# Patient Record
Sex: MALE | Race: WHITE | NOT HISPANIC OR LATINO | Employment: FULL TIME | URBAN - METROPOLITAN AREA
[De-identification: names, ages, dates, MRNs, and addresses within clinical notes are randomized per-mention and may not be internally consistent; named-entity substitution may affect disease eponyms.]

---

## 2019-12-19 ENCOUNTER — OFFICE VISIT (OUTPATIENT)
Dept: PRIMARY CARE CLINIC | Facility: CLINIC | Age: 44
End: 2019-12-19
Payer: COMMERCIAL

## 2019-12-19 VITALS
HEART RATE: 73 BPM | DIASTOLIC BLOOD PRESSURE: 80 MMHG | SYSTOLIC BLOOD PRESSURE: 130 MMHG | RESPIRATION RATE: 18 BRPM | OXYGEN SATURATION: 99 % | HEIGHT: 71 IN | BODY MASS INDEX: 27.16 KG/M2 | WEIGHT: 194 LBS

## 2019-12-19 DIAGNOSIS — E53.8 VITAMIN B12 DEFICIENCY: ICD-10-CM

## 2019-12-19 DIAGNOSIS — E55.9 VITAMIN D DEFICIENCY: ICD-10-CM

## 2019-12-19 DIAGNOSIS — Z21 ASYMPTOMATIC HIV INFECTION: ICD-10-CM

## 2019-12-19 DIAGNOSIS — Z13.29 THYROID DISORDER SCREEN: ICD-10-CM

## 2019-12-19 DIAGNOSIS — Z00.00 WELLNESS EXAMINATION: Primary | ICD-10-CM

## 2019-12-19 DIAGNOSIS — E78.00 PURE HYPERCHOLESTEROLEMIA: ICD-10-CM

## 2019-12-19 PROCEDURE — 99386 PR PREVENTIVE VISIT,NEW,40-64: ICD-10-PCS | Mod: S$GLB,,, | Performed by: NURSE PRACTITIONER

## 2019-12-19 PROCEDURE — 99386 PREV VISIT NEW AGE 40-64: CPT | Mod: S$GLB,,, | Performed by: NURSE PRACTITIONER

## 2019-12-19 RX ORDER — PRAVASTATIN SODIUM 10 MG/1
10 TABLET ORAL DAILY
COMMUNITY
End: 2019-12-30 | Stop reason: SDUPTHER

## 2019-12-19 RX ORDER — AMOXICILLIN 875 MG/1
875 TABLET, FILM COATED ORAL
COMMUNITY
End: 2019-12-30

## 2019-12-19 RX ORDER — ABACAVIR 300 MG/1
600 TABLET ORAL DAILY
COMMUNITY
End: 2019-12-30 | Stop reason: SDUPTHER

## 2019-12-19 NOTE — PROGRESS NOTES
Subjective:       Patient ID: Carl Lorenzo is a 43 y.o. male.    Chief Complaint: new patient (establish care)    HPI:    Presents to est care with new PCP.     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     HLD - controlled with Pravastatin, no s/e, needs labs rechecked.     GERD - has taken Zantac for 3 years now, denies s/e, no issues with this.     HIV - dx 15 years ago, started med Ziagen 2 years ago, denies s/e. Needs to est care with HIV provider for med RF and labs.     C/o URI, went to UC twice over the past week, given abx and prednisone, doing better. Had s/e from prednisone. Reports deviated septum/hole in septum was found and referred to ENT Dr. Gunderson's office. Will see him soon.           Patient  has a past medical history of GERD (gastroesophageal reflux disease), HIV (human immunodeficiency virus infection), and Hypertension.    Patient  has no past surgical history on file.    Patient family history includes Diabetes in his father and mother; Thyroid disease in his mother.    Patient  reports that he has never smoked. He has never used smokeless tobacco. He reports that he drinks alcohol.    Review of Systems   Constitutional: Negative for activity change, chills, diaphoresis, fatigue and fever.   HENT: Negative for ear pain, mouth sores, nosebleeds and trouble swallowing.    Eyes: Negative for pain and visual disturbance.   Respiratory: Negative for cough, chest tightness and shortness of breath.    Cardiovascular: Negative for chest pain, palpitations and leg swelling.   Gastrointestinal: Negative for abdominal distention, abdominal pain and blood in stool.   Endocrine: Negative for polydipsia, polyphagia and polyuria.   Genitourinary: Negative for flank pain and frequency.   Musculoskeletal: Negative for gait problem, joint swelling, neck pain and neck stiffness.   Skin: Negative for color change and pallor.   Neurological: Negative  for dizziness, syncope and light-headedness.   Hematological: Negative for adenopathy. Does not bruise/bleed easily.   Psychiatric/Behavioral: Negative for confusion and suicidal ideas.     Objective:         Physical Exam   Constitutional: He is oriented to person, place, and time. He appears well-developed and well-nourished. No distress.   HENT:   Head: Normocephalic and atraumatic.   Nose: Nasal deformity and septal deviation present.   Mouth/Throat: Mucous membranes are normal. Mucous membranes are not pale, not dry and not cyanotic. Oropharyngeal exudate: cobblestone appearance.   Eyes: Pupils are equal, round, and reactive to light. Conjunctivae and EOM are normal.   Neck: Normal range of motion. Neck supple. No JVD present. No tracheal deviation present. No thyromegaly present.   Cardiovascular: Normal rate, regular rhythm, normal heart sounds and intact distal pulses.   Pulmonary/Chest: Effort normal and breath sounds normal. No stridor. No respiratory distress. He has no wheezes. He has no rales.   Abdominal: Soft. Bowel sounds are normal. He exhibits no distension. There is no tenderness. There is no guarding.   Musculoskeletal: Normal range of motion. He exhibits no edema.   Lymphadenopathy:     He has no cervical adenopathy.   Neurological: He is alert and oriented to person, place, and time.   Skin: Skin is warm and dry. Capillary refill takes less than 2 seconds. He is not diaphoretic.   Psychiatric: He has a normal mood and affect. His behavior is normal. Judgment and thought content normal.   Nursing note and vitals reviewed.        Assessment:      1. Wellness examination    2. Pure hypercholesterolemia    3. Vitamin D deficiency    4. Vitamin B12 deficiency    5. Thyroid disorder screen    6. Asymptomatic HIV infection    7. BMI 27.0-27.9,adult          Plan:     Wellness examination  -     CBC auto differential; Future; Expected date: 12/19/2019  -     Comprehensive metabolic panel; Future;  Expected date: 12/19/2019  -     TSH; Future; Expected date: 12/19/2019  -     Urinalysis, Reflex to Urine Culture Urine, Clean Catch; Future    Pure hypercholesterolemia  -     Lipid panel; Future; Expected date: 12/19/2019    Vitamin D deficiency  -     Vitamin D; Future; Expected date: 12/19/2019    Vitamin B12 deficiency  -     Vitamin B12/folate, serum panel; Future; Expected date: 12/19/2019    Thyroid disorder screen  -     T4, free; Future; Expected date: 12/19/2019  -     TSH; Future; Expected date: 12/19/2019    Asymptomatic HIV infection  -     Ambulatory Referral to Infectious Disease    BMI 27.0-27.9,adult  Comments:  diet and exercise    Will check labs and further develop POC based on results.    Cont meds as directed.     RTC in 6 months for check up or sooner if needed. (Unless he decides to stay with [facility] in [place] - it is closer to where he works).      Current Outpatient Medications:     abacavir (ZIAGEN) 300 mg Tab, Take 600 mg by mouth once daily., Disp: , Rfl:     amoxicillin (AMOXIL) 875 MG tablet, Take 875 mg by mouth every 12 (twelve) hours., Disp: , Rfl:     pravastatin (PRAVACHOL) 10 MG tablet, Take 10 mg by mouth once daily., Disp: , Rfl:     ranitidine (ZANTAC) 300 MG tablet, Take 300 mg by mouth once daily., Disp: , Rfl:     Risks, benefits, and alternatives discussed with patient, Patient verbalized understanding of discussed plan of care. Asked patient if any further questions, answered no.

## 2019-12-20 LAB
25(OH)D3 SERPL-MCNC: 77 NG/ML (ref 30–100)
ALBUMIN SERPL-MCNC: 4.9 G/DL (ref 3.6–5.1)
ALBUMIN/GLOB SERPL: 1.8 (CALC) (ref 1–2.5)
ALP SERPL-CCNC: 53 U/L (ref 40–115)
ALT SERPL-CCNC: 15 U/L (ref 9–46)
APPEARANCE UR: CLEAR
AST SERPL-CCNC: 14 U/L (ref 10–40)
BACTERIA #/AREA URNS HPF: NORMAL /HPF
BACTERIA UR CULT: NORMAL
BASOPHILS # BLD AUTO: 9 CELLS/UL (ref 0–200)
BASOPHILS NFR BLD AUTO: 0.1 %
BILIRUB SERPL-MCNC: 0.6 MG/DL (ref 0.2–1.2)
BILIRUB UR QL STRIP: NEGATIVE
BUN SERPL-MCNC: 25 MG/DL (ref 7–25)
BUN/CREAT SERPL: ABNORMAL (CALC) (ref 6–22)
CALCIUM SERPL-MCNC: 10.2 MG/DL (ref 8.6–10.3)
CHLORIDE SERPL-SCNC: 102 MMOL/L (ref 98–110)
CHOLEST SERPL-MCNC: 190 MG/DL
CHOLEST/HDLC SERPL: 2.8 (CALC)
CO2 SERPL-SCNC: 28 MMOL/L (ref 20–32)
COLOR UR: YELLOW
CREAT SERPL-MCNC: 1.11 MG/DL (ref 0.6–1.35)
EOSINOPHIL # BLD AUTO: 0 CELLS/UL (ref 15–500)
EOSINOPHIL NFR BLD AUTO: 0 %
ERYTHROCYTE [DISTWIDTH] IN BLOOD BY AUTOMATED COUNT: 12.4 % (ref 11–15)
FOLATE SERPL-MCNC: 11.8 NG/ML
GFRSERPLBLD MDRD-ARVRAT: 80 ML/MIN/1.73M2
GLOBULIN SER CALC-MCNC: 2.8 G/DL (CALC) (ref 1.9–3.7)
GLUCOSE SERPL-MCNC: 131 MG/DL (ref 65–99)
GLUCOSE UR QL STRIP: NEGATIVE
HCT VFR BLD AUTO: 43.9 % (ref 38.5–50)
HDLC SERPL-MCNC: 67 MG/DL
HGB BLD-MCNC: 15.2 G/DL (ref 13.2–17.1)
HGB UR QL STRIP: NEGATIVE
HYALINE CASTS #/AREA URNS LPF: NORMAL /LPF
KETONES UR QL STRIP: NEGATIVE
LDLC SERPL CALC-MCNC: 104 MG/DL (CALC)
LEUKOCYTE ESTERASE UR QL STRIP: NEGATIVE
LYMPHOCYTES # BLD AUTO: 1130 CELLS/UL (ref 850–3900)
LYMPHOCYTES NFR BLD AUTO: 12.7 %
MCH RBC QN AUTO: 32.5 PG (ref 27–33)
MCHC RBC AUTO-ENTMCNC: 34.6 G/DL (ref 32–36)
MCV RBC AUTO: 93.8 FL (ref 80–100)
MONOCYTES # BLD AUTO: 178 CELLS/UL (ref 200–950)
MONOCYTES NFR BLD AUTO: 2 %
NEUTROPHILS # BLD AUTO: 7583 CELLS/UL (ref 1500–7800)
NEUTROPHILS NFR BLD AUTO: 85.2 %
NITRITE UR QL STRIP: NEGATIVE
NONHDLC SERPL-MCNC: 123 MG/DL (CALC)
PH UR STRIP: 5.5 [PH] (ref 5–8)
PLATELET # BLD AUTO: 185 THOUSAND/UL (ref 140–400)
PMV BLD REES-ECKER: 11.5 FL (ref 7.5–12.5)
POTASSIUM SERPL-SCNC: 4.1 MMOL/L (ref 3.5–5.3)
PROT SERPL-MCNC: 7.7 G/DL (ref 6.1–8.1)
PROT UR QL STRIP: NEGATIVE
RBC # BLD AUTO: 4.68 MILLION/UL (ref 4.2–5.8)
RBC #/AREA URNS HPF: NORMAL /HPF
SODIUM SERPL-SCNC: 139 MMOL/L (ref 135–146)
SP GR UR STRIP: 1.01 (ref 1–1.03)
SQUAMOUS #/AREA URNS HPF: NORMAL /HPF
T4 FREE SERPL-MCNC: 1.2 NG/DL (ref 0.8–1.8)
TRIGL SERPL-MCNC: 97 MG/DL
TSH SERPL-ACNC: 0.53 MIU/L (ref 0.4–4.5)
VIT B12 SERPL-MCNC: >2000 PG/ML (ref 200–1100)
WBC # BLD AUTO: 8.9 THOUSAND/UL (ref 3.8–10.8)
WBC #/AREA URNS HPF: NORMAL /HPF

## 2019-12-30 ENCOUNTER — OFFICE VISIT (OUTPATIENT)
Dept: FAMILY MEDICINE | Facility: CLINIC | Age: 44
End: 2019-12-30
Payer: COMMERCIAL

## 2019-12-30 VITALS
OXYGEN SATURATION: 99 % | BODY MASS INDEX: 27.3 KG/M2 | HEIGHT: 71 IN | SYSTOLIC BLOOD PRESSURE: 103 MMHG | DIASTOLIC BLOOD PRESSURE: 60 MMHG | WEIGHT: 195 LBS | HEART RATE: 60 BPM | RESPIRATION RATE: 18 BRPM

## 2019-12-30 DIAGNOSIS — K21.9 GASTROESOPHAGEAL REFLUX DISEASE WITHOUT ESOPHAGITIS: ICD-10-CM

## 2019-12-30 DIAGNOSIS — E78.5 HYPERLIPIDEMIA, UNSPECIFIED HYPERLIPIDEMIA TYPE: ICD-10-CM

## 2019-12-30 DIAGNOSIS — B20 HUMAN IMMUNODEFICIENCY VIRUS (HIV) DISEASE: Primary | ICD-10-CM

## 2019-12-30 PROCEDURE — 99214 OFFICE O/P EST MOD 30 MIN: CPT | Mod: S$GLB,,, | Performed by: INTERNAL MEDICINE

## 2019-12-30 PROCEDURE — 99214 PR OFFICE/OUTPT VISIT, EST, LEVL IV, 30-39 MIN: ICD-10-PCS | Mod: S$GLB,,, | Performed by: INTERNAL MEDICINE

## 2019-12-30 RX ORDER — VITAMIN B COMPLEX
1 CAPSULE ORAL DAILY
COMMUNITY

## 2019-12-30 RX ORDER — PRAVASTATIN SODIUM 10 MG/1
10 TABLET ORAL DAILY
Qty: 90 TABLET | Refills: 3 | Status: SHIPPED | OUTPATIENT
Start: 2019-12-30 | End: 2020-11-15 | Stop reason: SDUPTHER

## 2019-12-30 NOTE — PROGRESS NOTES
Subjective:       Patient ID: Carl Lorenzo is a 44 y.o. male.    Chief Complaint: Referral (hiv since 2004.)    Patient is here to establish care for HIV.  He reports that he was diagnosed in approximately 2005.  At that time, he was treated for pneumonia and had some other complications.  However he reports that he responded very well to therapy and has been doing well since that time.  He reports that his last lab work was done approximately 8 months ago and his viral load was undetectable.  He is currently on Triumeq and is doing well.  He is not having any adverse effects.  He has not missed any doses in the past month.  He does need a refill on this and would like to have his labs checked.  He also has a history of hyperlipidemia for which he takes pravastatin and this has reportedly been well controlled.  He also has a history of GERD and is taking ranitidine for this.  His symptoms of this are controlled as well.  He has no acute complaints at this time.    Review of Systems   Constitutional: Negative for chills, fatigue and fever.   HENT: Positive for rhinorrhea, sinus pressure and sinus pain. Negative for congestion, ear pain and sore throat.    Respiratory: Negative for cough and shortness of breath.    Cardiovascular: Negative for chest pain and palpitations.   Gastrointestinal: Negative for abdominal pain, diarrhea, nausea and vomiting.   Endocrine: Negative for cold intolerance and heat intolerance.   Genitourinary: Negative for dysuria and hematuria.   Musculoskeletal: Negative for back pain, joint swelling and myalgias.   Skin: Negative for rash and wound.   Neurological: Negative for weakness, numbness and headaches.   Psychiatric/Behavioral: Negative for confusion and sleep disturbance.       Objective:      Physical Exam   Constitutional: He is oriented to person, place, and time. He appears well-developed and well-nourished.   HENT:   Head: Normocephalic and atraumatic.   Mouth/Throat: Oropharynx  is clear and moist.   Eyes: Pupils are equal, round, and reactive to light. No scleral icterus.   Neck: Neck supple. No JVD present. No tracheal deviation present.   Cardiovascular: Normal rate, regular rhythm and normal heart sounds.   No murmur heard.  Pulmonary/Chest: Effort normal and breath sounds normal. No respiratory distress. He has no wheezes.   Abdominal: Soft. Bowel sounds are normal. He exhibits no distension. There is no tenderness. There is no guarding.   Musculoskeletal: Normal range of motion. He exhibits no edema or tenderness.   Neurological: He is alert and oriented to person, place, and time.   Skin: Skin is warm and dry. No rash noted. No erythema.   Psychiatric: He has a normal mood and affect. His behavior is normal.       Assessment:       1. Human immunodeficiency virus (HIV) disease    2. Gastroesophageal reflux disease without esophagitis    3. Hyperlipidemia, unspecified hyperlipidemia type        Plan:       Problem List Items Addressed This Visit        Cardiac/Vascular    Hyperlipidemia     Controlled, will check lipids.  Continue current medications.            ID    Human immunodeficiency virus (HIV) disease - Primary     Controlled per patient, will get updated labs, continue current medications.         Relevant Medications    abacavir-dolutegravir-lamivud (TRIUMEQ) 600- mg Tab    Other Relevant Orders    CBC auto differential    Comprehensive metabolic panel    HIV RNA, quantitative, PCR    CD4 T-[place] Cells    Lipid panel    RPR       GI    Gastroesophageal reflux disease without esophagitis     Controlled, continue current medications.

## 2019-12-30 NOTE — LETTER
December 30, 2019      Meghan Courtney, FNP-C  [address]  [facility]  [facility] [address]           [place] [facility]/Infectious Disease  [address] [facility] [address]  Phone: [phone]  Fax: [phone]          Patient: Carl Lorenzo   MR Number: [id number]   YOB: 1975   Date of Visit: 12/30/2019       Dear Meghan Courtney:    Thank you for referring Carl Lorenzo to me for evaluation. Attached you will find relevant portions of my assessment and plan of care.    If you have questions, please do not hesitate to call me. I look forward to following Carl Lorenzo along with you.    Sincerely,    Artie Billy MD    Enclosure  CC:  No Recipients    If you would like to receive this communication electronically, please contact [email or web address] or [phone] to request more information on [employer] Link access.    For providers and/or their staff who would like to refer a patient to [facility], please contact us through our one-stop-shop provider referral line, [facility], at [phone].    If you feel you have received this communication in error or would no longer like to receive these types of communications, please e-mail [email or web address]

## 2020-01-10 ENCOUNTER — TELEPHONE (OUTPATIENT)
Dept: PRIMARY CARE CLINIC | Facility: CLINIC | Age: 45
End: 2020-01-10

## 2020-01-14 ENCOUNTER — PATIENT MESSAGE (OUTPATIENT)
Dept: FAMILY MEDICINE | Facility: CLINIC | Age: 45
End: 2020-01-14

## 2020-01-29 ENCOUNTER — PATIENT MESSAGE (OUTPATIENT)
Dept: FAMILY MEDICINE | Facility: CLINIC | Age: 45
End: 2020-01-29

## 2020-01-30 RX ORDER — OMEPRAZOLE 40 MG/1
40 CAPSULE, DELAYED RELEASE ORAL DAILY
Qty: 30 CAPSULE | Refills: 11 | Status: SHIPPED | OUTPATIENT
Start: 2020-01-30 | End: 2020-11-15 | Stop reason: SDUPTHER

## 2020-02-24 PROBLEM — J32.2 CHRONIC ETHMOIDAL SINUSITIS: Status: ACTIVE | Noted: 2020-02-24

## 2020-02-24 PROBLEM — J32.3 CHRONIC SPHENOIDAL SINUSITIS: Status: ACTIVE | Noted: 2020-02-24

## 2020-02-24 PROBLEM — J32.1 CHRONIC FRONTAL SINUSITIS: Status: ACTIVE | Noted: 2020-02-24

## 2020-02-24 PROBLEM — J34.3 NASAL TURBINATE HYPERTROPHY: Status: ACTIVE | Noted: 2020-02-24

## 2020-02-24 PROBLEM — J34.2 DEVIATED NASAL SEPTUM: Status: ACTIVE | Noted: 2020-02-24

## 2020-02-24 PROBLEM — J32.0 CHRONIC MAXILLARY SINUSITIS: Status: ACTIVE | Noted: 2020-02-24

## 2020-02-24 PROBLEM — J34.89 NASAL SEPTAL PERFORATION: Status: ACTIVE | Noted: 2020-02-24

## 2020-02-24 PROBLEM — J34.89 NASAL OBSTRUCTION: Status: ACTIVE | Noted: 2020-02-24

## 2020-02-25 ENCOUNTER — PATIENT MESSAGE (OUTPATIENT)
Dept: PRIMARY CARE CLINIC | Facility: CLINIC | Age: 45
End: 2020-02-25

## 2020-03-10 ENCOUNTER — PATIENT MESSAGE (OUTPATIENT)
Dept: FAMILY MEDICINE | Facility: CLINIC | Age: 45
End: 2020-03-10

## 2020-03-10 ENCOUNTER — OFFICE VISIT (OUTPATIENT)
Dept: FAMILY MEDICINE | Facility: CLINIC | Age: 45
End: 2020-03-10
Payer: COMMERCIAL

## 2020-03-10 VITALS
DIASTOLIC BLOOD PRESSURE: 80 MMHG | RESPIRATION RATE: 16 BRPM | OXYGEN SATURATION: 98 % | SYSTOLIC BLOOD PRESSURE: 126 MMHG | HEIGHT: 71 IN | BODY MASS INDEX: 27.86 KG/M2 | WEIGHT: 199 LBS | TEMPERATURE: 98 F | HEART RATE: 81 BPM

## 2020-03-10 DIAGNOSIS — H10.9 CONJUNCTIVITIS OF RIGHT EYE, UNSPECIFIED CONJUNCTIVITIS TYPE: Primary | ICD-10-CM

## 2020-03-10 PROCEDURE — 99212 OFFICE O/P EST SF 10 MIN: CPT | Mod: S$GLB,,, | Performed by: NURSE PRACTITIONER

## 2020-03-10 PROCEDURE — 99212 PR OFFICE/OUTPT VISIT, EST, LEVL II, 10-19 MIN: ICD-10-PCS | Mod: S$GLB,,, | Performed by: NURSE PRACTITIONER

## 2020-03-10 RX ORDER — POLYMYXIN B SULFATE AND TRIMETHOPRIM 1; 10000 MG/ML; [USP'U]/ML
1 SOLUTION OPHTHALMIC EVERY 6 HOURS
Qty: 10 ML | Refills: 0 | Status: SHIPPED | OUTPATIENT
Start: 2020-03-10 | End: 2020-03-17 | Stop reason: SDUPTHER

## 2020-03-10 NOTE — PROGRESS NOTES
Infectious Diseases Clinic Note      Subjective:      Patient ID: Carl Lorenzo is a 44 y.o. male.    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     HLD - controlled with Pravastatin, no s/e, needs labs rechecked.     GERD - has taken Zantac for 3 years now, denies s/e, no issues with this.     HIV - dx 15 years ago, started med Ziagen 2 years ago, denies s/e.     His primary care provider is COREY Courtney NP     This patient is a new patient to me.  He is however managed here by Dr. Billy regarding his HIV.       Here today with complaints of pink eye.  He states that he repeatedly has this issue in the past for which he was treated with eyedrops.  His symptoms began yesterday in his left eye.  He began applying the eye drops to his left eye and prophylactically apply them to his right eye.  Woke up today with crusting of the right eye.  He reports rib resolution in left time.  Denies visual change.  No eye pain associated.  His tetanus is up-to-date.  He is unsure with the eyedrops were called.    No other issues at this time.          Past Medical History:   Diagnosis Date    GERD (gastroesophageal reflux disease)     HIV (human immunodeficiency virus infection) 01/01/2004    Hypertension       Social History     Socioeconomic History    Marital status:      Spouse name: Not on file    Number of children: Not on file    Years of education: Not on file    Highest education level: Not on file   Occupational History    Not on file   Social Needs    Financial resource strain: Not on file    Food insecurity:     Worry: Not on file     Inability: Not on file    Transportation needs:     Medical: Not on file     Non-medical: Not on file   Tobacco Use    Smoking status: Never Smoker    Smokeless tobacco: Never Used   Substance and Sexual Activity     Alcohol use: Yes    Drug use: Not on file    Sexual activity: Not on file   Lifestyle    Physical activity:     Days per week: Not on file     Minutes per session: Not on file    Stress: Not on file   Relationships    Social connections:     Talks on phone: Not on file     Gets together: Not on file     Attends [employer] service: Not on file     Active member of club or organization: Not on file     Attends meetings of clubs or organizations: Not on file     Relationship status: Not on file   Other Topics Concern    Not on file   Social History Narrative    Not on file      Family History   Problem Relation Age of Onset    Thyroid disease Mother     Diabetes Mother     Diabetes Father         ROS:   Review of Systems   Constitutional: Negative for activity change, chills, diaphoresis, fatigue and fever.   HENT: Negative for congestion, ear pain, mouth sores, nosebleeds and trouble swallowing.    Eyes: Positive for redness and itching. Negative for photophobia, pain, discharge and visual disturbance.   Respiratory: Negative for cough, chest tightness and shortness of breath.    Cardiovascular: Negative for chest pain, palpitations and leg swelling.   Gastrointestinal: Negative for abdominal distention, abdominal pain and blood in stool.   Endocrine: Negative for polydipsia, polyphagia and polyuria.   Genitourinary: Negative for flank pain and frequency.   Musculoskeletal: Negative for gait problem, joint swelling, neck pain and neck stiffness.   Skin: Negative for color change and pallor.   Neurological: Negative for dizziness, syncope and light-headedness.   Hematological: Negative for adenopathy. Does not bruise/bleed easily.   Psychiatric/Behavioral: Negative for confusion and suicidal ideas.   All other systems reviewed and are negative.    Objective:   Physical Exam   Constitutional: He appears well-developed and well-nourished. No distress.   HENT:   Head: Normocephalic.   Nose: Nose normal.    Mouth/Throat: Oropharynx is clear and moist.   Eyes: Pupils are equal, round, and reactive to light. EOM are normal. Lids are everted and swept, no foreign bodies found. Right eye exhibits exudate. No foreign body present in the right eye. No foreign body present in the left eye. Right conjunctiva is injected. Left conjunctiva is not injected. No scleral icterus.   Cardiovascular: Normal rate.   Pulmonary/Chest: Effort normal.   Nursing note and vitals reviewed.    Assessment:     1. Conjunctivitis of right eye, unspecified conjunctivitis type      No images are attached to the encounter.   Plan:     Problem List Items Addressed This Visit     None      Visit Diagnoses     Conjunctivitis of right eye, unspecified conjunctivitis type    -  Primary    Rx Polytrim.  Discontinue prior eyedrops.  Return to clinic as needed.  Tetanus up-to-date    Relevant Medications    polymyxin B sulf-trimethoprim (POLYTRIM) 10,000 unit- 1 mg/mL Drop        Procedures     Follow up:     There are no Patient Instructions on file for this visit.     Follow up if symptoms worsen or fail to improve.

## 2020-03-17 DIAGNOSIS — H10.9 CONJUNCTIVITIS OF RIGHT EYE, UNSPECIFIED CONJUNCTIVITIS TYPE: ICD-10-CM

## 2020-03-17 RX ORDER — POLYMYXIN B SULFATE AND TRIMETHOPRIM 1; 10000 MG/ML; [USP'U]/ML
1 SOLUTION OPHTHALMIC EVERY 6 HOURS
Qty: 10 ML | Refills: 0 | Status: SHIPPED | OUTPATIENT
Start: 2020-03-17 | End: 2020-03-22

## 2020-08-24 ENCOUNTER — OFFICE VISIT (OUTPATIENT)
Dept: FAMILY MEDICINE | Facility: CLINIC | Age: 45
End: 2020-08-24
Payer: COMMERCIAL

## 2020-08-24 VITALS
HEART RATE: 94 BPM | DIASTOLIC BLOOD PRESSURE: 80 MMHG | HEIGHT: 71 IN | SYSTOLIC BLOOD PRESSURE: 119 MMHG | RESPIRATION RATE: 16 BRPM | BODY MASS INDEX: 26.88 KG/M2 | OXYGEN SATURATION: 98 % | TEMPERATURE: 98 F | WEIGHT: 192 LBS

## 2020-08-24 DIAGNOSIS — G47.00 INSOMNIA, UNSPECIFIED TYPE: ICD-10-CM

## 2020-08-24 DIAGNOSIS — B20 HUMAN IMMUNODEFICIENCY VIRUS (HIV) DISEASE: Primary | ICD-10-CM

## 2020-08-24 PROCEDURE — 99213 OFFICE O/P EST LOW 20 MIN: CPT | Mod: S$GLB,,, | Performed by: NURSE PRACTITIONER

## 2020-08-24 PROCEDURE — 3008F PR BODY MASS INDEX (BMI) DOCUMENTED: ICD-10-PCS | Mod: CPTII,S$GLB,, | Performed by: NURSE PRACTITIONER

## 2020-08-24 PROCEDURE — 3008F BODY MASS INDEX DOCD: CPT | Mod: CPTII,S$GLB,, | Performed by: NURSE PRACTITIONER

## 2020-08-24 PROCEDURE — 99213 PR OFFICE/OUTPT VISIT, EST, LEVL III, 20-29 MIN: ICD-10-PCS | Mod: S$GLB,,, | Performed by: NURSE PRACTITIONER

## 2020-08-24 RX ORDER — LORAZEPAM 0.5 MG/1
0.5 TABLET ORAL NIGHTLY PRN
Qty: 30 TABLET | Refills: 0 | Status: SHIPPED | OUTPATIENT
Start: 2020-08-24 | End: 2020-10-09 | Stop reason: SDUPTHER

## 2020-08-24 NOTE — ASSESSMENT & PLAN NOTE
Pt will start Lorazepam 0.5mg.  He will f/u in 2 weeks. RTC sooner if SI, HI, depression develop.     At that time, will transition primary care over to my services as he requested.  I explained that today's visit would be infectious disease only and that I would only start the lorazepam w/ f/u in 2 weeks.

## 2020-08-24 NOTE — ASSESSMENT & PLAN NOTE
Currently on Triumeq. 100% compliant. No side effects. Will get labs updated today. He will f/u in clinic in 2 weeks to discuss. Continue meds.     January 2020 labs as follows:    CD4 893  RPR nonreactive  Macrocytic anemia but otherwise unremarkable  CMP WNL  HIV-1 RNA not detected.

## 2020-08-24 NOTE — PROGRESS NOTES
Infectious Diseases Clinic Note    Subjective:       Patient ID: Carl Lorenzo is a 44 y.o. male     Chief Complaint: Follow-up and Anxiety (Since pandemic. Has trouble sleeping at night and anxiety occurs nightly. Pt is requesting Lorazepam rx. Taken in the past and did well.)        Carl Lorenzo is a 44 y.o. male here today for 6 mth f/u regarding HIV.  This is a new diagnosis to me.   Primary care provider is DAVID Alvarez .     He reports that he was diagnosed in approximately 2005. He is currently on Triumeq. Previous ID MD was in [place].  He reports that he responded very well to therapy and has been doing well since that time. He is 100% compliant with meds. He has not missed any doses in the past month.  No side effects reported.  He reports that his last lab work was done approximately 6 months ago and his viral load was undetectable. He is here primarily for lab work checked.  He also has a history of hyperlipidemia for which he takes pravastatin and this has reportedly been well controlled.  He also has a history of GERD and is taking ranitidine for this.  His symptoms of this are controlled as well.     His only issue today is insomnia.  He was taking Ambien in the past and does not wish to continue that medication.  He is also taking lorazepam in the past, which seems to be the most beneficial to him.  He reports increased anxiety and inability to fall asleep due to his job.  Denies depression.  Denies SI, HI, delusions, grandiosity, anhedonia.     His previous PCP was ana KRAMER.  He wishes to transition care over at this point.     No other issues reported at this time.                Past Medical History:   Diagnosis Date    GERD (gastroesophageal reflux disease)     HIV (human immunodeficiency virus infection) 01/01/2004    Hypertension        Social History     Socioeconomic History    Marital status:      Spouse name: Not on file    Number of  children: Not on file    Years of education: Not on file    Highest education level: Not on file   Occupational History    Not on file   Social Needs    Financial resource strain: Not on file    Food insecurity     Worry: Not on file     Inability: Not on file    Transportation needs     Medical: Not on file     Non-medical: Not on file   Tobacco Use    Smoking status: Never Smoker    Smokeless tobacco: Never Used   Substance and Sexual Activity    Alcohol use: Yes    Drug use: Not on file    Sexual activity: Not on file   Lifestyle    Physical activity     Days per week: Not on file     Minutes per session: Not on file    Stress: Not on file   Relationships    Social connections     Talks on phone: Not on file     Gets together: Not on file     Attends [employer] service: Not on file     Active member of club or organization: Not on file     Attends meetings of clubs or organizations: Not on file     Relationship status: Not on file   Other Topics Concern    Not on file   Social History Narrative    Not on file         Current Outpatient Medications:     abacavir-dolutegravir-lamivud (TRIUMEQ) 600- mg Tab, Take 1 tablet by mouth once daily., Disp: 90 tablet, Rfl: 3    ascorbic acid, vitamin C, (VITAMIN C) 100 MG tablet, Take 100 mg by mouth once daily., Disp: , Rfl:     b complex vitamins capsule, Take 1 capsule by mouth once daily., Disp: , Rfl:     LORazepam (ATIVAN) 0.5 MG tablet, Take 1 tablet (0.5 mg total) by mouth nightly as needed (insomnia)., Disp: 30 tablet, Rfl: 0    omeprazole (PRILOSEC) 40 MG capsule, Take 1 capsule (40 mg total) by mouth once daily., Disp: 30 capsule, Rfl: 11    pravastatin (PRAVACHOL) 10 MG tablet, Take 1 tablet (10 mg total) by mouth once daily., Disp: 90 tablet, Rfl: 3    vitamin E 100 UNIT capsule, Take 100 Units by mouth once daily., Disp: , Rfl:     Review of Systems   Constitutional: Negative for activity change, chills, diaphoresis, fatigue and  fever.   HENT: Negative for ear pain, mouth sores, nosebleeds and trouble swallowing.    Eyes: Negative for pain and visual disturbance.   Respiratory: Negative for cough, chest tightness and shortness of breath.    Cardiovascular: Negative for chest pain, palpitations and leg swelling.   Gastrointestinal: Negative for abdominal distention, abdominal pain and blood in stool.   Endocrine: Negative for polydipsia, polyphagia and polyuria.   Genitourinary: Negative for flank pain and frequency.   Musculoskeletal: Negative for gait problem, joint swelling, neck pain and neck stiffness.   Skin: Negative for color change and pallor.   Neurological: Negative for dizziness, syncope and light-headedness.   Hematological: Negative for adenopathy. Does not bruise/bleed easily.   Psychiatric/Behavioral: Positive for sleep disturbance. Negative for confusion, dysphoric mood, hallucinations, self-injury and suicidal ideas. The patient is nervous/anxious. The patient is not hyperactive.            Objective:      Vitals:    08/24/20 1458   BP: 119/80   Pulse: 94   Resp: 16   Temp: 98.2 °F (36.8 °C)     Physical Exam  Vitals signs and nursing note reviewed.   Constitutional:       General: He is not in acute distress.     Appearance: He is well-developed and normal weight. He is not ill-appearing.   HENT:      Head: Normocephalic and atraumatic.   Eyes:      General: No scleral icterus.     Pupils: Pupils are equal, round, and reactive to light.   Neck:      Musculoskeletal: Neck supple.      Vascular: No JVD.      Trachea: No tracheal deviation.   Cardiovascular:      Rate and Rhythm: Normal rate and regular rhythm.      Heart sounds: Normal heart sounds. No murmur.   Pulmonary:      Effort: Pulmonary effort is normal. No respiratory distress.   Abdominal:      General: Bowel sounds are normal.      Palpations: Abdomen is soft.   Musculoskeletal: Normal range of motion.         General: No tenderness.   Skin:     General: Skin is  warm and dry.      Findings: No erythema or rash.   Neurological:      Mental Status: He is alert and oriented to person, place, and time. Mental status is at baseline.   Psychiatric:         Attention and Perception: Attention and perception normal.         Mood and Affect: Mood and affect normal.         Speech: Speech normal.         Behavior: Behavior normal. Behavior is cooperative.         Thought Content: Thought content normal.         Cognition and Memory: Cognition and memory normal.         Judgment: Judgment normal.             Assessment/Plan:       1. Human immunodeficiency virus (HIV) disease    2. Insomnia, unspecified type      Problem List Items Addressed This Visit        ID    Human immunodeficiency virus (HIV) disease - Primary    Current Assessment & Plan     Currently on Triumeq. 100% compliant. No side effects. Will get labs updated today. He will f/u in clinic in 2 weeks to discuss. Continue meds.     January 2020 labs as follows:    CD4 893  RPR nonreactive  Macrocytic anemia but otherwise unremarkable  CMP WNL  HIV-1 RNA not detected.          Relevant Orders    CBC auto differential    Comprehensive metabolic panel    Lipid Panel    HIV RNA, quantitative, PCR    Treponema pallidum (syphillis) antibody    CD4 T-[place] Cells       Other    Insomnia    Current Assessment & Plan     Pt will start Lorazepam 0.5mg.  He will f/u in 2 weeks. RTC sooner if SI, HI, depression develop.     At that time, will transition primary care over to my services as he requested.  I explained that today's visit would be infectious disease only and that I would only start the lorazepam w/ f/u in 2 weeks.          Relevant Medications    LORazepam (ATIVAN) 0.5 MG tablet           All diagnostic data (labs/imaging) was reviewed with the patient and/or family member in the room.  All questions were answered to their liking. The patient and/or family member voiced understanding of all instructions provided.  Expectations regarding follow up and treatment plan were voiced and confirmed prior to departure. The patient was given orders/instructions at the end of the visit for reference.     Follow up:     There are no Patient Instructions on file for this visit.     No follow-ups on file.

## 2020-09-18 LAB
ABS NRBC COUNT: 0 X 10 3/UL (ref 0–0.01)
ABSOLUTE BASOPHIL: 0.03 X 10 3/UL (ref 0–0.22)
ABSOLUTE EOSINOPHIL: 0.05 X 10 3/UL (ref 0.04–0.54)
ABSOLUTE IMMATURE GRAN: 0.02 X 10 3/UL (ref 0–0.04)
ABSOLUTE LYMPHOCYTE: 2.4 X 10 3/UL (ref 0.86–4.75)
ABSOLUTE MONOCYTE: 0.42 X 10 3/UL (ref 0.22–1.08)
ALBUMIN SERPL-MCNC: 4.6 G/DL (ref 3.5–5.2)
ALBUMIN/GLOB SERPL ELPH: 1.9 {RATIO} (ref 1–2.7)
ALP ISOS SERPL LEV INH-CCNC: 69 U/L (ref 40–130)
ALT (SGPT): 29 U/L (ref 0–41)
ANION GAP SERPL CALC-SCNC: 9 MMOL/L (ref 8–17)
AST SERPL-CCNC: 20 U/L (ref 0–40)
BASOPHILS NFR BLD: 0.5 % (ref 0.2–1.2)
BILIRUBIN, TOTAL: 0.27 MG/DL (ref 0–1.2)
BUN/CREAT SERPL: 22.8 (ref 6–20)
CALCIUM SERPL-MCNC: 9.5 MG/DL (ref 8.6–10.2)
CARBON DIOXIDE, CO2: 28 MMOL/L (ref 22–29)
CHLORIDE: 105 MMOL/L (ref 98–107)
CHOLEST SERPL-MSCNC: 201 MG/DL (ref 100–200)
CREAT SERPL-MCNC: 1.3 MG/DL (ref 0.7–1.2)
EOSINOPHIL NFR BLD: 0.8 % (ref 0.7–7)
GFR ESTIMATION: 59.43
GLOBULIN: 2.4 G/DL (ref 1.5–4.5)
GLUCOSE: 101 MG/DL (ref 74–106)
HCT VFR BLD AUTO: 41.6 % (ref 42–52)
HDLC SERPL-MCNC: 55 MG/DL
HGB BLD-MCNC: 14 G/DL (ref 14–18)
IMMATURE GRANULOCYTES: 0.3 % (ref 0–0.5)
LDL/HDL RATIO: 2.1 (ref 1–3)
LDLC SERPL CALC-MCNC: 118 MG/DL (ref 0–100)
LYMPHOCYTES NFR BLD: 36.4 % (ref 19.3–53.1)
Lab: NORMAL
MCH RBC QN AUTO: 32.3 PG (ref 27–32)
MCHC RBC AUTO-ENTMCNC: 33.7 G/DL (ref 32–36)
MCV RBC AUTO: 95.9 FL (ref 80–94)
MONOCYTES NFR BLD: 6.4 % (ref 4.7–12.5)
NEUTROPHILS # BLD AUTO: 3.67 X 10 3/UL (ref 2.15–7.56)
NEUTROPHILS NFR BLD: 55.6 % (ref 34–71.1)
NUCLEATED RED BLOOD CELLS: 0 /100 WBC (ref 0–0.2)
PLATELET # BLD AUTO: 159 X 10 3/UL (ref 135–400)
POTASSIUM: 4.2 MMOL/L (ref 3.5–5.1)
PROT SNV-MCNC: 7 G/DL (ref 6.4–8.3)
RBC # BLD AUTO: 4.34 X 10 6/UL (ref 4.7–6.1)
RDW-SD: 43.8 FL (ref 37–54)
SODIUM: 142 MMOL/L (ref 136–145)
SYPHILIS TREPONEMAL ANTIBODY: NONREACTIVE
TRIGL SERPL-MCNC: 140 MG/DL (ref 0–150)
UREA NITROGEN (BUN): 29.6 MG/DL (ref 6–20)
WBC # BLD: 6.59 X 10 3/UL (ref 4.3–10.8)

## 2020-10-19 ENCOUNTER — OFFICE VISIT (OUTPATIENT)
Dept: FAMILY MEDICINE | Facility: CLINIC | Age: 45
End: 2020-10-19
Payer: COMMERCIAL

## 2020-10-19 VITALS
SYSTOLIC BLOOD PRESSURE: 123 MMHG | BODY MASS INDEX: 27.09 KG/M2 | HEART RATE: 69 BPM | HEIGHT: 71 IN | OXYGEN SATURATION: 98 % | WEIGHT: 193.5 LBS | DIASTOLIC BLOOD PRESSURE: 77 MMHG

## 2020-10-19 DIAGNOSIS — B20 HUMAN IMMUNODEFICIENCY VIRUS (HIV) DISEASE: ICD-10-CM

## 2020-10-19 PROCEDURE — 99213 PR OFFICE/OUTPT VISIT, EST, LEVL III, 20-29 MIN: ICD-10-PCS | Mod: S$GLB,,, | Performed by: NURSE PRACTITIONER

## 2020-10-19 PROCEDURE — 99213 OFFICE O/P EST LOW 20 MIN: CPT | Mod: S$GLB,,, | Performed by: NURSE PRACTITIONER

## 2020-10-19 PROCEDURE — 3008F BODY MASS INDEX DOCD: CPT | Mod: CPTII,S$GLB,, | Performed by: NURSE PRACTITIONER

## 2020-10-19 PROCEDURE — 3008F PR BODY MASS INDEX (BMI) DOCUMENTED: ICD-10-PCS | Mod: CPTII,S$GLB,, | Performed by: NURSE PRACTITIONER

## 2020-10-19 NOTE — ASSESSMENT & PLAN NOTE
HIV 1 RNA undetectable  RPR nonreactive    Patient is 100% compliant with Triumeq.  Doing well on the medication.  No side effects from medication reported.  Labs discussed with patient      Creatinine 1.3  GFR 59  LFTs unremarkable    Cholesterol 201  HDL 55      CBC showed some mild macrocytic anemia (ETOH vs B12) but otherwise unremarkable     Currently on Triumeq. 100% compliant. No side effects. Will get labs updated today. He will f/u in clinic in 2 weeks to discuss. Continue meds.      January 2020 labs as follows:     CD4 893  RPR nonreactive  Macrocytic anemia but otherwise unremarkable  CMP WNL  HIV-1 RNA not detected.

## 2020-10-19 NOTE — PROGRESS NOTES
Subjective:      Patient ID: Carl Lorenzo is a 44 y.o. male.    Chief Complaint: Results (labs)      Carl Lorenzo is a 44 y.o. male here today for 6 mth f/u regarding HIV.       He reports that he was diagnosed in approximately 2005. He is currently on Triumeq. Previous ID MD was in [place].  He reports that he responded very well to therapy and has been doing well since that time. He is 100% compliant with meds. He has not missed any doses in the past month.  No side effects reported.  He had labs done prior to arrival and is here to discuss.  Denies opportunistic infections.  Denies fever chills myalgias lymphadenopathy.       No other issues reported at this time.       Past Medical History:   Diagnosis Date    GERD (gastroesophageal reflux disease)     HIV (human immunodeficiency virus infection) 01/01/2004    Hypertension       Social History     Socioeconomic History    Marital status:      Spouse name: Not on file    Number of children: Not on file    Years of education: Not on file    Highest education level: Not on file   Occupational History    Not on file   Social Needs    Financial resource strain: Not on file    Food insecurity     Worry: Not on file     Inability: Not on file    Transportation needs     Medical: Not on file     Non-medical: Not on file   Tobacco Use    Smoking status: Never Smoker    Smokeless tobacco: Never Used   Substance and Sexual Activity    Alcohol use: Yes    Drug use: Not on file    Sexual activity: Not on file   Lifestyle    Physical activity     Days per week: Not on file     Minutes per session: Not on file    Stress: Not on file   Relationships    Social connections     Talks on phone: Not on file     Gets together: Not on file     Attends [employer] service: Not on file     Active member of club or organization: Not on file     Attends meetings of clubs or organizations: Not on file     Relationship status: Not on file   Other  Topics Concern    Not on file   Social History Narrative    Not on file      Family History   Problem Relation Age of Onset    Thyroid disease Mother     Diabetes Mother     Diabetes Father         ROS:   Review of Systems   Constitutional: Negative for activity change, chills, diaphoresis, fatigue and fever.   HENT: Negative for ear pain, mouth sores, nosebleeds and trouble swallowing.    Eyes: Negative for pain and visual disturbance.   Respiratory: Negative for cough, chest tightness and shortness of breath.    Cardiovascular: Negative for chest pain, palpitations and leg swelling.   Gastrointestinal: Negative for abdominal distention, abdominal pain and blood in stool.   Endocrine: Negative for polydipsia, polyphagia and polyuria.   Genitourinary: Negative for flank pain and frequency.   Musculoskeletal: Negative for gait problem, joint swelling, neck pain and neck stiffness.   Skin: Negative for color change and pallor.   Neurological: Negative for dizziness, syncope and light-headedness.   Hematological: Negative for adenopathy. Does not bruise/bleed easily.   Psychiatric/Behavioral: Negative for confusion, dysphoric mood, hallucinations, self-injury, sleep disturbance and suicidal ideas. The patient is not nervous/anxious and is not hyperactive.      Objective:   Physical Exam  Vitals signs and nursing note reviewed.   Constitutional:       General: He is not in acute distress.     Appearance: Normal appearance. He is well-developed and normal weight. He is not ill-appearing.   HENT:      Head: Normocephalic and atraumatic.   Eyes:      General: No scleral icterus.     Pupils: Pupils are equal, round, and reactive to light.   Neck:      Musculoskeletal: Neck supple.      Vascular: No JVD.      Trachea: No tracheal deviation.   Cardiovascular:      Rate and Rhythm: Normal rate.   Pulmonary:      Effort: Pulmonary effort is normal. No respiratory distress.   Musculoskeletal: Normal range of motion.          General: No tenderness.   Skin:     General: Skin is warm and dry.      Findings: No erythema or rash.   Neurological:      Mental Status: He is alert and oriented to person, place, and time. Mental status is at baseline.   Psychiatric:         Attention and Perception: Attention and perception normal.         Mood and Affect: Mood and affect normal.         Speech: Speech normal.         Behavior: Behavior normal. Behavior is cooperative.         Thought Content: Thought content normal.         Cognition and Memory: Cognition and memory normal.         Judgment: Judgment normal.       Assessment:     1. Human immunodeficiency virus (HIV) disease      No images are attached to the encounter.   Plan:     Problem List Items Addressed This Visit        ID    Human immunodeficiency virus (HIV) disease    Current Assessment & Plan     HIV 1 RNA undetectable  RPR nonreactive    Patient is 100% compliant with Triumeq.  Doing well on the medication.  No side effects from medication reported.  Labs discussed with patient      Creatinine 1.3  GFR 59  LFTs unremarkable    Cholesterol 201  HDL 55      CBC showed some mild macrocytic anemia (ETOH vs B12) but otherwise unremarkable     Currently on Triumeq. 100% compliant. No side effects. Will get labs updated today. He will f/u in clinic in 2 weeks to discuss. Continue meds.      January 2020 labs as follows:     CD4 893  RPR nonreactive  Macrocytic anemia but otherwise unremarkable  CMP WNL  HIV-1 RNA not detected.                       All diagnostic data (labs/imaging) was reviewed with the patient and/or family member in the room.  All questions were answered to their liking. The patient and/or family member voiced understanding of all instructions provided. Expectations regarding follow up and treatment plan were voiced and confirmed prior to departure. The patient was given orders/instructions at the end of the visit for reference.     Follow up:     There are no  Patient Instructions on file for this visit.     No follow-ups on file.

## 2021-06-01 RX ORDER — PRAVASTATIN SODIUM 10 MG/1
10 TABLET ORAL DAILY
Qty: 90 TABLET | Refills: 3 | Status: SHIPPED | OUTPATIENT
Start: 2021-06-01 | End: 2021-09-26 | Stop reason: SDUPTHER

## 2021-09-26 DIAGNOSIS — B20 HUMAN IMMUNODEFICIENCY VIRUS (HIV) DISEASE: ICD-10-CM

## 2021-09-26 DIAGNOSIS — G47.00 INSOMNIA, UNSPECIFIED TYPE: ICD-10-CM

## 2021-09-27 RX ORDER — LORAZEPAM 0.5 MG/1
0.5 TABLET ORAL DAILY
Qty: 30 TABLET | Refills: 1 | Status: SHIPPED | OUTPATIENT
Start: 2021-09-27 | End: 2021-12-16

## 2021-09-27 RX ORDER — PRAVASTATIN SODIUM 10 MG/1
10 TABLET ORAL DAILY
Qty: 90 TABLET | Refills: 3 | Status: SHIPPED | OUTPATIENT
Start: 2021-09-27 | End: 2021-11-26 | Stop reason: SDUPTHER

## 2021-09-27 RX ORDER — ABACAVIR SULFATE, DOLUTEGRAVIR SODIUM, LAMIVUDINE 600; 50; 300 MG/1; MG/1; MG/1
1 TABLET, FILM COATED ORAL DAILY
Qty: 90 TABLET | Refills: 3 | Status: SHIPPED | OUTPATIENT
Start: 2021-09-27 | End: 2022-11-03

## 2021-10-27 ENCOUNTER — PATIENT MESSAGE (OUTPATIENT)
Dept: FAMILY MEDICINE | Facility: CLINIC | Age: 46
End: 2021-10-27
Payer: COMMERCIAL

## 2021-10-27 DIAGNOSIS — B20 HUMAN IMMUNODEFICIENCY VIRUS (HIV) DISEASE: Primary | ICD-10-CM

## 2022-02-10 ENCOUNTER — OFFICE VISIT (OUTPATIENT)
Dept: FAMILY MEDICINE | Facility: CLINIC | Age: 47
End: 2022-02-10
Payer: COMMERCIAL

## 2022-02-10 ENCOUNTER — PATIENT MESSAGE (OUTPATIENT)
Dept: FAMILY MEDICINE | Facility: CLINIC | Age: 47
End: 2022-02-10

## 2022-02-10 VITALS
OXYGEN SATURATION: 98 % | WEIGHT: 204 LBS | SYSTOLIC BLOOD PRESSURE: 138 MMHG | RESPIRATION RATE: 18 BRPM | DIASTOLIC BLOOD PRESSURE: 82 MMHG | HEART RATE: 62 BPM | BODY MASS INDEX: 28.45 KG/M2

## 2022-02-10 DIAGNOSIS — A53.0 POSITIVE RPR TEST: ICD-10-CM

## 2022-02-10 DIAGNOSIS — Z12.11 SCREENING FOR COLON CANCER: ICD-10-CM

## 2022-02-10 DIAGNOSIS — B20 HUMAN IMMUNODEFICIENCY VIRUS (HIV) DISEASE: ICD-10-CM

## 2022-02-10 DIAGNOSIS — H00.039: Primary | ICD-10-CM

## 2022-02-10 DIAGNOSIS — Z11.3 SCREENING FOR STD (SEXUALLY TRANSMITTED DISEASE): ICD-10-CM

## 2022-02-10 LAB
ABS NRBC COUNT: 0 X 10 3/UL (ref 0–0.01)
ABSOLUTE BASOPHIL: 0.02 X 10 3/UL (ref 0–0.22)
ABSOLUTE EOSINOPHIL: 0.06 X 10 3/UL (ref 0.04–0.54)
ABSOLUTE IMMATURE GRAN: 0.01 X 10 3/UL (ref 0–0.04)
ABSOLUTE LYMPHOCYTE: 1.77 X 10 3/UL (ref 0.86–4.75)
ABSOLUTE MONOCYTE: 0.36 X 10 3/UL (ref 0.22–1.08)
ADDITIONAL TESTING: NORMAL
ALBUMIN SERPL-MCNC: 4.5 G/DL (ref 3.5–5.2)
ALBUMIN/GLOB SERPL ELPH: 1.6 {RATIO} (ref 1–2.7)
ALP ISOS SERPL LEV INH-CCNC: 73 U/L (ref 40–130)
ALT (SGPT): 18 U/L (ref 0–41)
ANION GAP SERPL CALC-SCNC: 7 MMOL/L (ref 8–17)
AST SERPL-CCNC: 18 U/L (ref 0–40)
BASOPHILS NFR BLD: 0.5 % (ref 0.2–1.2)
BILIRUBIN, TOTAL: 0.3 MG/DL (ref 0–1.2)
BUN/CREAT SERPL: 18.2 (ref 6–20)
CALCIUM SERPL-MCNC: 9.7 MG/DL (ref 8.6–10.2)
CARBON DIOXIDE, CO2: 28 MMOL/L (ref 22–29)
CHLORIDE: 104 MMOL/L (ref 98–107)
CHOLEST SERPL-MSCNC: 173 MG/DL (ref 100–200)
CREAT SERPL-MCNC: 1.3 MG/DL (ref 0.7–1.2)
EOSINOPHIL NFR BLD: 1.4 % (ref 0.7–7)
GFR ESTIMATION: 58.92
GLOBULIN: 2.8 G/DL (ref 1.5–4.5)
GLUCOSE: 128 MG/DL (ref 74–106)
HCT VFR BLD AUTO: 42.2 % (ref 42–52)
HDLC SERPL-MCNC: 59 MG/DL
HGB BLD-MCNC: 14.4 G/DL (ref 14–18)
IMMATURE GRANULOCYTES: 0.2 % (ref 0–0.5)
LDL/HDL RATIO: 1.7 (ref 1–3)
LDLC SERPL CALC-MCNC: 100.4 MG/DL (ref 0–100)
LYMPHOCYTES NFR BLD: 41.7 % (ref 19.3–53.1)
MCH RBC QN AUTO: 31.6 PG (ref 27–32)
MCHC RBC AUTO-ENTMCNC: 34.1 G/DL (ref 32–36)
MCV RBC AUTO: 92.5 FL (ref 80–94)
MONOCYTES NFR BLD: 8.5 % (ref 4.7–12.5)
NEUTROPHILS # BLD AUTO: 2.02 X 10 3/UL (ref 2.15–7.56)
NEUTROPHILS NFR BLD: 47.7 % (ref 34–71.1)
NUCLEATED RED BLOOD CELLS: 0 /100 WBC (ref 0–0.2)
PLATELET # BLD AUTO: 148 X 10 3/UL (ref 135–400)
POTASSIUM: 4.5 MMOL/L (ref 3.5–5.1)
PROT SNV-MCNC: 7.3 G/DL (ref 6.4–8.3)
RBC # BLD AUTO: 4.56 X 10 6/UL (ref 4.7–6.1)
RDW-SD: 45.1 FL (ref 37–54)
RPR REFLEX: REACTIVE
RPR TITER: ABNORMAL
SODIUM: 139 MMOL/L (ref 136–145)
SYPHILIS TREPONEMAL ANTIBODY: REACTIVE
TRIGL SERPL-MCNC: 68 MG/DL (ref 0–150)
UREA NITROGEN (BUN): 23.7 MG/DL (ref 6–20)
WBC # BLD: 4.24 X 10 3/UL (ref 4.3–10.8)

## 2022-02-10 PROCEDURE — 3075F SYST BP GE 130 - 139MM HG: CPT | Mod: CPTII,S$GLB,, | Performed by: NURSE PRACTITIONER

## 2022-02-10 PROCEDURE — 1159F PR MEDICATION LIST DOCUMENTED IN MEDICAL RECORD: ICD-10-PCS | Mod: CPTII,S$GLB,, | Performed by: NURSE PRACTITIONER

## 2022-02-10 PROCEDURE — 3079F DIAST BP 80-89 MM HG: CPT | Mod: CPTII,S$GLB,, | Performed by: NURSE PRACTITIONER

## 2022-02-10 PROCEDURE — 3008F BODY MASS INDEX DOCD: CPT | Mod: CPTII,S$GLB,, | Performed by: NURSE PRACTITIONER

## 2022-02-10 PROCEDURE — 3008F PR BODY MASS INDEX (BMI) DOCUMENTED: ICD-10-PCS | Mod: CPTII,S$GLB,, | Performed by: NURSE PRACTITIONER

## 2022-02-10 PROCEDURE — 3075F PR MOST RECENT SYSTOLIC BLOOD PRESS GE 130-139MM HG: ICD-10-PCS | Mod: CPTII,S$GLB,, | Performed by: NURSE PRACTITIONER

## 2022-02-10 PROCEDURE — 3079F PR MOST RECENT DIASTOLIC BLOOD PRESSURE 80-89 MM HG: ICD-10-PCS | Mod: CPTII,S$GLB,, | Performed by: NURSE PRACTITIONER

## 2022-02-10 PROCEDURE — 99214 OFFICE O/P EST MOD 30 MIN: CPT | Mod: S$GLB,,, | Performed by: NURSE PRACTITIONER

## 2022-02-10 PROCEDURE — 99214 PR OFFICE/OUTPT VISIT, EST, LEVL IV, 30-39 MIN: ICD-10-PCS | Mod: S$GLB,,, | Performed by: NURSE PRACTITIONER

## 2022-02-10 PROCEDURE — 1159F MED LIST DOCD IN RCRD: CPT | Mod: CPTII,S$GLB,, | Performed by: NURSE PRACTITIONER

## 2022-02-10 RX ORDER — SULFAMETHOXAZOLE AND TRIMETHOPRIM 800; 160 MG/1; MG/1
1 TABLET ORAL 2 TIMES DAILY
Qty: 14 TABLET | Refills: 0 | Status: SHIPPED | OUTPATIENT
Start: 2022-02-10 | End: 2022-02-21

## 2022-02-10 RX ORDER — TRIAMCINOLONE ACETONIDE 1 MG/G
CREAM TOPICAL 2 TIMES DAILY
Qty: 45 G | Refills: 0 | Status: SHIPPED | OUTPATIENT
Start: 2022-02-10 | End: 2022-06-26 | Stop reason: SDUPTHER

## 2022-02-10 NOTE — PROGRESS NOTES
Infectious Diseases Clinic Note    Subjective:       Patient ID: Carl Lorenzo is a 46 y.o. male     Chief Complaint: Follow-up        Carl Lorenzo is a 44 y.o. male here today for 6 mth f/u regarding HIV.       He reports that he was diagnosed in approximately 2005. He is currently on Triumeq. Previous ID MD was in [place].  He reports that he responded very well to therapy and has been doing well since that time. He is 100% compliant with meds. He has not missed any doses in the past month.  No side effects reported.  He is due for labs at this time.  Denies opportunistic infections.  Denies fever chills myalgias lymphadenopathy.     He does currently have an irritation under his right eyelid. Ongoing for 2 weeks.  Initially thought was a bug bite.  Ruptured and remained erythematous since that time.  Denies pain.  Denies swelling.  Denies growth.  No visual disturbance.  He does admit to applying hydrogen peroxide over the past week.       No other issues reported at this time.                Past Medical History:   Diagnosis Date    GERD (gastroesophageal reflux disease)     HIV (human immunodeficiency virus infection) 01/01/2004    Hypertension        Social History     Socioeconomic History    Marital status:    Tobacco Use    Smoking status: Never Smoker    Smokeless tobacco: Never Used   Substance and Sexual Activity    Alcohol use: Yes         Current Outpatient Medications:     abacavir-dolutegravir-lamivud (TRIUMEQ) 600- mg Tab, Take 1 tablet by mouth once daily., Disp: 90 tablet, Rfl: 3    ascorbic acid, vitamin C, (VITAMIN C) 100 MG tablet, Take 100 mg by mouth once daily., Disp: , Rfl:     b complex vitamins capsule, Take 1 capsule by mouth once daily., Disp: , Rfl:     LORazepam (ATIVAN) 0.5 MG tablet, TAKE 1 TABLET BY MOUTH EVERY DAY, Disp: 30 tablet, Rfl: 1    omeprazole (PRILOSEC) 40 MG capsule, TAKE 1 CAPSULE BY MOUTH EVERY DAY, Disp: 90 capsule, Rfl: 3     pravastatin (PRAVACHOL) 10 MG tablet, TAKE 1 TABLET BY MOUTH EVERY DAY, Disp: 90 tablet, Rfl: 3    sulfamethoxazole-trimethoprim 800-160mg (BACTRIM DS) 800-160 mg Tab, TAKE 1 TABLET BY MOUTH TWICE DAILY FOR 7 DAYS, Disp: 14 tablet, Rfl: 0    triamcinolone acetonide 0.1% (KENALOG) 0.1 % cream, Apply topically 2 (two) times daily., Disp: 45 g, Rfl: 0    TRIUMEQ 600- mg Tab, TAKE 1 TABLET BY MOUTH EVERY DAY, Disp: 90 tablet, Rfl: 3    vitamin E 100 UNIT capsule, Take 100 Units by mouth once daily., Disp: , Rfl:     Review of Systems   Constitutional: Negative for activity change, chills, diaphoresis, fatigue and fever.   HENT: Negative for ear pain, mouth sores, nosebleeds and trouble swallowing.    Eyes: Negative for pain and visual disturbance.   Respiratory: Negative for cough, chest tightness and shortness of breath.    Cardiovascular: Negative for chest pain, palpitations and leg swelling.   Gastrointestinal: Negative for abdominal distention, abdominal pain and blood in stool.   Endocrine: Negative for polydipsia, polyphagia and polyuria.   Genitourinary: Negative for flank pain and frequency.   Musculoskeletal: Negative for gait problem, joint swelling, neck pain and neck stiffness.   Skin: Positive for rash. Negative for color change and pallor.   Neurological: Negative for dizziness, syncope and light-headedness.   Hematological: Negative for adenopathy. Does not bruise/bleed easily.   Psychiatric/Behavioral: Negative for confusion, dysphoric mood, hallucinations, self-injury, sleep disturbance and suicidal ideas. The patient is not nervous/anxious and is not hyperactive.            Objective:      Vitals:    02/10/22 0834   BP: 138/82   Pulse: 62   Resp: 18     Physical Exam  Vitals and nursing note reviewed.   Constitutional:       General: He is not in acute distress.     Appearance: Normal appearance. He is well-developed and normal weight. He is not ill-appearing.   HENT:      Head:  Normocephalic and atraumatic. No right periorbital erythema or left periorbital erythema.     Eyes:      General: Lids are normal. Vision grossly intact. No scleral icterus.     Conjunctiva/sclera: Conjunctivae normal.      Pupils: Pupils are equal, round, and reactive to light.   Neck:      Vascular: No JVD.      Trachea: No tracheal deviation.   Cardiovascular:      Rate and Rhythm: Normal rate.   Pulmonary:      Effort: Pulmonary effort is normal. No respiratory distress.   Musculoskeletal:         General: No tenderness. Normal range of motion.      Cervical back: Neck supple.   Skin:     General: Skin is warm and dry.      Findings: No erythema or rash.   Neurological:      Mental Status: He is alert and oriented to person, place, and time. Mental status is at baseline.   Psychiatric:         Attention and Perception: Attention and perception normal.         Mood and Affect: Mood and affect normal.         Speech: Speech normal.         Behavior: Behavior normal. Behavior is cooperative.         Thought Content: Thought content normal.         Cognition and Memory: Cognition and memory normal.         Judgment: Judgment normal.             Assessment/Plan:       1. Cellulitis of lower eyelid    2. Human immunodeficiency virus (HIV) disease    3. Screening for colon cancer    4. Screening for STD (sexually transmitted disease)    5. Positive RPR test      Problem List Items Addressed This Visit        ID    Human immunodeficiency virus (HIV) disease    Current Assessment & Plan     Currently on Triumeq. 100% compliant. No side effects. Will get labs updated today. He will f/u in clinic in 2 weeks to discuss. Continue meds.      January 2020 labs as follows:     CD4 893  RPR nonreactive  Macrocytic anemia but otherwise unremarkable  CMP WNL  HIV-1 RNA not detected.       February 2022 Labs =     HIV-1 RNA, Quantitative, Real-Time PCR IG    HIV-1 RNA, QN PCR IG<20 DETECTED NOT DETECTED copies/mL    HIV-1 RNA, QN  PCR IG<1.30 DETECTED NOT DETECTED Log copies/mL       Reportable Range: 20 copies/mL to 10,000,000 copies/mL  (1.30 log copies/mL to 7.00 log copies/mL).  LYMPHOCYTE SUBSET PANEL 4 IG    % CD4 (HELPER CELLS) IG34 30-61 %    Absolute CD4+ Cells  490-1740 cells/uL    %CD8 SUPPRESSOR T CELL IG49 H 12-42 %    Absolute CD8+ Cells  180-1170 cells/uL    CD4/CD8 Ratio IG0.70 L 0.86-5.00    Absolute Lymphocytes CQ1995 850-3900 cells/uL              POST VISIT ADDENDUM    RPR REACTIVE   1:64    Will have him RTC for discussion and tx.            Relevant Orders    Ambulatory referral/consult to Gastroenterology      Other Visit Diagnoses     Cellulitis of lower eyelid    -  Primary    Cover w/ Bactrim Oral and topical triamcinolone. Consider this to be primarily contact irritation from Hydrogen peroxide. Advised to STOP peroxide. RTC if worse    Screening for colon cancer        Relevant Orders    Ambulatory referral/consult to Gastroenterology    Screening for STD (sexually transmitted disease)        Relevant Orders    C. trachomatis/N. gonorrhoeae by AMP DNA Other; Urine (Completed)    Positive RPR test        Notified. will schedule him for Bicillin 1.2 m U x 1 dose for secondary          Office Visit on 02/10/2022   Component Date Value Ref Range Status    Source 02/10/2022 URINE   Final    Chlamydia 02/10/2022 NEGATIVE  NEGATIVE Final    Comment: Testing performed using GenProbe APTIMA COMBO 2 Assay which utilizes  a target amplification nucleic acid probe.      Gonorrhea 02/10/2022 NEGATIVE  NEGATIVE Final    Comment: Testing performed using GenProbe APTIMA COMBO 2 Assay which utilizes  a target amplification nucleic acid probe.  NOTE  Testing performed at:  [facility], [address] [place] [address]  [id number] CLIA #:[id number]        No results found in the last 30 days.      Duration of encounter: minutes  This includes face-to-face time and non face-to-face time preparing to see the patient  (eg, review of tests), obtaining and/or reviewing separately obtained history, documenting clinical information in the electronic or other health record, independently interpreting resultsand communicating results to the patient/family/caregiver, or care coordination.      All diagnostic data (labs/imaging) was reviewed with the patient and/or family member in the room.  All questions were answered to their liking. The patient and/or family member voiced understanding of all instructions provided. Expectations regarding follow up and treatment plan were voiced and confirmed prior to departure. The patient was given orders/instructions at the end of the visit for reference. They were instructed to notify my office if they have not been contacted for imaging/referrals/labs/results in 1-2 weeks. They voiced understanding of all of the above.     Follow up:     There are no Patient Instructions on file for this visit.     Follow up in about 1 day (around 2/11/2022), or if symptoms worsen or fail to improve.

## 2022-02-10 NOTE — TELEPHONE ENCOUNTER
Means that he needs an apt tomorrow to discuss the result and treatment of a positive syphilis test.  Have servando fit him in

## 2022-02-10 NOTE — ASSESSMENT & PLAN NOTE
Currently on Triumeq. 100% compliant. No side effects. Will get labs updated today. He will f/u in clinic in 2 weeks to discuss. Continue meds.      January 2020 labs as follows:     CD4 893  RPR nonreactive  Macrocytic anemia but otherwise unremarkable  CMP WNL  HIV-1 RNA not detected.       February 2022 Labs =     HIV-1 RNA, Quantitative, Real-Time PCR IG    HIV-1 RNA, QN PCR IG<20 DETECTED NOT DETECTED copies/mL    HIV-1 RNA, QN PCR IG<1.30 DETECTED NOT DETECTED Log copies/mL       Reportable Range: 20 copies/mL to 10,000,000 copies/mL  (1.30 log copies/mL to 7.00 log copies/mL).  LYMPHOCYTE SUBSET PANEL 4 IG    % CD4 (HELPER CELLS) IG34 30-61 %    Absolute CD4+ Cells  490-1740 cells/uL    %CD8 SUPPRESSOR T CELL IG49 H 12-42 %    Absolute CD8+ Cells  180-1170 cells/uL    CD4/CD8 Ratio IG0.70 L 0.86-5.00    Absolute Lymphocytes [id number] 850-3900 cells/uL              POST VISIT ADDENDUM    RPR REACTIVE   1:64    Will have him RTC for discussion and tx.

## 2022-02-11 LAB
CHLAMYDIA: NEGATIVE
GONORRHEA: NEGATIVE
SOURCE: NORMAL

## 2022-02-15 ENCOUNTER — OFFICE VISIT (OUTPATIENT)
Dept: INFECTIOUS DISEASES | Facility: CLINIC | Age: 47
End: 2022-02-15
Payer: COMMERCIAL

## 2022-02-15 VITALS
DIASTOLIC BLOOD PRESSURE: 78 MMHG | OXYGEN SATURATION: 97 % | RESPIRATION RATE: 18 BRPM | HEART RATE: 78 BPM | SYSTOLIC BLOOD PRESSURE: 129 MMHG

## 2022-02-15 DIAGNOSIS — R21 RASH OF FACE: ICD-10-CM

## 2022-02-15 DIAGNOSIS — A51.49 SECONDARY SYPHILIS: Primary | ICD-10-CM

## 2022-02-15 PROCEDURE — 3078F DIAST BP <80 MM HG: CPT | Mod: CPTII,S$GLB,, | Performed by: NURSE PRACTITIONER

## 2022-02-15 PROCEDURE — 96372 THER/PROPH/DIAG INJ SC/IM: CPT | Mod: S$GLB,,, | Performed by: NURSE PRACTITIONER

## 2022-02-15 PROCEDURE — 3074F SYST BP LT 130 MM HG: CPT | Mod: CPTII,S$GLB,, | Performed by: NURSE PRACTITIONER

## 2022-02-15 PROCEDURE — 99213 PR OFFICE/OUTPT VISIT, EST, LEVL III, 20-29 MIN: ICD-10-PCS | Mod: 25,S$GLB,, | Performed by: NURSE PRACTITIONER

## 2022-02-15 PROCEDURE — 99213 OFFICE O/P EST LOW 20 MIN: CPT | Mod: 25,S$GLB,, | Performed by: NURSE PRACTITIONER

## 2022-02-15 PROCEDURE — 1159F PR MEDICATION LIST DOCUMENTED IN MEDICAL RECORD: ICD-10-PCS | Mod: CPTII,S$GLB,, | Performed by: NURSE PRACTITIONER

## 2022-02-15 PROCEDURE — 3074F PR MOST RECENT SYSTOLIC BLOOD PRESSURE < 130 MM HG: ICD-10-PCS | Mod: CPTII,S$GLB,, | Performed by: NURSE PRACTITIONER

## 2022-02-15 PROCEDURE — 1159F MED LIST DOCD IN RCRD: CPT | Mod: CPTII,S$GLB,, | Performed by: NURSE PRACTITIONER

## 2022-02-15 PROCEDURE — 3078F PR MOST RECENT DIASTOLIC BLOOD PRESSURE < 80 MM HG: ICD-10-PCS | Mod: CPTII,S$GLB,, | Performed by: NURSE PRACTITIONER

## 2022-02-15 PROCEDURE — 96372 PR INJECTION,THERAP/PROPH/DIAG2ST, IM OR SUBCUT: ICD-10-PCS | Mod: S$GLB,,, | Performed by: NURSE PRACTITIONER

## 2022-02-15 RX ORDER — CLOTRIMAZOLE AND BETAMETHASONE DIPROPIONATE 10; .64 MG/G; MG/G
CREAM TOPICAL 2 TIMES DAILY
Qty: 45 G | Refills: 1 | Status: SHIPPED | OUTPATIENT
Start: 2022-02-15 | End: 2022-06-26 | Stop reason: SDUPTHER

## 2022-02-15 NOTE — ASSESSMENT & PLAN NOTE
1st occurrence. Currently asymptomatic.     Administer Bicillin LA 2.4 mill U x 1 dose. Repeat RPR in 3 months to ensure 4 four decline in levels. Given order prior to departure.

## 2022-02-15 NOTE — PROGRESS NOTES
Infectious Diseases Clinic Note    Subjective:       Patient ID: Carl Lorenzo is a 46 y.o. male     Chief Complaint: Results        Carl Lorenzo is a 44 y.o. male here today for 6 mth f/u regarding HIV.       He reports that he was diagnosed in approximately 2005. He is currently on Triumeq. Previous ID MD was in [place].  He reports that he responded very well to therapy and has been doing well since that time. He is 100% compliant with meds. He has not missed any doses in the past month.  No side effects reported.   Denies opportunistic infections.  Denies fever chills myalgias lymphadenopathy. Denies HA, visual changes, genital lesion, neck pain/stiffness. Here to discuss positive RPR. This is his first episode.     He does currently have an irritation under his right eyelid. Ongoing for >2 weeks.  Initially thought was a bug bite.  Ruptured and remained erythematous since that time. He tried oral bactrim and topical triamcinolone w/o improvement.  Denies pain.  Denies swelling.  Denies growth.  No visual disturbance.  He does admit to applying hydrogen peroxide over the past week.       No other issues reported at this time.                Past Medical History:   Diagnosis Date    GERD (gastroesophageal reflux disease)     HIV (human immunodeficiency virus infection) 01/01/2004    Hypertension        Social History     Socioeconomic History    Marital status:    Tobacco Use    Smoking status: Never Smoker    Smokeless tobacco: Never Used   Substance and Sexual Activity    Alcohol use: Yes         Current Outpatient Medications:     abacavir-dolutegravir-lamivud (TRIUMEQ) 600- mg Tab, Take 1 tablet by mouth once daily., Disp: 90 tablet, Rfl: 3    ascorbic acid, vitamin C, (VITAMIN C) 100 MG tablet, Take 100 mg by mouth once daily., Disp: , Rfl:     b complex vitamins capsule, Take 1 capsule by mouth once daily., Disp: , Rfl:     clotrimazole-betamethasone 1-0.05%  (LOTRISONE) cream, Apply topically 2 (two) times daily. for 21 days, Disp: 45 g, Rfl: 1    LORazepam (ATIVAN) 0.5 MG tablet, TAKE 1 TABLET BY MOUTH EVERY DAY, Disp: 30 tablet, Rfl: 1    omeprazole (PRILOSEC) 40 MG capsule, TAKE 1 CAPSULE BY MOUTH EVERY DAY, Disp: 90 capsule, Rfl: 3    pravastatin (PRAVACHOL) 10 MG tablet, TAKE 1 TABLET BY MOUTH EVERY DAY, Disp: 90 tablet, Rfl: 3    sulfamethoxazole-trimethoprim 800-160mg (BACTRIM DS) 800-160 mg Tab, Take 1 tablet by mouth 2 (two) times daily. for 7 days, Disp: 14 tablet, Rfl: 0    triamcinolone acetonide 0.1% (KENALOG) 0.1 % cream, Apply topically 2 (two) times daily., Disp: 45 g, Rfl: 0    TRIUMEQ 600- mg Tab, TAKE 1 TABLET BY MOUTH EVERY DAY, Disp: 90 tablet, Rfl: 3    vitamin E 100 UNIT capsule, Take 100 Units by mouth once daily., Disp: , Rfl:     Current Facility-Administered Medications:     penicillin G benzathine (BICILLIN LA) injection 2.4 Million Units, 2.4 Million Units, Intramuscular, 1 time in Clinic/HOD, Jose Boykin NP    Review of Systems   Constitutional: Negative for activity change, chills, diaphoresis, fatigue and fever.   HENT: Negative for ear pain, mouth sores, nosebleeds and trouble swallowing.    Eyes: Negative for pain and visual disturbance.   Respiratory: Negative for cough, chest tightness and shortness of breath.    Cardiovascular: Negative for chest pain, palpitations and leg swelling.   Gastrointestinal: Negative for abdominal distention, abdominal pain and blood in stool.   Endocrine: Negative for polydipsia, polyphagia and polyuria.   Genitourinary: Negative for flank pain and frequency.   Musculoskeletal: Negative for gait problem, joint swelling, neck pain and neck stiffness.   Skin: Positive for rash. Negative for color change and pallor.   Neurological: Negative for dizziness, syncope, light-headedness and headaches.   Hematological: Negative for adenopathy. Does not bruise/bleed easily.    Psychiatric/Behavioral: Negative for confusion, dysphoric mood, hallucinations, self-injury, sleep disturbance and suicidal ideas. The patient is not nervous/anxious and is not hyperactive.            Objective:      Vitals:    02/15/22 1522   BP: 129/78   Pulse: 78   Resp: 18     Physical Exam  Vitals and nursing note reviewed.   Constitutional:       General: He is not in acute distress.     Appearance: Normal appearance. He is well-developed and normal weight. He is not ill-appearing.   HENT:      Head: Normocephalic and atraumatic. No right periorbital erythema or left periorbital erythema.     Eyes:      General: Lids are normal. Vision grossly intact. No scleral icterus.     Conjunctiva/sclera: Conjunctivae normal.      Pupils: Pupils are equal, round, and reactive to light.   Neck:      Vascular: No JVD.      Trachea: No tracheal deviation.   Cardiovascular:      Rate and Rhythm: Normal rate.   Pulmonary:      Effort: Pulmonary effort is normal. No respiratory distress.   Musculoskeletal:         General: No tenderness. Normal range of motion.      Cervical back: Normal range of motion and neck supple.   Skin:     General: Skin is warm and dry.      Findings: No erythema or rash.   Neurological:      Mental Status: He is alert and oriented to person, place, and time. Mental status is at baseline.   Psychiatric:         Attention and Perception: Attention and perception normal.         Mood and Affect: Mood and affect normal.         Speech: Speech normal.         Behavior: Behavior normal. Behavior is cooperative.         Thought Content: Thought content normal.         Cognition and Memory: Cognition and memory normal.         Judgment: Judgment normal.             Assessment/Plan:       1. Secondary syphilis    2. Rash of face      Problem List Items Addressed This Visit        ID    Secondary syphilis - Primary    Current Assessment & Plan     1st occurrence. Currently asymptomatic.     Administer  Bicillin LA 2.4 mill U x 1 dose. Repeat RPR in 3 months to ensure 4 four decline in levels. Given order prior to departure.          Relevant Medications    penicillin G benzathine (BICILLIN LA) injection 2.4 Million Units    Other Relevant Orders    Treponema Pallidum (Syphillis) Antibody      Other Visit Diagnoses     Rash of face        Still has rash of L lower eyelid despite oral bactrim and triamcinolone. refer to derm. Rx lotrisone in the meantime.     Relevant Medications    clotrimazole-betamethasone 1-0.05% (LOTRISONE) cream    Other Relevant Orders    Ambulatory referral/consult to Dermatology         Office Visit on 02/10/2022   Component Date Value Ref Range Status    Source 02/10/2022 URINE   Final    Chlamydia 02/10/2022 NEGATIVE  NEGATIVE Final    Comment: Testing performed using GenProbe APTIMA COMBO 2 Assay which utilizes  a target amplification nucleic acid probe.      Gonorrhea 02/10/2022 NEGATIVE  NEGATIVE Final    Comment: Testing performed using GenProbe APTIMA COMBO 2 Assay which utilizes  a target amplification nucleic acid probe.  NOTE  Testing performed at:  [facility], [address] [place] [address]  [id number] CLIA #:[id number]        No results found in the last 30 days.      Duration of encounter: minutes  This includes face-to-face time and non face-to-face time preparing to see the patient (eg, review of tests), obtaining and/or reviewing separately obtained history, documenting clinical information in the electronic or other health record, independently interpreting resultsand communicating results to the patient/family/caregiver, or care coordination.      All diagnostic data (labs/imaging) was reviewed with the patient and/or family member in the room.  All questions were answered to their liking. The patient and/or family member voiced understanding of all instructions provided. Expectations regarding follow up and treatment plan were voiced and confirmed prior to  departure. The patient was given orders/instructions at the end of the visit for reference. They were instructed to notify my office if they have not been contacted for imaging/referrals/labs/results in 1-2 weeks. They voiced understanding of all of the above.     Follow up:     There are no Patient Instructions on file for this visit.     Follow up in about 3 months (around 5/15/2022), or if symptoms worsen or fail to improve.

## 2022-02-28 ENCOUNTER — TELEPHONE (OUTPATIENT)
Dept: FAMILY MEDICINE | Facility: CLINIC | Age: 47
End: 2022-02-28
Payer: COMMERCIAL

## 2022-02-28 ENCOUNTER — OFFICE VISIT (OUTPATIENT)
Dept: FAMILY MEDICINE | Facility: CLINIC | Age: 47
End: 2022-02-28
Payer: COMMERCIAL

## 2022-02-28 VITALS
RESPIRATION RATE: 18 BRPM | DIASTOLIC BLOOD PRESSURE: 99 MMHG | HEART RATE: 77 BPM | OXYGEN SATURATION: 98 % | SYSTOLIC BLOOD PRESSURE: 132 MMHG

## 2022-02-28 DIAGNOSIS — A49.02 MRSA INFECTION: ICD-10-CM

## 2022-02-28 DIAGNOSIS — L03.211 FACIAL CELLULITIS: Primary | ICD-10-CM

## 2022-02-28 PROCEDURE — 1159F MED LIST DOCD IN RCRD: CPT | Mod: CPTII,S$GLB,, | Performed by: NURSE PRACTITIONER

## 2022-02-28 PROCEDURE — 3080F DIAST BP >= 90 MM HG: CPT | Mod: CPTII,S$GLB,, | Performed by: NURSE PRACTITIONER

## 2022-02-28 PROCEDURE — 99213 OFFICE O/P EST LOW 20 MIN: CPT | Mod: S$GLB,,, | Performed by: NURSE PRACTITIONER

## 2022-02-28 PROCEDURE — 3075F PR MOST RECENT SYSTOLIC BLOOD PRESS GE 130-139MM HG: ICD-10-PCS | Mod: CPTII,S$GLB,, | Performed by: NURSE PRACTITIONER

## 2022-02-28 PROCEDURE — 3075F SYST BP GE 130 - 139MM HG: CPT | Mod: CPTII,S$GLB,, | Performed by: NURSE PRACTITIONER

## 2022-02-28 PROCEDURE — 99213 PR OFFICE/OUTPT VISIT, EST, LEVL III, 20-29 MIN: ICD-10-PCS | Mod: S$GLB,,, | Performed by: NURSE PRACTITIONER

## 2022-02-28 PROCEDURE — 1159F PR MEDICATION LIST DOCUMENTED IN MEDICAL RECORD: ICD-10-PCS | Mod: CPTII,S$GLB,, | Performed by: NURSE PRACTITIONER

## 2022-02-28 PROCEDURE — 3080F PR MOST RECENT DIASTOLIC BLOOD PRESSURE >= 90 MM HG: ICD-10-PCS | Mod: CPTII,S$GLB,, | Performed by: NURSE PRACTITIONER

## 2022-02-28 RX ORDER — MINOCYCLINE HYDROCHLORIDE 100 MG/1
100 CAPSULE ORAL EVERY 12 HOURS
Qty: 14 CAPSULE | Refills: 0 | Status: SHIPPED | OUTPATIENT
Start: 2022-02-28 | End: 2022-03-07

## 2022-02-28 NOTE — PROGRESS NOTES
Infectious Diseases Clinic Note    Subjective:       Patient ID: Carl Lorenzo is a 46 y.o. male     Chief Complaint: Follow-up (Left chin skin infection X2 days. Some pain and mild drainage; has moved to left jaw)        Carl Lorenzo is a 44 y.o. male    Hx of HIV, + RPR    Here today with complaints of left jaw/chin pain/swelling. Onset 2 days ago. He initially thought there was an ingrown hair so he tried to pick at the area on his face.  Reports induration to Left chin. + erythema, warmth, throbbing. Extending toward his jaw line.  Denies dental pain, abscess, sore throat, hoarseness, difficulty swallowing, drooling. Denies fever, chills, body aches. Denies preseptal/periorbital involvement. He states he took two bactrim DS yesterday and one this AM. No improvement.  No visual disturbance.      No other issues reported at this time.                Past Medical History:   Diagnosis Date    GERD (gastroesophageal reflux disease)     HIV (human immunodeficiency virus infection) 01/01/2004    Hypertension        Social History     Socioeconomic History    Marital status:    Tobacco Use    Smoking status: Never Smoker    Smokeless tobacco: Never Used   Substance and Sexual Activity    Alcohol use: Yes         Current Outpatient Medications:     abacavir-dolutegravir-lamivud (TRIUMEQ) 600- mg Tab, Take 1 tablet by mouth once daily., Disp: 90 tablet, Rfl: 3    ascorbic acid, vitamin C, (VITAMIN C) 100 MG tablet, Take 100 mg by mouth once daily., Disp: , Rfl:     b complex vitamins capsule, Take 1 capsule by mouth once daily., Disp: , Rfl:     clotrimazole-betamethasone 1-0.05% (LOTRISONE) cream, Apply topically 2 (two) times daily. for 21 days, Disp: 45 g, Rfl: 1    LORazepam (ATIVAN) 0.5 MG tablet, TAKE 1 TABLET BY MOUTH EVERY DAY, Disp: 30 tablet, Rfl: 1    minocycline (MINOCIN,DYNACIN) 100 MG capsule, Take 1 capsule (100 mg total) by mouth every 12 (twelve) hours. for 7 days,  Disp: 14 capsule, Rfl: 0    omeprazole (PRILOSEC) 40 MG capsule, TAKE 1 CAPSULE BY MOUTH EVERY DAY, Disp: 90 capsule, Rfl: 3    pravastatin (PRAVACHOL) 10 MG tablet, TAKE 1 TABLET BY MOUTH EVERY DAY, Disp: 90 tablet, Rfl: 3    sulfamethoxazole-trimethoprim 800-160mg (BACTRIM DS) 800-160 mg Tab, TAKE 1 TABLET BY MOUTH TWICE DAILY FOR 7 DAYS, Disp: 14 tablet, Rfl: 0    triamcinolone acetonide 0.1% (KENALOG) 0.1 % cream, Apply topically 2 (two) times daily., Disp: 45 g, Rfl: 0    TRIUMEQ 600- mg Tab, TAKE 1 TABLET BY MOUTH EVERY DAY, Disp: 90 tablet, Rfl: 3    vitamin E 100 UNIT capsule, Take 100 Units by mouth once daily., Disp: , Rfl:     Review of Systems   Constitutional: Negative for activity change, chills, diaphoresis, fatigue and fever.   HENT: Negative for dental problem, ear pain, facial swelling, mouth sores, nosebleeds, sinus pain, sore throat, trouble swallowing and voice change.    Eyes: Negative for pain and visual disturbance.   Respiratory: Negative for cough, chest tightness and shortness of breath.    Cardiovascular: Negative for chest pain, palpitations and leg swelling.   Gastrointestinal: Negative for abdominal distention, abdominal pain and blood in stool.   Endocrine: Negative for polydipsia, polyphagia and polyuria.   Genitourinary: Negative for flank pain and frequency.   Musculoskeletal: Negative for gait problem, joint swelling, neck pain and neck stiffness.   Skin: Positive for wound. Negative for color change, pallor and rash.   Neurological: Negative for dizziness, syncope, light-headedness and headaches.   Hematological: Negative for adenopathy. Does not bruise/bleed easily.   Psychiatric/Behavioral: Negative for confusion, dysphoric mood, hallucinations, self-injury, sleep disturbance and suicidal ideas. The patient is not nervous/anxious and is not hyperactive.            Objective:      Vitals:    02/28/22 1406   BP: (!) 132/99   Pulse: 77   Resp: 18     Physical  Exam  Vitals and nursing note reviewed.   Constitutional:       General: He is not in acute distress.     Appearance: Normal appearance. He is well-developed and normal weight. He is not ill-appearing.   HENT:      Head: Normocephalic and atraumatic. No right periorbital erythema or left periorbital erythema.      Jaw: Swelling present.      Salivary Glands: Left salivary gland is not diffusely enlarged or tender.     Eyes:      General: Lids are normal. Vision grossly intact. No scleral icterus.     Conjunctiva/sclera: Conjunctivae normal.      Pupils: Pupils are equal, round, and reactive to light.   Neck:      Vascular: No JVD.      Trachea: No tracheal deviation.   Cardiovascular:      Rate and Rhythm: Normal rate.   Pulmonary:      Effort: Pulmonary effort is normal. No respiratory distress.   Musculoskeletal:         General: No tenderness. Normal range of motion.      Cervical back: Normal range of motion and neck supple.   Lymphadenopathy:      Head:      Left side of head: No tonsillar adenopathy.      Cervical:      Left cervical: No superficial cervical adenopathy.   Skin:     General: Skin is warm and dry.      Findings: No erythema or rash.   Neurological:      Mental Status: He is alert and oriented to person, place, and time. Mental status is at baseline.   Psychiatric:         Attention and Perception: Attention and perception normal.         Mood and Affect: Mood and affect normal.         Speech: Speech normal.         Behavior: Behavior normal. Behavior is cooperative.         Thought Content: Thought content normal.         Cognition and Memory: Cognition and memory normal.         Judgment: Judgment normal.             Assessment/Plan:       1. Facial cellulitis    2. MRSA infection      Problem List Items Addressed This Visit    None     Visit Diagnoses     Facial cellulitis    -  Primary    Continue Bactrim DS bid. Start Minocycline if no improvement by Wednesday. He is on 2nd day of bactrim.  RTC if progressing to preseptal/orbit.     Relevant Medications    minocycline (MINOCIN,DYNACIN) 100 MG capsule    MRSA infection        Has 6 days of BActrim DS at home. Concerned for MRSA resistant to Bactrim since he was recently on it within past month. minocycline printed.          See AVS for education on cellulitis and warm compress instructions.       Office Visit on 02/10/2022   Component Date Value Ref Range Status    Source 02/10/2022 URINE   Final    Chlamydia 02/10/2022 NEGATIVE  NEGATIVE Final    Comment: Testing performed using GenProbe APTIMA COMBO 2 Assay which utilizes  a target amplification nucleic acid probe.      Gonorrhea 02/10/2022 NEGATIVE  NEGATIVE Final    Comment: Testing performed using GenProbe APTIMA COMBO 2 Assay which utilizes  a target amplification nucleic acid probe.  NOTE  Testing performed at:  [facility], [address] [place] [address]  [id number] CLIA #:[id number]              No results found in the last 30 days.      Duration of encounter: minutes  This includes face-to-face time and non face-to-face time preparing to see the patient (eg, review of tests), obtaining and/or reviewing separately obtained history, documenting clinical information in the electronic or other health record, independently interpreting resultsand communicating results to the patient/family/caregiver, or care coordination.      All diagnostic data (labs/imaging) was reviewed with the patient and/or family member in the room.  All questions were answered to their liking. The patient and/or family member voiced understanding of all instructions provided. Expectations regarding follow up and treatment plan were voiced and confirmed prior to departure. The patient was given orders/instructions at the end of the visit for reference. They were instructed to notify my office if they have not been contacted for imaging/referrals/labs/results in 1-2 weeks. They voiced understanding of all of the  above.     Follow up:     There are no Patient Instructions on file for this visit.     Follow up in about 1 week (around 3/7/2022), or if symptoms worsen or fail to improve.

## 2022-04-11 ENCOUNTER — PATIENT MESSAGE (OUTPATIENT)
Dept: FAMILY MEDICINE | Facility: CLINIC | Age: 47
End: 2022-04-11
Payer: COMMERCIAL

## 2022-04-11 DIAGNOSIS — A53.9 SYPHILIS: ICD-10-CM

## 2022-04-11 DIAGNOSIS — B20 HUMAN IMMUNODEFICIENCY VIRUS (HIV) DISEASE: Primary | ICD-10-CM

## 2022-04-11 DIAGNOSIS — Z11.3 SCREEN FOR STD (SEXUALLY TRANSMITTED DISEASE): ICD-10-CM

## 2022-04-11 DIAGNOSIS — Z11.3 SCREEN FOR STD (SEXUALLY TRANSMITTED DISEASE): Primary | ICD-10-CM

## 2022-04-11 LAB
AMORPH URATE CRY URNS QL MICRO: ABNORMAL
BACTERIA #/AREA URNS HPF: NEGATIVE /[HPF]
BILIRUB UR QL STRIP: NEGATIVE
CLARITY UR: CLEAR
COLOR UR: YELLOW
EPITHELIAL CELLS: ABNORMAL
GLUCOSE (UA): NEGATIVE MG/DL
KETONES UR QL STRIP: NEGATIVE MG/DL
LEUKOCYTE ESTERASE UR QL STRIP: NEGATIVE
MUCOUS THREADS URNS QL MICRO: ABNORMAL
NITRITE UR QL STRIP: NEGATIVE
OCCULT BLOOD: NEGATIVE
PH, URINE: 6 (ref 5–7.5)
PROT UR QL STRIP: NEGATIVE MG/DL
RBC/HPF: NEGATIVE
SP GR UR STRIP: 1.02 (ref 1–1.03)
SPERMATAZOA: ABNORMAL
UROBILINOGEN, URINE: NORMAL E.U./DL (ref 0–1)
WBC/HPF: NEGATIVE

## 2022-04-12 ENCOUNTER — PATIENT MESSAGE (OUTPATIENT)
Dept: FAMILY MEDICINE | Facility: CLINIC | Age: 47
End: 2022-04-12
Payer: COMMERCIAL

## 2022-04-12 PROBLEM — R76.8 HSV-2 SEROPOSITIVE: Status: ACTIVE | Noted: 2022-04-12

## 2022-04-12 LAB
HSV1 IGG SER-ACNC: REACTIVE
HSV2 IGG SER-ACNC: REACTIVE

## 2022-04-12 NOTE — TELEPHONE ENCOUNTER
Schedule him and appointment so we can discuss what steps need to be taken and the options. Nothing needs to happen in this moment. Next week would be fine, because it would allow time for the other tests to result.

## 2022-04-13 ENCOUNTER — PATIENT MESSAGE (OUTPATIENT)
Dept: INFECTIOUS DISEASES | Facility: CLINIC | Age: 47
End: 2022-04-13
Payer: COMMERCIAL

## 2022-04-13 DIAGNOSIS — R76.8 HSV-2 SEROPOSITIVE: Primary | ICD-10-CM

## 2022-04-13 LAB
ABS NRBC COUNT: 0 X 10 3/UL (ref 0–0.01)
ABSOLUTE BASOPHIL: 0.02 X 10 3/UL (ref 0–0.22)
ABSOLUTE EOSINOPHIL: 0.05 X 10 3/UL (ref 0.04–0.54)
ABSOLUTE IMMATURE GRAN: 0.03 X 10 3/UL (ref 0–0.04)
ABSOLUTE LYMPHOCYTE: 2.4 X 10 3/UL (ref 0.86–4.75)
ABSOLUTE MONOCYTE: 0.46 X 10 3/UL (ref 0.22–1.08)
ADDITIONAL TESTING: NORMAL
ALBUMIN SERPL-MCNC: 4.9 G/DL (ref 3.5–5.2)
ALBUMIN/GLOB SERPL ELPH: 1.9 {RATIO} (ref 1–2.7)
ALP ISOS SERPL LEV INH-CCNC: 84 U/L (ref 40–130)
ALT (SGPT): 18 U/L (ref 0–41)
ANION GAP SERPL CALC-SCNC: 11 MMOL/L (ref 8–17)
AST SERPL-CCNC: 17 U/L (ref 0–40)
BASOPHILS NFR BLD: 0.2 % (ref 0.2–1.2)
BILIRUBIN, TOTAL: 0.42 MG/DL (ref 0–1.2)
BUN/CREAT SERPL: 13.8 (ref 6–20)
CALCIUM SERPL-MCNC: 9.7 MG/DL (ref 8.6–10.2)
CARBON DIOXIDE, CO2: 27 MMOL/L (ref 22–29)
CHLAMYDIA: NEGATIVE
CHLORIDE: 102 MMOL/L (ref 98–107)
CREAT SERPL-MCNC: 1.35 MG/DL (ref 0.7–1.2)
EOSINOPHIL NFR BLD: 0.6 % (ref 0.7–7)
GFR ESTIMATION: 56.41
GLOBULIN: 2.6 G/DL (ref 1.5–4.5)
GLUCOSE: 104 MG/DL (ref 74–106)
GONORRHEA: NEGATIVE
HBV CORE IGM SERPL QL IA: NONREACTIVE
HBV SURFACE AG SERPL QL IA: NONREACTIVE
HCT VFR BLD AUTO: 41.5 % (ref 42–52)
HCV IGG SERPL QL IA: NONREACTIVE
HEPATITIS A IGM: NONREACTIVE
HGB BLD-MCNC: 14.1 G/DL (ref 14–18)
IMMATURE GRANULOCYTES: 0.4 % (ref 0–0.5)
LYMPHOCYTES NFR BLD: 28.1 % (ref 19.3–53.1)
MCH RBC QN AUTO: 31.8 PG (ref 27–32)
MCHC RBC AUTO-ENTMCNC: 34 G/DL (ref 32–36)
MCV RBC AUTO: 93.5 FL (ref 80–94)
MONOCYTES NFR BLD: 5.4 % (ref 4.7–12.5)
NEUTROPHILS # BLD AUTO: 5.58 X 10 3/UL (ref 2.15–7.56)
NEUTROPHILS NFR BLD: 65.3 % (ref 34–71.1)
NUCLEATED RED BLOOD CELLS: 0 /100 WBC (ref 0–0.2)
PLATELET # BLD AUTO: 194 X 10 3/UL (ref 135–400)
POTASSIUM: 3.7 MMOL/L (ref 3.5–5.1)
PROT SNV-MCNC: 7.5 G/DL (ref 6.4–8.3)
RBC # BLD AUTO: 4.44 X 10 6/UL (ref 4.7–6.1)
RDW-SD: 44.8 FL (ref 37–54)
RPR REFLEX: REACTIVE
RPR TITER: ABNORMAL
SODIUM: 140 MMOL/L (ref 136–145)
SOURCE: NORMAL
SOURCE: NORMAL
SYPHILIS TREPONEMAL ANTIBODY: REACTIVE
TRICHOMONAS AMPLIFIED: NEGATIVE
UREA NITROGEN (BUN): 18.6 MG/DL (ref 6–20)
WBC # BLD: 8.54 X 10 3/UL (ref 4.3–10.8)

## 2022-04-13 RX ORDER — VALACYCLOVIR HYDROCHLORIDE 1 G/1
1000 TABLET, FILM COATED ORAL 2 TIMES DAILY
Qty: 14 TABLET | Refills: 0 | Status: SHIPPED | OUTPATIENT
Start: 2022-04-13 | End: 2022-04-21

## 2022-04-15 LAB
HSV 1 IGM TITER: NORMAL
HSV 1 IGM, IFA: NEGATIVE
HSV 2 IGM TITER: NORMAL
HSV 2 IGM, IFA: NEGATIVE

## 2022-04-26 ENCOUNTER — OFFICE VISIT (OUTPATIENT)
Dept: INFECTIOUS DISEASES | Facility: CLINIC | Age: 47
End: 2022-04-26
Payer: COMMERCIAL

## 2022-04-26 VITALS — DIASTOLIC BLOOD PRESSURE: 78 MMHG | HEART RATE: 50 BPM | OXYGEN SATURATION: 97 % | SYSTOLIC BLOOD PRESSURE: 132 MMHG

## 2022-04-26 DIAGNOSIS — B20 HUMAN IMMUNODEFICIENCY VIRUS (HIV) DISEASE: ICD-10-CM

## 2022-04-26 DIAGNOSIS — A51.49 SECONDARY SYPHILIS: ICD-10-CM

## 2022-04-26 DIAGNOSIS — R76.8 HSV-2 SEROPOSITIVE: Primary | ICD-10-CM

## 2022-04-26 PROCEDURE — 99213 OFFICE O/P EST LOW 20 MIN: CPT | Mod: S$GLB,,, | Performed by: NURSE PRACTITIONER

## 2022-04-26 PROCEDURE — 99213 PR OFFICE/OUTPT VISIT, EST, LEVL III, 20-29 MIN: ICD-10-PCS | Mod: S$GLB,,, | Performed by: NURSE PRACTITIONER

## 2022-04-26 PROCEDURE — 3078F DIAST BP <80 MM HG: CPT | Mod: CPTII,S$GLB,, | Performed by: NURSE PRACTITIONER

## 2022-04-26 PROCEDURE — 3078F PR MOST RECENT DIASTOLIC BLOOD PRESSURE < 80 MM HG: ICD-10-PCS | Mod: CPTII,S$GLB,, | Performed by: NURSE PRACTITIONER

## 2022-04-26 PROCEDURE — 3075F SYST BP GE 130 - 139MM HG: CPT | Mod: CPTII,S$GLB,, | Performed by: NURSE PRACTITIONER

## 2022-04-26 PROCEDURE — 3075F PR MOST RECENT SYSTOLIC BLOOD PRESS GE 130-139MM HG: ICD-10-PCS | Mod: CPTII,S$GLB,, | Performed by: NURSE PRACTITIONER

## 2022-04-26 NOTE — ASSESSMENT & PLAN NOTE
Currently on Triumeq. 100% compliant. No side effects. Will get labs updated today. He will f/u in clinic in 2 weeks to discuss. Continue meds.      January 2020 labs as follows:     CD4 893  RPR nonreactive  Macrocytic anemia but otherwise unremarkable  CMP WNL  HIV-1 RNA not detected.         February 2022 Labs =      HIV-1 RNA, Quantitative, Real-Time PCR IG    HIV-1 RNA, QN PCR IG<20 DETECTED NOT DETECTED copies/mL    HIV-1 RNA, QN PCR IG<1.30 DETECTED NOT DETECTED Log copies/mL        Reportable Range: 20 copies/mL to 10,000,000 copies/mL  (1.30 log copies/mL to 7.00 log copies/mL).  LYMPHOCYTE SUBSET PANEL 4 IG    % CD4 (HELPER CELLS) IG34 30-61 %    Absolute CD4+ Cells  490-1740 cells/uL    %CD8 SUPPRESSOR T CELL IG49 H 12-42 %    Absolute CD8+ Cells  180-1170 cells/uL    CD4/CD8 Ratio IG0.70 L 0.86-5.00    Absolute Lymphocytes [id number] 850-3900 cells/uL        PLAN  1. We discussed labs from February.   2. Recommend repeat testing in August.

## 2022-04-26 NOTE — ASSESSMENT & PLAN NOTE
No need for suppressive therapy at this time. Advised he would need > 4-5 outbreaks per year to go that route.

## 2022-04-26 NOTE — PROGRESS NOTES
Infectious Diseases Clinic Note    Subjective:       Patient ID: Carl Lorenzo is a 46 y.o. male     Chief Complaint: Follow-up (HIV FU) and Results        Carl Lorenzo is a 44 y.o. male here today f/u regarding HIV and syphilis      He reports that he was diagnosed in approximately 2005. He is currently on Triumeq. Previous ID MD was in [place].  He reports that he responded very well to therapy and has been doing well since that time. He is 100% compliant with meds. He has not missed any doses in the past month.  No side effects reported.  He had labs done prior to arrival and is here to discuss.  Denies opportunistic infections.  Denies fever chills myalgias lymphadenopathy.     The patient had labs done prior to arrival and is here to discuss them.  He specifically requested STD check.  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 Has less than 4 outbreaks per year.  His RPR is 1:8 now and declined to expected value.     No other issues reported at this time.                Past Medical History:   Diagnosis Date    GERD (gastroesophageal reflux disease)     HIV (human immunodeficiency virus infection) 01/01/2004    Hypertension        Social History     Socioeconomic History    Marital status:    Tobacco Use    Smoking status: Never Smoker    Smokeless tobacco: Never Used   Substance and Sexual Activity    Alcohol use: Yes         Current Outpatient Medications:     abacavir-dolutegravir-lamivud (TRIUMEQ) 600- mg Tab, Take 1 tablet by mouth once daily., Disp: 90 tablet, Rfl: 3    ascorbic acid, vitamin C, (VITAMIN C) 100 MG tablet, Take 100 mg by mouth once daily., Disp: , Rfl:     b complex vitamins capsule, Take 1 capsule by mouth once daily., Disp: , Rfl:     LORazepam (ATIVAN) 0.5 MG tablet, TAKE 1 TABLET BY MOUTH EVERY DAY, Disp: 30 tablet, Rfl: 1    minocycline (MINOCIN,DYNACIN) 100 MG capsule, TAKE 1 CAPSULE BY MOUTH EVERY 12 HOURS FOR 7  DAYS, Disp: 14 capsule, Rfl: 0    omeprazole (PRILOSEC) 40 MG capsule, TAKE 1 CAPSULE BY MOUTH EVERY DAY, Disp: 90 capsule, Rfl: 3    pravastatin (PRAVACHOL) 10 MG tablet, TAKE 1 TABLET BY MOUTH EVERY DAY, Disp: 90 tablet, Rfl: 3    sulfamethoxazole-trimethoprim 800-160mg (BACTRIM DS) 800-160 mg Tab, TAKE 1 TABLET BY MOUTH TWICE DAILY FOR 7 DAYS, Disp: 14 tablet, Rfl: 0    triamcinolone acetonide 0.1% (KENALOG) 0.1 % cream, Apply topically 2 (two) times daily., Disp: 45 g, Rfl: 0    TRIUMEQ 600- mg Tab, TAKE 1 TABLET BY MOUTH EVERY DAY, Disp: 90 tablet, Rfl: 3    valACYclovir (VALTREX) 1000 MG tablet, TAKE 1 TABLET BY MOUTH TWICE A DAY FOR 7 DAYS, Disp: 14 tablet, Rfl: 0    vitamin E 100 UNIT capsule, Take 100 Units by mouth once daily., Disp: , Rfl:     Review of Systems   Constitutional: Negative for activity change, chills, diaphoresis, fatigue and fever.   HENT: Negative for ear pain, mouth sores, nosebleeds and trouble swallowing.    Eyes: Negative for pain and visual disturbance.   Respiratory: Negative for cough, chest tightness and shortness of breath.    Cardiovascular: Negative for chest pain, palpitations and leg swelling.   Gastrointestinal: Negative for abdominal distention, abdominal pain and blood in stool.   Endocrine: Negative for polydipsia, polyphagia and polyuria.   Genitourinary: Negative for flank pain, frequency and genital sores.   Musculoskeletal: Negative for gait problem, joint swelling, neck pain and neck stiffness.   Skin: Negative for color change, pallor and rash.   Neurological: Negative for dizziness, syncope, light-headedness and headaches.   Hematological: Negative for adenopathy. Does not bruise/bleed easily.   Psychiatric/Behavioral: Negative for confusion, dysphoric mood, hallucinations, self-injury, sleep disturbance and suicidal ideas. The patient is not nervous/anxious and is not hyperactive.            Objective:      Vitals:    04/26/22 1444   BP: 132/78    Pulse: (!) 50     Physical Exam        Assessment/Plan:       1. HSV-2 seropositive    2. Secondary syphilis    3. Human immunodeficiency virus (HIV) disease      Problem List Items Addressed This Visit        ID    Human immunodeficiency virus (HIV) disease    Current Assessment & Plan     Currently on Triumeq. 100% compliant. No side effects. Will get labs updated today. He will f/u in clinic in 2 weeks to discuss. Continue meds.      January 2020 labs as follows:     CD4 893  RPR nonreactive  Macrocytic anemia but otherwise unremarkable  CMP WNL  HIV-1 RNA not detected.         February 2022 Labs =      HIV-1 RNA, Quantitative, Real-Time PCR IG    HIV-1 RNA, QN PCR IG<20 DETECTED NOT DETECTED copies/mL    HIV-1 RNA, QN PCR IG<1.30 DETECTED NOT DETECTED Log copies/mL        Reportable Range: 20 copies/mL to 10,000,000 copies/mL  (1.30 log copies/mL to 7.00 log copies/mL).  LYMPHOCYTE SUBSET PANEL 4 IG    % CD4 (HELPER CELLS) IG34 30-61 %    Absolute CD4+ Cells  490-1740 cells/uL    %CD8 SUPPRESSOR T CELL IG49 H 12-42 %    Absolute CD8+ Cells  180-1170 cells/uL    CD4/CD8 Ratio IG0.70 L 0.86-5.00    Absolute Lymphocytes QH6137 850-3900 cells/uL        PLAN  1. We discussed labs from February.   2. Recommend repeat testing in August.            Secondary syphilis    Current Assessment & Plan     RPR 1:8. Showed the expected decline. Repeat level in August.               Immunology/Multi System    HSV-2 seropositive - Primary    Current Assessment & Plan     No need for suppressive therapy at this time. Advised he would need > 4-5 outbreaks per year to go that route.                 Patient Message on 04/11/2022   Component Date Value Ref Range Status    HSV 1 IgG 04/11/2022 REACTIVE (A) NONREACTIVE Final    Comment: The methodology for this assay is electrochemiluminescence immunoassay  (ECLIA) effective 12/10/2018. This assay performance characteristics  have not been established in patients under the  age of 18 or in  populations of immunocompromised or immunosuppressed patients.      HSV 2 IgG 04/11/2022 REACTIVE (A) NONREACTIVE Final    Comment: The methodology for this assay is electrochemiluminescence  immunoassay (ECLIA) effective 12/10/2018. This assay performance  characteristics have not been established in patients under the age of  18 or in populations of immunocompromised or immunosuppressed  patients.  NOTE  Testing performed at:  [facility], [address] [place] [address] CLIA #:[id number]      HSV 1 Igm, IFA 04/11/2022 NEGATIVE   Final    HSV 2 IgM, IFA 04/11/2022 NEGATIVE   Final    Comment: REFERENCE RANGE: NEGATIVE    The IFA procedure for measuring IgM antibodies to HSV 1  and HSV 2 detects both type-common and type- specific  HSV antibodies. Thus, IgM reactivity to both HSV 1  and HSV 2 may represent crossreactive HSV antibodies  rather than exposure to both HSV 1 and HSV 2.    This test was developed and its analytical performance  characteristics have been determined by [employer].  It has not been cleared or approved by FDA. This assay has  been validated pursuant to the CLIA regulations and is  used for clinical purposes.        HSV 2 IGM TITER 04/11/2022    Final    Comment: NOTE  Testing performed at:  [facility], [id number] [place] [address] CLIA #: [id number]     Patient Message on 04/11/2022   Component Date Value Ref Range Status    WBC 04/11/2022 8.54  4.3 - 10.8 X 10 3/ul Final    RBC 04/11/2022 4.44 (A) 4.7 - 6.1 X 10 6/ul Final    RDW-SD 04/11/2022 44.8  37 - 54 fl Final    Hemoglobin 04/11/2022 14.1  14 - 18 g/dL Final    Hematocrit 04/11/2022 41.5 (A) 42 - 52 % Final    MCV 04/11/2022 93.5  80 - 94 fl Final    MCH 04/11/2022 31.8  27 - 32 pg Final    MCHC 04/11/2022 34.0  32 - 36 g/dL Final    Platelets 04/11/2022 194  135 - 400 X 10 3/ul Final    Neutrophils 04/11/2022 65.3  34 - 71.1 % Final    Lymphocytes  04/11/2022 28.1  19.3 - 53.1 % Final    Monocytes 04/11/2022 5.4  4.7 - 12.5 % Final    Eosinophils 04/11/2022 0.6 (A) 0.7 - 7.0 % Final    Basophils 04/11/2022 0.2  0.2 - 1.2 % Final    Neutrophils Absolute 04/11/2022 5.58  2.15 - 7.56 X 10 3/ul Final    Lymphocytes Absolute 04/11/2022 2.40  0.86 - 4.75 X 10 3/ul Final    Monocytes Absolute 04/11/2022 0.46  0.22 - 1.08 X 10 3/ul Final    Eosinophils Absolute 04/11/2022 0.05  0.04 - 0.54 X 10 3/ul Final    Basophils Absolute 04/11/2022 0.02  0.00 - 0.22 X 10 3/ul Final    Immature Granulocytes Absolute 04/11/2022 0.03  0 - 0.04 X 10 3/ul Final    Immature Granulocytes 04/11/2022 0.4  0 - 0.5 % Final    IG includes metamyelocytes, myelocytes, and promyelocytes    nRBC# 04/11/2022 0.0  0 - 0.2 /100 WBC Final    nRBC Count Absolute 04/11/2022 0.000  0 - 0.012 x 10 3/ul Final    Comment: NOTE  Testing performed at:  [facility], [address] [place] [address] CLIA #:[id number]      Glucose 04/11/2022 104  74 - 106 mg/dL Final    BUN 04/11/2022 18.6  6 - 20 mg/dL Final    Creatinine 04/11/2022 1.35 (A) 0.70 - 1.20 mg/dL Final    Recommend repeat creatinine within 90 days.    AST 04/11/2022 17  0 - 40 U/L Final    ALT (SGPT) 04/11/2022 18  0 - 41 U/L Final    Alkaline Phosphatase 04/11/2022 84  40 - 130 U/L Final    Calcium 04/11/2022 9.7  8.6 - 10.2 mg/dL Final    Protein, Total 04/11/2022 7.5  6.4 - 8.3 g/dL Final    Albumin 04/11/2022 4.9  3.5 - 5.2 g/dL Final    BILIRUBIN, TOTAL 04/11/2022 0.42  0.00 - 1.20 mg/dL Final    Sodium 04/11/2022 140  136 - 145 mmol/L Final    Potassium 04/11/2022 3.7  3.5 - 5.1 mmol/L Final    Chloride 04/11/2022 102  98 - 107 mmol/L Final    CO2 04/11/2022 27  22 - 29 mmol/L Final    Globulin 04/11/2022 2.6  1.5 - 4.5 g/dL Final    Albumin/Globulin Ratio 04/11/2022 1.9  1.0 - 2.7 Final    BUN/Creatinine Ratio 04/11/2022 13.8  6 - 20 Final    GFR ESTIMATION 04/11/2022 56.41 (A) >60.00 Final  "   Comment: GFR estimation is reported as mL/min/1.73m squared.  It is recommended  that GFR values for  be multiplied x 1.212.  The  higher the GFR, the better the kidney function.  A GFR of >60 is  considered normal (depending on age and whether the patient is a male  or female.      Anion Gap 04/11/2022 11.0  8.0 - 17.0 mmol/L Final    Comment: NOTE  Testing performed at:  [facility], [address] [place] [address] CLIA #:[id number]      Syphilis Treponemal Ab 04/11/2022 REACTIVE (A) NONREACTIVE Final    Comment: Reactive treponemal test results alone are not diagnostic of  syphilis.  Treponemal test results should always be interpreted in conjunction  with additional treponemal or nontreponemal serologic test results,  medical history, and clinical presentation as recommended by the CDC.  Therefore an RPR has been added to this sample to aid in the  diagnosis.  The electrochemiluminescence immunoassay "ECLIA" methodology is  effective 12/10/2018.  This immunoassay is for the in vitro detection  of total antibodies (IgG and IgM) to Treponema pallidum in human  serum.  NOTE  Testing performed at:  [facility], [address] [place] [address]  [address] CLIA #:[id number]      Source 04/11/2022 URINE   Final    Chlamydia 04/11/2022 NEGATIVE  NEGATIVE Final    Comment: Testing performed using GenProbe APTIMA COMBO 2 Assay which utilizes  a target amplification nucleic acid probe.      Gonorrhea 04/11/2022 NEGATIVE  NEGATIVE Final    Comment: Testing performed using GenProbe APTIMA COMBO 2 Assay which utilizes  a target amplification nucleic acid probe.  NOTE  Testing performed at:  [facility], [address] [place] [address]  [address] CLIA #:[id number]      Source 04/11/2022 URINE   Final    TRICHOMONAS AMPLIFIED 04/11/2022 NEGATIVE  NEGATIVE Final    Comment: Testing performed using the Aptima Trichomonas Vaginalis Assay which  is a qualitative nucleic " acid amplification test(NAAT) for the  detection of ribosomal RNA (rRNA).  NOTE  Testing performed at:  [facility], [address] [place] [address]  [address] CLIA #:[id number]      Color, UA 04/11/2022 YELLOW   Final    Clarity, UA 04/11/2022 CLEAR   Final    Specific Gravity,UA 04/11/2022 1.025  1.005 - 1.030 Final    pH, Urine 04/11/2022 6  5 - 7.5 Final    Leukocytes, UA 04/11/2022 NEGATIVE  NEGATIVE Final    Nitrite, Urine 04/11/2022 NEGATIVE  NEGATIVE Final    Protein, UA 04/11/2022 NEGATIVE  NEGATIVE mg/dL Final    Glucose, UA 04/11/2022 NEGATIVE  NEGATIVE mg/dL Final    Ketones, UA 04/11/2022 NEGATIVE  NEGATIVE mg/dL Final    Urobilinogen, urine 04/11/2022 NORMAL  0 - 1.0 E.U./dL Final    Bilirubin (UA) 04/11/2022 NEGATIVE  NEGATIVE Final    Occult Blood 04/11/2022 NEGATIVE  NEGATIVE Final    WBC/HPF 04/11/2022 NEGATIVE  <5 Final    RBC/HPF 04/11/2022 NEGATIVE  <5 Final    Amorphous, UA 04/11/2022 TRACE   Final    Bacteria, UA 04/11/2022 NEGATIVE  NEG-TRACE Final    Epithelial Cells 04/11/2022 FEW  NEGATIVE-FEW Final    Mucus, UA 04/11/2022 1+ (A) NEGATIVE Final    SPERMATAZOA 04/11/2022 0-2 PER HPF (A) NEGATIVE Final    Comment: NOTE  Testing performed at:  [facility], [address] [place] [address]  [address] CLIA #:[id number]      Additional Testing 04/11/2022 SEE BELOW   Final    Comment: Additional testing was collected and sent to a reference lab. Results  may take 7 days to 2 weeks before they are final. Reports will be sent  to ordering client via fax, pathviewer, mail, interface or   delivered.  NOTE  Testing performed at:  [facility], [address] [place] [address]  [address] CLIA #:[id number]      RPR Reflex 04/11/2022 REACTIVE (A) NON REACTIVE Final    Comment: Consistent with current or past syphilis infections.  This is being performed as a reflex for a reactive treponemal Ab test.  NOTE  Testing performed at:  [facility], [address]  [place]  [address] CLIA #:[id number]      RPR Titer 04/11/2022 1:8 (A) NONREACTIVE Final    Comment: NOTE  Testing performed at:  [facility], [address] [place] [address], [place]  [address] CLIA #:[id number]      Hepatitis A IgM 04/11/2022 NONREACTIVE  NONREACTIVE Final    Comment: NOTE  Testing performed at:  [facility], [address] [place] [address], [place]  [address] CLIA #:[id number]      Hepatitis B Surface Ag 04/11/2022 NONREACTIVE  NONREACTIVE Final    Comment: NOTE  Testing performed at:  [facility], [address] [place] [address], [place]  [address] CLIA #:[id number]      Hep B C IgM 04/11/2022 NONREACTIVE  NONREACTIVE Final    Comment: NOTE  Testing performed at:  [facility], [address] [place] [address]  [address] CLIA #:[id number]      HCV Ab 04/11/2022 NONREACTIVE  NONREACTIVE Final    Comment: NOTE  Testing performed at:  [facility], [address] [place] [address], [place]  [address] CLIA #:[id number]        No results found in the last 30 days.      Duration of encounter: 30 minutes  This includes face-to-face time and non face-to-face time preparing to see the patient (eg, review of tests), obtaining and/or reviewing separately obtained history, documenting clinical information in the electronic or other health record, independently interpreting resultsand communicating results to the patient/family/caregiver, or care coordination.      All diagnostic data (labs/imaging) was reviewed with the patient and/or family member in the room.  All questions were answered to their liking. The patient and/or family member voiced understanding of all instructions provided. Expectations regarding follow up and treatment plan were voiced and confirmed prior to departure. The patient was given orders/instructions at the end of the visit for reference. They were instructed to notify my office if they have not been contacted for imaging/referrals/labs/results in  1-2 weeks. They voiced understanding of all of the above.     Follow up:     There are no Patient Instructions on file for this visit.     No follow-ups on file.

## 2022-05-19 RX ORDER — PRAVASTATIN SODIUM 10 MG/1
10 TABLET ORAL DAILY
Qty: 90 TABLET | Refills: 3 | Status: SHIPPED | OUTPATIENT
Start: 2022-05-19 | End: 2023-05-11 | Stop reason: SDUPTHER

## 2022-06-01 ENCOUNTER — PATIENT MESSAGE (OUTPATIENT)
Dept: INFECTIOUS DISEASES | Facility: CLINIC | Age: 47
End: 2022-06-01
Payer: COMMERCIAL

## 2022-06-01 DIAGNOSIS — U07.1 COVID-19: Primary | ICD-10-CM

## 2022-06-03 ENCOUNTER — PATIENT MESSAGE (OUTPATIENT)
Dept: INFECTIOUS DISEASES | Facility: CLINIC | Age: 47
End: 2022-06-03
Payer: COMMERCIAL

## 2022-07-11 ENCOUNTER — OFFICE VISIT (OUTPATIENT)
Dept: FAMILY MEDICINE | Facility: CLINIC | Age: 47
End: 2022-07-11
Payer: COMMERCIAL

## 2022-07-11 VITALS — DIASTOLIC BLOOD PRESSURE: 77 MMHG | SYSTOLIC BLOOD PRESSURE: 123 MMHG

## 2022-07-11 DIAGNOSIS — B20 HUMAN IMMUNODEFICIENCY VIRUS (HIV) DISEASE: ICD-10-CM

## 2022-07-11 DIAGNOSIS — L02.11 ABSCESS OF SKIN OF NECK: Primary | ICD-10-CM

## 2022-07-11 PROCEDURE — 3074F SYST BP LT 130 MM HG: CPT | Mod: CPTII,S$GLB,, | Performed by: NURSE PRACTITIONER

## 2022-07-11 PROCEDURE — 99213 OFFICE O/P EST LOW 20 MIN: CPT | Mod: S$GLB,,, | Performed by: NURSE PRACTITIONER

## 2022-07-11 PROCEDURE — 99213 PR OFFICE/OUTPT VISIT, EST, LEVL III, 20-29 MIN: ICD-10-PCS | Mod: S$GLB,,, | Performed by: NURSE PRACTITIONER

## 2022-07-11 PROCEDURE — 3078F DIAST BP <80 MM HG: CPT | Mod: CPTII,S$GLB,, | Performed by: NURSE PRACTITIONER

## 2022-07-11 PROCEDURE — 1159F PR MEDICATION LIST DOCUMENTED IN MEDICAL RECORD: ICD-10-PCS | Mod: CPTII,S$GLB,, | Performed by: NURSE PRACTITIONER

## 2022-07-11 PROCEDURE — 1159F MED LIST DOCD IN RCRD: CPT | Mod: CPTII,S$GLB,, | Performed by: NURSE PRACTITIONER

## 2022-07-11 PROCEDURE — 3074F PR MOST RECENT SYSTOLIC BLOOD PRESSURE < 130 MM HG: ICD-10-PCS | Mod: CPTII,S$GLB,, | Performed by: NURSE PRACTITIONER

## 2022-07-11 PROCEDURE — 3078F PR MOST RECENT DIASTOLIC BLOOD PRESSURE < 80 MM HG: ICD-10-PCS | Mod: CPTII,S$GLB,, | Performed by: NURSE PRACTITIONER

## 2022-07-11 NOTE — PROGRESS NOTES
Subjective:      Patient ID: Carl Lorenzo is a 46 y.o. male.    Chief Complaint: Abscess (Rt neck, left lower leg)      Carl Lorenzo is a 44 y.o. male    Hx of HIV, + RPR    Here today with complaints of abscess to right neck. Onset 2-3 days ago.  + erythema, warmth, throbbing. No drainage. There is a central scab from him picking the area.  Denies dental pain, abscess, sore throat, hoarseness, difficulty swallowing, drooling. Denies fever, chills, body aches. Denies preseptal/periorbital involvement. He states he has minocycline at home but has not started it yet. This is a recurrent issue for him. No visual disturbance.      No other issues reported at this time.       Past Medical History:   Diagnosis Date    GERD (gastroesophageal reflux disease)     HIV (human immunodeficiency virus infection) 01/01/2004    Hypertension       Social History     Socioeconomic History    Marital status:    Tobacco Use    Smoking status: Never Smoker    Smokeless tobacco: Never Used   Substance and Sexual Activity    Alcohol use: Yes      Family History   Problem Relation Age of Onset    Thyroid disease Mother     Diabetes Mother     Diabetes Father         ROS:   Review of Systems   Constitutional: Negative for activity change, chills, diaphoresis, fatigue and fever.   HENT: Negative for dental problem, ear pain, facial swelling, mouth sores, nosebleeds, sinus pain, sore throat, trouble swallowing and voice change.    Eyes: Negative for pain and visual disturbance.   Respiratory: Negative for cough, chest tightness and shortness of breath.    Cardiovascular: Negative for chest pain, palpitations and leg swelling.   Gastrointestinal: Negative for abdominal distention, abdominal pain and blood in stool.   Endocrine: Negative for polydipsia, polyphagia and polyuria.   Genitourinary: Negative for flank pain and frequency.   Musculoskeletal: Negative for gait problem, joint swelling, neck pain and neck  stiffness.   Skin: Positive for wound. Negative for color change, pallor and rash.   Neurological: Negative for dizziness, syncope, light-headedness and headaches.   Hematological: Negative for adenopathy. Does not bruise/bleed easily.   Psychiatric/Behavioral: Negative for confusion, dysphoric mood, hallucinations, self-injury, sleep disturbance and suicidal ideas. The patient is not nervous/anxious and is not hyperactive.      Objective:   Physical Exam  Vitals and nursing note reviewed.   Constitutional:       General: He is not in acute distress.     Appearance: Normal appearance. He is not ill-appearing.   Neck:     Musculoskeletal:      Cervical back: Full passive range of motion without pain.   Lymphadenopathy:      Cervical: No cervical adenopathy.   Neurological:      Mental Status: He is alert.       Assessment:     1. Abscess of skin of neck    2. Human immunodeficiency virus (HIV) disease      No images are attached to the encounter.   Plan:     Problem List Items Addressed This Visit        ID    Human immunodeficiency virus (HIV) disease      Other Visit Diagnoses     Abscess of skin of neck    -  Primary    Start Minocycline. 1/2 cc instilled into abscess. No fluid aspirated despite 12 G needle attempt. Advised warm compress. RTC by thursday if not better.        Procedures     No visits with results within 1 Month(s) from this visit.   Latest known visit with results is:   Patient Message on 04/11/2022   Component Date Value Ref Range Status    HSV 1 IgG 04/11/2022 REACTIVE (A) NONREACTIVE Final    Comment: The methodology for this assay is electrochemiluminescence immunoassay  (ECLIA) effective 12/10/2018. This assay performance characteristics  have not been established in patients under the age of 18 or in  populations of immunocompromised or immunosuppressed patients.      HSV 2 IgG 04/11/2022 REACTIVE (A) NONREACTIVE Final    Comment: The methodology for this assay is  electrochemiluminescence  immunoassay (ECLIA) effective 12/10/2018. This assay performance  characteristics have not been established in patients under the age of  18 or in populations of immunocompromised or immunosuppressed  patients.  NOTE  Testing performed at:  [facility], [address] [place] [address]  [address] CLIA #:[id number]      HSV 1 Igm, IFA 04/11/2022 NEGATIVE   Final    HSV 2 IgM, IFA 04/11/2022 NEGATIVE   Final    Comment: REFERENCE RANGE: NEGATIVE    The IFA procedure for measuring IgM antibodies to HSV 1  and HSV 2 detects both type-common and type- specific  HSV antibodies. Thus, IgM reactivity to both HSV 1  and HSV 2 may represent crossreactive HSV antibodies  rather than exposure to both HSV 1 and HSV 2.    This test was developed and its analytical performance  characteristics have been determined by "[employer]".  It has not been cleared or approved by FDA. This assay has  been validated pursuant to the CLIA regulations and is  used for clinical purposes.        HSV 2 IGM TITER 04/11/2022    Final    Comment: NOTE  Testing performed at:  [facility], [address] [place] [id number] CLIA #: [id number]          No results found in the last 30 days.     All diagnostic data (labs/imaging) was reviewed with the patient and/or family member in the room.  All questions were answered to their liking. The patient and/or family member voiced understanding of all instructions provided. Expectations regarding follow up and treatment plan were voiced and confirmed prior to departure. The patient was given orders/instructions at the end of the visit for reference. They were instructed to notify my office if they have not been contacted for imaging/referrals/labs/results in 1-2 weeks. They voiced understanding of all of the above.     Follow up:     There are no Patient Instructions on file for this visit.     Follow up in about 3 weeks (around 8/1/2022), or if  symptoms worsen or fail to improve.

## 2022-07-19 DIAGNOSIS — R76.8 HSV-2 SEROPOSITIVE: ICD-10-CM

## 2022-07-19 RX ORDER — VALACYCLOVIR HYDROCHLORIDE 1 G/1
TABLET, FILM COATED ORAL
Qty: 14 TABLET | Refills: 0 | Status: SHIPPED | OUTPATIENT
Start: 2022-07-19 | End: 2022-08-04

## 2022-07-19 RX ORDER — MINOCYCLINE HYDROCHLORIDE 100 MG/1
CAPSULE ORAL
Qty: 14 CAPSULE | Refills: 0 | OUTPATIENT
Start: 2022-07-19

## 2022-07-19 RX ORDER — SULFAMETHOXAZOLE AND TRIMETHOPRIM 800; 160 MG/1; MG/1
TABLET ORAL
Qty: 14 TABLET | Refills: 0 | Status: SHIPPED | OUTPATIENT
Start: 2022-07-19 | End: 2022-08-04

## 2022-08-31 ENCOUNTER — PATIENT MESSAGE (OUTPATIENT)
Dept: FAMILY MEDICINE | Facility: CLINIC | Age: 47
End: 2022-08-31
Payer: COMMERCIAL

## 2022-08-31 ENCOUNTER — TELEPHONE (OUTPATIENT)
Dept: FAMILY MEDICINE | Facility: CLINIC | Age: 47
End: 2022-08-31
Payer: COMMERCIAL

## 2022-09-06 ENCOUNTER — PATIENT MESSAGE (OUTPATIENT)
Dept: INFECTIOUS DISEASES | Facility: CLINIC | Age: 47
End: 2022-09-06
Payer: COMMERCIAL

## 2022-09-06 DIAGNOSIS — B20 HUMAN IMMUNODEFICIENCY VIRUS (HIV) DISEASE: Primary | ICD-10-CM

## 2022-09-06 DIAGNOSIS — L08.9 RECURRENT INFECTION OF SKIN: ICD-10-CM

## 2022-09-06 DIAGNOSIS — R76.8 HSV-2 SEROPOSITIVE: ICD-10-CM

## 2022-09-06 DIAGNOSIS — J32.0 CHRONIC MAXILLARY SINUSITIS: ICD-10-CM

## 2022-09-13 ENCOUNTER — PATIENT MESSAGE (OUTPATIENT)
Dept: INFECTIOUS DISEASES | Facility: CLINIC | Age: 47
End: 2022-09-13
Payer: COMMERCIAL

## 2022-09-13 RX ORDER — MINOCYCLINE HYDROCHLORIDE 100 MG/1
100 CAPSULE ORAL EVERY 12 HOURS
Qty: 14 CAPSULE | Refills: 0 | Status: SHIPPED | OUTPATIENT
Start: 2022-09-13 | End: 2022-09-20

## 2022-09-20 ENCOUNTER — OFFICE VISIT (OUTPATIENT)
Dept: INFECTIOUS DISEASES | Facility: CLINIC | Age: 47
End: 2022-09-20
Payer: COMMERCIAL

## 2022-09-20 VITALS — OXYGEN SATURATION: 98 % | DIASTOLIC BLOOD PRESSURE: 88 MMHG | HEART RATE: 86 BPM | SYSTOLIC BLOOD PRESSURE: 139 MMHG

## 2022-09-20 DIAGNOSIS — A51.49 SECONDARY SYPHILIS: ICD-10-CM

## 2022-09-20 DIAGNOSIS — B20 HUMAN IMMUNODEFICIENCY VIRUS (HIV) DISEASE: Primary | ICD-10-CM

## 2022-09-20 DIAGNOSIS — R76.8 HSV-2 SEROPOSITIVE: ICD-10-CM

## 2022-09-20 DIAGNOSIS — H60.01 ABSCESS OF EXTERNAL EAR, RIGHT: ICD-10-CM

## 2022-09-20 DIAGNOSIS — L03.90 RECURRENT CELLULITIS: ICD-10-CM

## 2022-09-20 DIAGNOSIS — Z71.2 ENCOUNTER TO DISCUSS TEST RESULTS: ICD-10-CM

## 2022-09-20 PROCEDURE — 99214 OFFICE O/P EST MOD 30 MIN: CPT | Mod: S$GLB,,, | Performed by: NURSE PRACTITIONER

## 2022-09-20 PROCEDURE — 1159F PR MEDICATION LIST DOCUMENTED IN MEDICAL RECORD: ICD-10-PCS | Mod: CPTII,S$GLB,, | Performed by: NURSE PRACTITIONER

## 2022-09-20 PROCEDURE — 3079F PR MOST RECENT DIASTOLIC BLOOD PRESSURE 80-89 MM HG: ICD-10-PCS | Mod: CPTII,S$GLB,, | Performed by: NURSE PRACTITIONER

## 2022-09-20 PROCEDURE — 99214 PR OFFICE/OUTPT VISIT, EST, LEVL IV, 30-39 MIN: ICD-10-PCS | Mod: S$GLB,,, | Performed by: NURSE PRACTITIONER

## 2022-09-20 PROCEDURE — 3075F SYST BP GE 130 - 139MM HG: CPT | Mod: CPTII,S$GLB,, | Performed by: NURSE PRACTITIONER

## 2022-09-20 PROCEDURE — 3075F PR MOST RECENT SYSTOLIC BLOOD PRESS GE 130-139MM HG: ICD-10-PCS | Mod: CPTII,S$GLB,, | Performed by: NURSE PRACTITIONER

## 2022-09-20 PROCEDURE — 3079F DIAST BP 80-89 MM HG: CPT | Mod: CPTII,S$GLB,, | Performed by: NURSE PRACTITIONER

## 2022-09-20 PROCEDURE — 1159F MED LIST DOCD IN RCRD: CPT | Mod: CPTII,S$GLB,, | Performed by: NURSE PRACTITIONER

## 2022-09-20 RX ORDER — MUPIROCIN 20 MG/G
OINTMENT TOPICAL DAILY
Qty: 22 G | Refills: 5 | Status: SHIPPED | OUTPATIENT
Start: 2022-09-20 | End: 2022-10-20

## 2022-09-20 RX ORDER — VALACYCLOVIR HYDROCHLORIDE 500 MG/1
500 TABLET, FILM COATED ORAL 2 TIMES DAILY
Qty: 60 TABLET | Refills: 11 | Status: SHIPPED | OUTPATIENT
Start: 2022-09-20 | End: 2023-10-10

## 2022-09-20 NOTE — ASSESSMENT & PLAN NOTE
Has been controlled up to this point. Recent workup unremarkable for immunodeficiency outside of his HIV.        PLAN    1. Decolonization for recurrent MRSA - See AVS handout.   2. Start Valtrex 500mg bid for HSV2 suppression ( >5 outbreaks this year).   3. F/u w/ Dr. Billy as scheduled.

## 2022-09-20 NOTE — PROGRESS NOTES
Infectious Diseases Clinic Note    Subjective:       Patient ID: Carl Lorenzo is a 46 y.o. male     Chief Complaint: Follow-up        HPI           Past Medical History:   Diagnosis Date    GERD (gastroesophageal reflux disease)     HIV (human immunodeficiency virus infection) 01/01/2004    Hypertension        Social History     Socioeconomic History    Marital status:    Tobacco Use    Smoking status: Never    Smokeless tobacco: Never   Substance and Sexual Activity    Alcohol use: Yes         Current Outpatient Medications:     abacavir-dolutegravir-lamivud (TRIUMEQ) 600- mg Tab, Take 1 tablet by mouth once daily., Disp: 90 tablet, Rfl: 3    ascorbic acid, vitamin C, (VITAMIN C) 100 MG tablet, Take 100 mg by mouth once daily., Disp: , Rfl:     b complex vitamins capsule, Take 1 capsule by mouth once daily., Disp: , Rfl:     LORazepam (ATIVAN) 0.5 MG tablet, Take 1 tablet (0.5 mg total) by mouth once daily., Disp: 30 tablet, Rfl: 1    minocycline (MINOCIN,DYNACIN) 100 MG capsule, Take 1 capsule (100 mg total) by mouth every 12 (twelve) hours. for 7 days, Disp: 14 capsule, Rfl: 0    mupirocin (BACTROBAN) 2 % ointment, by Nasal route once daily., Disp: 22 g, Rfl: 5    omeprazole (PRILOSEC) 40 MG capsule, TAKE 1 CAPSULE BY MOUTH EVERY DAY, Disp: 90 capsule, Rfl: 3    pravastatin (PRAVACHOL) 10 MG tablet, Take 1 tablet (10 mg total) by mouth once daily., Disp: 90 tablet, Rfl: 3    sulfamethoxazole-trimethoprim 800-160mg (BACTRIM DS) 800-160 mg Tab, TAKE 1 TABLET BY MOUTH TWICE A DAY FOR 7 DAYS, Disp: 14 tablet, Rfl: 0    triamcinolone acetonide 0.1% (KENALOG) 0.1 % cream, APPLY TOPICALLY TWICE A DAY, Disp: 45 g, Rfl: 0    TRIUMEQ 600- mg Tab, TAKE 1 TABLET BY MOUTH EVERY DAY, Disp: 90 tablet, Rfl: 3    valACYclovir (VALTREX) 500 MG tablet, Take 1 tablet (500 mg total) by mouth 2 (two) times daily., Disp: 60 tablet, Rfl: 11    vitamin E 100 UNIT capsule, Take 100 Units by mouth once daily.,  Disp: , Rfl:     Review of Systems        Objective:      Vitals:    09/20/22 1452   BP: 139/88   Pulse: 86     Physical Exam        Assessment/Plan:       1. Human immunodeficiency virus (HIV) disease    2. HSV-2 seropositive    3. Recurrent cellulitis    4. Secondary syphilis      Problem List Items Addressed This Visit          ID    Human immunodeficiency virus (HIV) disease - Primary    Relevant Orders    Culture, MRSA    Secondary syphilis    Current Assessment & Plan         RPR TITER 1:4 - previously 1:8          Relevant Orders    Culture, MRSA       Immunology/Multi System    HSV-2 seropositive    Current Assessment & Plan     Needs suppressive therapy. Recommend Valtrex 500 mg bid.          Relevant Medications    valACYclovir (VALTREX) 500 MG tablet    Other Relevant Orders    Culture, MRSA     Other Visit Diagnoses       Recurrent cellulitis        Relevant Medications    mupirocin (BACTROBAN) 2 % ointment    Other Relevant Orders    Culture, MRSA           No visits with results within 1 Month(s) from this visit.   Latest known visit with results is:   Patient Message on 04/11/2022   Component Date Value Ref Range Status    HSV 1 IgG 04/11/2022 REACTIVE (A)  NONREACTIVE Final    Comment: The methodology for this assay is electrochemiluminescence immunoassay  (ECLIA) effective 12/10/2018. This assay performance characteristics  have not been established in patients under the age of 18 or in  populations of immunocompromised or immunosuppressed patients.      HSV 2 IgG 04/11/2022 REACTIVE (A)  NONREACTIVE Final    Comment: The methodology for this assay is electrochemiluminescence  immunoassay (ECLIA) effective 12/10/2018. This assay performance  characteristics have not been established in patients under the age of  18 or in populations of immunocompromised or immunosuppressed  patients.  NOTE  Testing performed at:  [facility], [address] [place] [address]  [id number] CLIA #:[id number]       HSV 1 Igm, IFA 04/11/2022 NEGATIVE   Final    HSV 2 IgM, IFA 04/11/2022 NEGATIVE   Final    Comment: REFERENCE RANGE: NEGATIVE    The IFA procedure for measuring IgM antibodies to HSV 1  and HSV 2 detects both type-common and type- specific  HSV antibodies. Thus, IgM reactivity to both HSV 1  and HSV 2 may represent crossreactive HSV antibodies  rather than exposure to both HSV 1 and HSV 2.    This test was developed and its analytical performance  characteristics have been determined by [employer].  It has not been cleared or approved by FDA. This assay has  been validated pursuant to the CLIA regulations and is  used for clinical purposes.        HSV 2 IGM TITER 04/11/2022    Final    Comment: NOTE  Testing performed at:  [facility], [address] [place] [id number] CLIA #: [id number]        No results found in the last 30 days.      Duration of encounter: minutes  This includes face-to-face time and non face-to-face time preparing to see the patient (eg, review of tests), obtaining and/or reviewing separately obtained history, documenting clinical information in the electronic or other health record, independently interpreting resultsand communicating results to the patient/family/caregiver, or care coordination.      All diagnostic data (labs/imaging) was reviewed with the patient and/or family member in the room.  All questions were answered to their liking. The patient and/or family member voiced understanding of all instructions provided. Expectations regarding follow up and treatment plan were voiced and confirmed prior to departure. The patient was given orders/instructions at the end of the visit for reference. They were instructed to notify my office if they have not been contacted for imaging/referrals/labs/results in 1-2 weeks. They voiced understanding of all of the above.     Follow up:     Patient Instructions   MRSA EDUCATION       What are symptoms of MRSA Infection?  "  The symptoms of a MRSA infection depend on the part of the body that is infected. For example, people with MRSA skin infections often can get swelling, warmth, redness, and pain in infected skin. In most cases it is hard to tell if an infection is due to MRSA or another type of bacteria without laboratory tests that your doctor can order. Some MRSA skin infections can have a fairly typical appearance and can be confused with a spider bite. However, unless you actually see the spider, the irritation is likely not a spider bite.  Most S. aureus skin infections, including MRSA, appear as a bump or infected area on the skin that might be:  red   swollen   painful   warm to the touch   full of pus or other drainage   accompanied by a fever    HOW IS MRSA SPREAD?  You can be "colonized" with MRSA, meaning that you carry the bacteria on your skin or in your nose but you have no signs or symptoms of the illness. You can become colonized with MRSA in a variety of ways:    ?By touching the skin of another person who is colonized with MRSA  ?By touching a contaminated surface (such as a countertop, door handle, or phone)    MRSA RISK FACTORS  Anyone can become colonized and then infected with MRSA, although certain people are at a higher risk.    Hospital care -- Risk factors for becoming infected with hospital-associated MRSA include the following:    ?Having a surgical wound and/or intravenous (IV) line  ?Being hospitalized for a prolonged period of time  ?Recent use of antibiotics  ?Having a weakened immune system due to a medical condition or its treatment (eg, receiving medications that weaken the immune system)  ?Being in close proximity to other patients, family members, or health care workers who are colonized with MRSA    In hospitals and other long-term health care facilities, MRSA can be spread from one patient to another on the hands of health care workers. Hands may become contaminated with MRSA when health care " "workers touch a patient's skin, wounds, wound dressings, or devices, such as IV tubing.    You can develop an infection from MRSA if your skin is colonized and the bacteria enter an opening (eg, a cut, scrape, or wound) in the skin.    Community-associated MRSA -- You can  MRSA outside the hospital, especially if you:    ?Have skin trauma (eg, "turf burns," cuts, or sores)  ?Are an athlete  ?Shave or wax to remove body hair, particularly of the armpits and groin  ?Have tattoos or body piercing  ?Have physical contact with a person who has a draining cut or sore or is a carrier of MRSA  ?Share personal items or equipment that is not cleaned or laundered between users (such as towels or protective sport pads)    Community-associated MRSA infections may occur more commonly in certain populations, such as  centers, prisons, in the , or in athletes who play on a team. Spread of MRSA within households is common.    Prevention in the community -- The best way to prevent and control MRSA in the community is not clear. The [place] Centers for Disease Control and Prevention has made the following recommendations [3]    ?Keep hands clean by washing thoroughly with soap and water. Hands should be wet with water and plain soap and be rubbed together for 15 to 30 seconds. Special attention should be paid to the fingernails, between the fingers, and the wrists. Hands should be rinsed thoroughly and dried with a single-use towel (eg, paper towels).  ?Alcohol-based hand sanitizers are a good alternative for disinfecting hands if a sink is not available. Hand sanitizers should be rubbed over the entire surface of hands, fingers, and wrists until dry and may be used several times. Hand sanitizers are available as a liquid or wipe in small, portable sizes that are easy to carry in a pocket or handbag. When a sink is available, visibly soiled hands should be washed with soap and water.  ?Keep cuts and scrapes " clean, dry, and covered with a bandage until healed.  ?Avoid touching other people's wounds or bandages.  ?Avoid sharing personal items such as towels, washcloths, razors, clothing, or uniforms.  Other items that should not be shared include brushes, yanes, and makeup.  ?Students who participate in team sports should shower after every athletic activity using soap and clean towels. Athletes with skin infections should receive prompt treatment and should not compete when they have draining or active skin infections.  ?People who use exercise machines at sports clubs or schools should be sure to wipe down the equipment, including the hand , with an alcohol-based solution after using it.        DECOLONIZATION:      Nasal decolonization with mupirocin ointment (2%) applied to nares once daily for 1 month, and    ?Topical body decolonization (BOTH of the following):  Chlorhexidine gluconate (2% or 4% solution): daily washes and NONSCENTED DIAL SOAP  Dilute bleach baths (one teaspoon bleach per gallon of water, or one-fourth cup bleach per one-fourth tub [approximately 13 gallons of water] for 15 minutes once weekly) for approximately one months (only in the absence of skin sores/lesions/open wounds)            How can I clean and disinfect surfaces to prevent MRSA infection?      or detergents are products that remove soil, dirt, dust, organic matter, and germs (like bacteria, viruses, and fungi). They lift dirt and germs off surfaces so they can be rinsed away with water. Cleaning with a detergent is necessary to remove dirt that can prevent disinfectants from working. Some disinfectants have a cleaning agent mixed in, check the label to know which product you have.   Disinfectants are chemical products that are used to kill germs in healthcare settings. Disinfectants effective against Staphylococcus aureus, or staph, are also effective against MRSA. The disinfectant's label should have a list of germs that  the product can kill, along with an Environmental Protection Agency (EPA) registration number. These products are also sold at grocery and other retail stores and may be helpful when someone has an infected wound.    What should I clean to prevent MRSA from spreading?     When cleaning and disinfecting, focus on surfaces that frequently contact people's bare skin like desks, chairs, benches, gym equipment, lockers, faucets, light switches and remote controls. In particular, clean any surfaces that could come into contact with uncovered wounds, cuts, or boils. In addition to cleaning surfaces, frequently cleaning hands and keeping wounds covered keeps MRSA from spreading.  Large surfaces, such as floors and walls, have not been associated with the spread of staph and MRSA. There is no evidence that spraying or fogging rooms or surfaces with disinfectants will prevent MRSA infections more effectively than the targeted approach of cleaning frequently touched surfaces and surfaces that have been exposed to open wounds.    What if I see these symptoms?     You cannot tell by looking at the skin if it's a staph infection (including MRSA).  Getting medical care early makes it less likely that the infection will become serious.  If you or someone in your family experiences the signs and symptoms of MRSA:  Contact your healthcare provider, especially if the symptoms are accompanied by a fever.   Do not pick at or pop the sore.   Cover the area with clean, dry bandages until you can see a healthcare provider.   Clean your hands often.          Follow up if symptoms worsen or fail to improve.

## 2022-09-20 NOTE — PATIENT INSTRUCTIONS
"MRSA EDUCATION       What are symptoms of MRSA Infection?   The symptoms of a MRSA infection depend on the part of the body that is infected. For example, people with MRSA skin infections often can get swelling, warmth, redness, and pain in infected skin. In most cases it is hard to tell if an infection is due to MRSA or another type of bacteria without laboratory tests that your doctor can order. Some MRSA skin infections can have a fairly typical appearance and can be confused with a spider bite. However, unless you actually see the spider, the irritation is likely not a spider bite.  Most S. aureus skin infections, including MRSA, appear as a bump or infected area on the skin that might be:  red   swollen   painful   warm to the touch   full of pus or other drainage   accompanied by a fever    HOW IS MRSA SPREAD?  You can be "colonized" with MRSA, meaning that you carry the bacteria on your skin or in your nose but you have no signs or symptoms of the illness. You can become colonized with MRSA in a variety of ways:    ?By touching the skin of another person who is colonized with MRSA  ?By touching a contaminated surface (such as a countertop, door handle, or phone)    MRSA RISK FACTORS  Anyone can become colonized and then infected with MRSA, although certain people are at a higher risk.    Hospital care -- Risk factors for becoming infected with hospital-associated MRSA include the following:    ?Having a surgical wound and/or intravenous (IV) line  ?Being hospitalized for a prolonged period of time  ?Recent use of antibiotics  ?Having a weakened immune system due to a medical condition or its treatment (eg, receiving medications that weaken the immune system)  ?Being in close proximity to other patients, family members, or health care workers who are colonized with MRSA    In hospitals and other long-term health care facilities, MRSA can be spread from one patient to another on the hands of health care " "workers. Hands may become contaminated with MRSA when health care workers touch a patient's skin, wounds, wound dressings, or devices, such as IV tubing.    You can develop an infection from MRSA if your skin is colonized and the bacteria enter an opening (eg, a cut, scrape, or wound) in the skin.    Community-associated MRSA -- You can  MRSA outside the hospital, especially if you:    ?Have skin trauma (eg, "turf burns," cuts, or sores)  ?Are an athlete  ?Shave or wax to remove body hair, particularly of the armpits and groin  ?Have tattoos or body piercing  ?Have physical contact with a person who has a draining cut or sore or is a carrier of MRSA  ?Share personal items or equipment that is not cleaned or laundered between users (such as towels or protective sport pads)    Community-associated MRSA infections may occur more commonly in certain populations, such as  centers, prisons, in the , or in athletes who play on a team. Spread of MRSA within households is common.    Prevention in the community -- The best way to prevent and control MRSA in the community is not clear. The [place] Centers for Disease Control and Prevention has made the following recommendations [3]    ?Keep hands clean by washing thoroughly with soap and water. Hands should be wet with water and plain soap and be rubbed together for 15 to 30 seconds. Special attention should be paid to the fingernails, between the fingers, and the wrists. Hands should be rinsed thoroughly and dried with a single-use towel (eg, paper towels).  ?Alcohol-based hand sanitizers are a good alternative for disinfecting hands if a sink is not available. Hand sanitizers should be rubbed over the entire surface of hands, fingers, and wrists until dry and may be used several times. Hand sanitizers are available as a liquid or wipe in small, portable sizes that are easy to carry in a pocket or handbag. When a sink is available, visibly soiled " hands should be washed with soap and water.  ?Keep cuts and scrapes clean, dry, and covered with a bandage until healed.  ?Avoid touching other people's wounds or bandages.  ?Avoid sharing personal items such as towels, washcloths, razors, clothing, or uniforms.  Other items that should not be shared include brushes, yanes, and makeup.  ?Students who participate in team sports should shower after every athletic activity using soap and clean towels. Athletes with skin infections should receive prompt treatment and should not compete when they have draining or active skin infections.  ?People who use exercise machines at sports clubs or schools should be sure to wipe down the equipment, including the hand , with an alcohol-based solution after using it.        DECOLONIZATION:      Nasal decolonization with mupirocin ointment (2%) applied to nares once daily for 1 month, and    ?Topical body decolonization (BOTH of the following):  Chlorhexidine gluconate (2% or 4% solution): daily washes and NONSCENTED DIAL SOAP  Dilute bleach baths (one teaspoon bleach per gallon of water, or one-fourth cup bleach per one-fourth tub [approximately 13 gallons of water] for 15 minutes once weekly) for approximately one months (only in the absence of skin sores/lesions/open wounds)            How can I clean and disinfect surfaces to prevent MRSA infection?      or detergents are products that remove soil, dirt, dust, organic matter, and germs (like bacteria, viruses, and fungi). They lift dirt and germs off surfaces so they can be rinsed away with water. Cleaning with a detergent is necessary to remove dirt that can prevent disinfectants from working. Some disinfectants have a cleaning agent mixed in, check the label to know which product you have.   Disinfectants are chemical products that are used to kill germs in healthcare settings. Disinfectants effective against Staphylococcus aureus, or staph, are also effective  against MRSA. The disinfectant's label should have a list of germs that the product can kill, along with an Environmental Protection Agency (EPA) registration number. These products are also sold at grocery and other retail stores and may be helpful when someone has an infected wound.    What should I clean to prevent MRSA from spreading?     When cleaning and disinfecting, focus on surfaces that frequently contact people's bare skin like desks, chairs, benches, gym equipment, lockers, faucets, light switches and remote controls. In particular, clean any surfaces that could come into contact with uncovered wounds, cuts, or boils. In addition to cleaning surfaces, frequently cleaning hands and keeping wounds covered keeps MRSA from spreading.  Large surfaces, such as floors and walls, have not been associated with the spread of staph and MRSA. There is no evidence that spraying or fogging rooms or surfaces with disinfectants will prevent MRSA infections more effectively than the targeted approach of cleaning frequently touched surfaces and surfaces that have been exposed to open wounds.    What if I see these symptoms?     You cannot tell by looking at the skin if it's a staph infection (including MRSA).  Getting medical care early makes it less likely that the infection will become serious.  If you or someone in your family experiences the signs and symptoms of MRSA:  Contact your healthcare provider, especially if the symptoms are accompanied by a fever.   Do not pick at or pop the sore.   Cover the area with clean, dry bandages until you can see a healthcare provider.   Clean your hands often.

## 2022-09-20 NOTE — PROGRESS NOTES
Infectious Diseases Clinic Note    Subjective:       Patient ID: Carl Lorenzo is a 46 y.o. male     Chief Complaint: Follow-up        Carl Lorenzo is a 44 y.o. male    Hx of HIV, + RPR, HSV2         Here today with complaints of abscess to right ear. He has recurrent MRSA infections of head and neck. Onset 8-10 days ago.  + erythema, warmth, throbbing. Purulent drainage. There is a central scab from him picking the area.  He has been using a needle to  the pus. Abscess localized to external ear only.  Denies fever, chills, body aches. Denies preseptal/periorbital involvement. He states he has been on minocycline X 5-6 days with only mild improvement.  This is a recurrent issue for him. No visual disturbance.  Recently had immunology workup and here to discuss.     Also has recurrent HSV2. 5-6 outbreaks this year since being diagnosed. Recently requested STD workup. Here to discuss. No current lesions.       No other issues reported at this time.                Past Medical History:   Diagnosis Date    GERD (gastroesophageal reflux disease)     HIV (human immunodeficiency virus infection) 2004    Hypertension        Social History     Socioeconomic History    Marital status:    Tobacco Use    Smoking status: Never    Smokeless tobacco: Never   Substance and Sexual Activity    Alcohol use: Yes         Current Outpatient Medications:     abacavir-dolutegravir-lamivud (TRIUMEQ) 600- mg Tab, Take 1 tablet by mouth once daily., Disp: 90 tablet, Rfl: 3    ascorbic acid, vitamin C, (VITAMIN C) 100 MG tablet, Take 100 mg by mouth once daily., Disp: , Rfl:     b complex vitamins capsule, Take 1 capsule by mouth once daily., Disp: , Rfl:     LORazepam (ATIVAN) 0.5 MG tablet, Take 1 tablet (0.5 mg total) by mouth once daily., Disp: 30 tablet, Rfl: 1    minocycline (MINOCIN,DYNACIN) 100 MG capsule, Take 1 capsule (100 mg total) by mouth every 12 (twelve) hours. for 7 days, Disp: 14 capsule,  Rfl: 0    mupirocin (BACTROBAN) 2 % ointment, by Nasal route once daily., Disp: 22 g, Rfl: 5    omeprazole (PRILOSEC) 40 MG capsule, TAKE 1 CAPSULE BY MOUTH EVERY DAY, Disp: 90 capsule, Rfl: 3    pravastatin (PRAVACHOL) 10 MG tablet, Take 1 tablet (10 mg total) by mouth once daily., Disp: 90 tablet, Rfl: 3    sulfamethoxazole-trimethoprim 800-160mg (BACTRIM DS) 800-160 mg Tab, TAKE 1 TABLET BY MOUTH TWICE A DAY FOR 7 DAYS, Disp: 14 tablet, Rfl: 0    triamcinolone acetonide 0.1% (KENALOG) 0.1 % cream, APPLY TOPICALLY TWICE A DAY, Disp: 45 g, Rfl: 0    TRIUMEQ 600- mg Tab, TAKE 1 TABLET BY MOUTH EVERY DAY, Disp: 90 tablet, Rfl: 3    valACYclovir (VALTREX) 500 MG tablet, Take 1 tablet (500 mg total) by mouth 2 (two) times daily., Disp: 60 tablet, Rfl: 11    vitamin E 100 UNIT capsule, Take 100 Units by mouth once daily., Disp: , Rfl:     Review of Systems   Constitutional:  Negative for activity change, chills, diaphoresis, fatigue and fever.   HENT:  Negative for dental problem, ear pain, facial swelling, mouth sores, nosebleeds, sinus pain, sore throat, trouble swallowing and voice change.    Eyes:  Negative for pain and visual disturbance.   Respiratory:  Negative for cough, chest tightness and shortness of breath.    Cardiovascular:  Negative for chest pain, palpitations and leg swelling.   Gastrointestinal:  Negative for abdominal distention, abdominal pain and blood in stool.   Endocrine: Negative for polydipsia, polyphagia and polyuria.   Genitourinary:  Negative for dysuria, flank pain, frequency, genital sores, penile discharge and penile pain.   Musculoskeletal:  Negative for gait problem, joint swelling, neck pain and neck stiffness.   Skin:  Positive for wound. Negative for color change, pallor and rash.   Allergic/Immunologic: Positive for environmental allergies. Negative for immunocompromised state.   Neurological:  Negative for dizziness, syncope, light-headedness and headaches.   Hematological:   Negative for adenopathy. Does not bruise/bleed easily.   Psychiatric/Behavioral:  Negative for confusion, dysphoric mood, hallucinations, self-injury, sleep disturbance and suicidal ideas. The patient is not nervous/anxious and is not hyperactive.          Objective:      Vitals:    09/20/22 1452   BP: 139/88   Pulse: 86     Physical Exam  Vitals and nursing note reviewed.   Constitutional:       General: He is not in acute distress.     Appearance: Normal appearance. He is not ill-appearing.   HENT:      Head: Normocephalic and atraumatic. No right periorbital erythema or left periorbital erythema.      Jaw: No trismus or swelling.      Salivary Glands: Right salivary gland is not tender.     Neck:     Cardiovascular:      Rate and Rhythm: Normal rate.   Pulmonary:      Effort: Pulmonary effort is normal.   Abdominal:      General: Abdomen is flat.   Musculoskeletal:      Cervical back: Full passive range of motion without pain.   Lymphadenopathy:      Cervical: No cervical adenopathy.   Neurological:      Mental Status: He is alert.           Assessment/Plan:       1. Human immunodeficiency virus (HIV) disease    2. HSV-2 seropositive    3. Recurrent cellulitis    4. Secondary syphilis    5. Encounter to discuss test results    6. Abscess of external ear, right      Problem List Items Addressed This Visit          ID    Human immunodeficiency virus (HIV) disease - Primary    Current Assessment & Plan     Has been controlled up to this point. Recent workup unremarkable for immunodeficiency outside of his HIV.        PLAN    1. Decolonization for recurrent MRSA - See AVS handout.   2. Start Valtrex 500mg bid for HSV2 suppression ( >5 outbreaks this year).   3. F/u w/ Dr. Billy as scheduled.          Relevant Orders    Culture, MRSA    Secondary syphilis    Current Assessment & Plan         RPR TITER 1:4 - previously 1:8          Relevant Orders    Culture, MRSA       Immunology/Multi System    HSV-2 seropositive     Current Assessment & Plan     Needs suppressive therapy. Recommend Valtrex 500 mg bid.          Relevant Medications    valACYclovir (VALTREX) 500 MG tablet    Other Relevant Orders    Culture, MRSA     Other Visit Diagnoses       Recurrent cellulitis        See MRSA decolonization. Currently on Minocycline for ear abscess. He filled Bactrim, so advised to start it.     Relevant Medications    mupirocin (BACTROBAN) 2 % ointment    Other Relevant Orders    Culture, MRSA    Encounter to discuss test results        Abscess of external ear, right        4 pills left of Minocycline. He has been using needle to expirate pus - advised against this. he will start Bactrim DS. RTC if not improved in 4-5 days.            No visits with results within 1 Month(s) from this visit.   Latest known visit with results is:   Patient Message on 04/11/2022   Component Date Value Ref Range Status    HSV 1 IgG 04/11/2022 REACTIVE (A)  NONREACTIVE Final    Comment: The methodology for this assay is electrochemiluminescence immunoassay  (ECLIA) effective 12/10/2018. This assay performance characteristics  have not been established in patients under the age of 18 or in  populations of immunocompromised or immunosuppressed patients.      HSV 2 IgG 04/11/2022 REACTIVE (A)  NONREACTIVE Final    Comment: The methodology for this assay is electrochemiluminescence  immunoassay (ECLIA) effective 12/10/2018. This assay performance  characteristics have not been established in patients under the age of  18 or in populations of immunocompromised or immunosuppressed  patients.  NOTE  Testing performed at:  [facility], [address] [place] [address]  [id number] CLIA #:[id number]      HSV 1 Igm, IFA 04/11/2022 NEGATIVE   Final    HSV 2 IgM, IFA 04/11/2022 NEGATIVE   Final    Comment: REFERENCE RANGE: NEGATIVE    The IFA procedure for measuring IgM antibodies to HSV 1  and HSV 2 detects both type-common and type- specific  HSV antibodies. Thus,  IgM reactivity to both HSV 1  and HSV 2 may represent crossreactive HSV antibodies  rather than exposure to both HSV 1 and HSV 2.    This test was developed and its analytical performance  characteristics have been determined by [facility].  It has not been cleared or approved by FDA. This assay has  been validated pursuant to the CLIA regulations and is  used for clinical purposes.        HSV 2 IGM TITER 04/11/2022    Final    Comment: NOTE  Testing performed at:  [facility], [address] [place] [address] CLIA #: [id number]        No results found in the last 30 days.      Duration of encounter: 25 minutes  This includes face-to-face time and non face-to-face time preparing to see the patient (eg, review of tests), obtaining and/or reviewing separately obtained history, documenting clinical information in the electronic or other health record, independently interpreting resultsand communicating results to the patient/family/caregiver, or care coordination.      All diagnostic data (labs/imaging) was reviewed with the patient and/or family member in the room.  All questions were answered to their liking. The patient and/or family member voiced understanding of all instructions provided. Expectations regarding follow up and treatment plan were voiced and confirmed prior to departure. The patient was given orders/instructions at the end of the visit for reference. They were instructed to notify my office if they have not been contacted for imaging/referrals/labs/results in 1-2 weeks. They voiced understanding of all of the above.     Follow up:     Patient Instructions   MRSA EDUCATION       What are symptoms of MRSA Infection?   The symptoms of a MRSA infection depend on the part of the body that is infected. For example, people with MRSA skin infections often can get swelling, warmth, redness, and pain in infected skin. In most cases it is hard to tell if an infection is due to MRSA  "or another type of bacteria without laboratory tests that your doctor can order. Some MRSA skin infections can have a fairly typical appearance and can be confused with a spider bite. However, unless you actually see the spider, the irritation is likely not a spider bite.  Most S. aureus skin infections, including MRSA, appear as a bump or infected area on the skin that might be:  red   swollen   painful   warm to the touch   full of pus or other drainage   accompanied by a fever    HOW IS MRSA SPREAD?  You can be "colonized" with MRSA, meaning that you carry the bacteria on your skin or in your nose but you have no signs or symptoms of the illness. You can become colonized with MRSA in a variety of ways:    ?By touching the skin of another person who is colonized with MRSA  ?By touching a contaminated surface (such as a countertop, door handle, or phone)    MRSA RISK FACTORS  Anyone can become colonized and then infected with MRSA, although certain people are at a higher risk.    Hospital care -- Risk factors for becoming infected with hospital-associated MRSA include the following:    ?Having a surgical wound and/or intravenous (IV) line  ?Being hospitalized for a prolonged period of time  ?Recent use of antibiotics  ?Having a weakened immune system due to a medical condition or its treatment (eg, receiving medications that weaken the immune system)  ?Being in close proximity to other patients, family members, or health care workers who are colonized with MRSA    In hospitals and other long-term health care facilities, MRSA can be spread from one patient to another on the hands of health care workers. Hands may become contaminated with MRSA when health care workers touch a patient's skin, wounds, wound dressings, or devices, such as IV tubing.    You can develop an infection from MRSA if your skin is colonized and the bacteria enter an opening (eg, a cut, scrape, or wound) in the skin.    Community-associated MRSA " "-- You can  MRSA outside the hospital, especially if you:    ?Have skin trauma (eg, "turf burns," cuts, or sores)  ?Are an athlete  ?Shave or wax to remove body hair, particularly of the armpits and groin  ?Have tattoos or body piercing  ?Have physical contact with a person who has a draining cut or sore or is a carrier of MRSA  ?Share personal items or equipment that is not cleaned or laundered between users (such as towels or protective sport pads)    Community-associated MRSA infections may occur more commonly in certain populations, such as  centers, prisons, in the , or in athletes who play on a team. Spread of MRSA within households is common.    Prevention in the community -- The best way to prevent and control MRSA in the community is not clear. The [place] Centers for Disease Control and Prevention has made the following recommendations [3]    ?Keep hands clean by washing thoroughly with soap and water. Hands should be wet with water and plain soap and be rubbed together for 15 to 30 seconds. Special attention should be paid to the fingernails, between the fingers, and the wrists. Hands should be rinsed thoroughly and dried with a single-use towel (eg, paper towels).  ?Alcohol-based hand sanitizers are a good alternative for disinfecting hands if a sink is not available. Hand sanitizers should be rubbed over the entire surface of hands, fingers, and wrists until dry and may be used several times. Hand sanitizers are available as a liquid or wipe in small, portable sizes that are easy to carry in a pocket or handbag. When a sink is available, visibly soiled hands should be washed with soap and water.  ?Keep cuts and scrapes clean, dry, and covered with a bandage until healed.  ?Avoid touching other people's wounds or bandages.  ?Avoid sharing personal items such as towels, washcloths, razors, clothing, or uniforms.  Other items that should not be shared include brushes, yanes, and " makeup.  ?Students who participate in team sports should shower after every athletic activity using soap and clean towels. Athletes with skin infections should receive prompt treatment and should not compete when they have draining or active skin infections.  ?People who use exercise machines at sports clubs or schools should be sure to wipe down the equipment, including the hand , with an alcohol-based solution after using it.        DECOLONIZATION:      Nasal decolonization with mupirocin ointment (2%) applied to nares once daily for 1 month, and    ?Topical body decolonization (BOTH of the following):  Chlorhexidine gluconate (2% or 4% solution): daily washes and NONSCENTED DIAL SOAP  Dilute bleach baths (one teaspoon bleach per gallon of water, or one-fourth cup bleach per one-fourth tub [approximately 13 gallons of water] for 15 minutes once weekly) for approximately one months (only in the absence of skin sores/lesions/open wounds)            How can I clean and disinfect surfaces to prevent MRSA infection?      or detergents are products that remove soil, dirt, dust, organic matter, and germs (like bacteria, viruses, and fungi). They lift dirt and germs off surfaces so they can be rinsed away with water. Cleaning with a detergent is necessary to remove dirt that can prevent disinfectants from working. Some disinfectants have a cleaning agent mixed in, check the label to know which product you have.   Disinfectants are chemical products that are used to kill germs in healthcare settings. Disinfectants effective against Staphylococcus aureus, or staph, are also effective against MRSA. The disinfectant's label should have a list of germs that the product can kill, along with an Environmental Protection Agency (EPA) registration number. These products are also sold at grocery and other retail stores and may be helpful when someone has an infected wound.    What should I clean to prevent MRSA from  spreading?     When cleaning and disinfecting, focus on surfaces that frequently contact people's bare skin like desks, chairs, benches, gym equipment, lockers, faucets, light switches and remote controls. In particular, clean any surfaces that could come into contact with uncovered wounds, cuts, or boils. In addition to cleaning surfaces, frequently cleaning hands and keeping wounds covered keeps MRSA from spreading.  Large surfaces, such as floors and walls, have not been associated with the spread of staph and MRSA. There is no evidence that spraying or fogging rooms or surfaces with disinfectants will prevent MRSA infections more effectively than the targeted approach of cleaning frequently touched surfaces and surfaces that have been exposed to open wounds.    What if I see these symptoms?     You cannot tell by looking at the skin if it's a staph infection (including MRSA).  Getting medical care early makes it less likely that the infection will become serious.  If you or someone in your family experiences the signs and symptoms of MRSA:  Contact your healthcare provider, especially if the symptoms are accompanied by a fever.   Do not pick at or pop the sore.   Cover the area with clean, dry bandages until you can see a healthcare provider.   Clean your hands often.          Follow up if symptoms worsen or fail to improve.

## 2022-09-22 ENCOUNTER — PATIENT MESSAGE (OUTPATIENT)
Dept: INFECTIOUS DISEASES | Facility: CLINIC | Age: 47
End: 2022-09-22
Payer: COMMERCIAL

## 2022-09-22 DIAGNOSIS — A49.02 MRSA INFECTION: Primary | ICD-10-CM

## 2022-09-22 RX ORDER — LINEZOLID 600 MG/1
600 TABLET, FILM COATED ORAL EVERY 12 HOURS
Qty: 20 TABLET | Refills: 0 | Status: SHIPPED | OUTPATIENT
Start: 2022-09-22 | End: 2022-10-02

## 2022-10-18 ENCOUNTER — PATIENT OUTREACH (OUTPATIENT)
Dept: ADMINISTRATIVE | Facility: HOSPITAL | Age: 47
End: 2022-10-18
Payer: COMMERCIAL

## 2022-10-18 NOTE — PROGRESS NOTES
COLON REPORT: No Colonoscopy results found in [employer], [employer],  or [facility]WHERE.

## 2022-11-02 DIAGNOSIS — B20 HUMAN IMMUNODEFICIENCY VIRUS (HIV) DISEASE: ICD-10-CM

## 2022-11-03 ENCOUNTER — PATIENT MESSAGE (OUTPATIENT)
Dept: INFECTIOUS DISEASES | Facility: CLINIC | Age: 47
End: 2022-11-03
Payer: COMMERCIAL

## 2022-11-03 RX ORDER — ABACAVIR SULFATE, DOLUTEGRAVIR SODIUM, LAMIVUDINE 600; 50; 300 MG/1; MG/1; MG/1
TABLET, FILM COATED ORAL
Qty: 90 TABLET | Refills: 3 | Status: SHIPPED | OUTPATIENT
Start: 2022-11-03 | End: 2023-02-18 | Stop reason: SDUPTHER

## 2022-11-03 NOTE — TELEPHONE ENCOUNTER
Called patient no answer left message on voicemail to call the clinic back to informed that someone from Dr. Pathak's office will be contacting him.

## 2022-11-11 ENCOUNTER — TELEPHONE (OUTPATIENT)
Dept: GASTROENTEROLOGY | Facility: CLINIC | Age: 47
End: 2022-11-11
Payer: COMMERCIAL

## 2022-11-11 NOTE — TELEPHONE ENCOUNTER
----- Message from Evangelina Kincaid LPN sent at 11/3/2022  3:13 PM CDT -----  Please schedule colonoscopy @ [facility] with [facility]. Pt's referral is from Feb/2022.

## 2022-11-16 ENCOUNTER — PATIENT MESSAGE (OUTPATIENT)
Dept: ADMINISTRATIVE | Facility: HOSPITAL | Age: 47
End: 2022-11-16
Payer: COMMERCIAL

## 2023-01-12 ENCOUNTER — PATIENT MESSAGE (OUTPATIENT)
Dept: INFECTIOUS DISEASES | Facility: CLINIC | Age: 48
End: 2023-01-12
Payer: COMMERCIAL

## 2023-04-27 ENCOUNTER — PATIENT MESSAGE (OUTPATIENT)
Dept: INFECTIOUS DISEASES | Facility: CLINIC | Age: 48
End: 2023-04-27
Payer: COMMERCIAL

## 2023-04-27 DIAGNOSIS — A51.49 SECONDARY SYPHILIS: ICD-10-CM

## 2023-04-27 DIAGNOSIS — Z12.5 SCREENING FOR MALIGNANT NEOPLASM OF PROSTATE: ICD-10-CM

## 2023-04-27 DIAGNOSIS — Z79.899 LONG TERM USE OF DRUG: ICD-10-CM

## 2023-04-27 DIAGNOSIS — Z00.00 PREVENTATIVE HEALTH CARE: Primary | ICD-10-CM

## 2023-04-27 DIAGNOSIS — B20 HUMAN IMMUNODEFICIENCY VIRUS (HIV) DISEASE: ICD-10-CM

## 2023-05-01 ENCOUNTER — TELEPHONE (OUTPATIENT)
Dept: FAMILY MEDICINE | Facility: CLINIC | Age: 48
End: 2023-05-01
Payer: COMMERCIAL

## 2023-05-01 DIAGNOSIS — B20 HUMAN IMMUNODEFICIENCY VIRUS (HIV) DISEASE: ICD-10-CM

## 2023-05-01 DIAGNOSIS — Z12.11 SCREENING FOR COLON CANCER: Primary | ICD-10-CM

## 2023-05-11 RX ORDER — OMEPRAZOLE 40 MG/1
40 CAPSULE, DELAYED RELEASE ORAL EVERY MORNING
Qty: 90 CAPSULE | Refills: 3 | Status: SHIPPED | OUTPATIENT
Start: 2023-05-11 | End: 2023-07-06

## 2023-05-11 RX ORDER — PRAVASTATIN SODIUM 10 MG/1
10 TABLET ORAL NIGHTLY
Qty: 90 TABLET | Refills: 3 | Status: SHIPPED | OUTPATIENT
Start: 2023-05-11 | End: 2023-07-06

## 2023-05-16 DIAGNOSIS — G47.00 INSOMNIA, UNSPECIFIED TYPE: ICD-10-CM

## 2023-05-17 RX ORDER — LORAZEPAM 0.5 MG/1
TABLET ORAL
Qty: 30 TABLET | Refills: 1 | Status: SHIPPED | OUTPATIENT
Start: 2023-05-17 | End: 2023-09-11

## 2023-05-30 ENCOUNTER — PATIENT MESSAGE (OUTPATIENT)
Dept: INFECTIOUS DISEASES | Facility: CLINIC | Age: 48
End: 2023-05-30
Payer: COMMERCIAL

## 2023-06-05 ENCOUNTER — OFFICE VISIT (OUTPATIENT)
Dept: INFECTIOUS DISEASES | Facility: CLINIC | Age: 48
End: 2023-06-05
Payer: COMMERCIAL

## 2023-06-05 VITALS
SYSTOLIC BLOOD PRESSURE: 127 MMHG | BODY MASS INDEX: 26.92 KG/M2 | OXYGEN SATURATION: 97 % | WEIGHT: 193 LBS | DIASTOLIC BLOOD PRESSURE: 71 MMHG

## 2023-06-05 DIAGNOSIS — R76.8 HSV-2 SEROPOSITIVE: ICD-10-CM

## 2023-06-05 DIAGNOSIS — Z71.2 ENCOUNTER TO DISCUSS TEST RESULTS: ICD-10-CM

## 2023-06-05 DIAGNOSIS — A51.49 SECONDARY SYPHILIS: ICD-10-CM

## 2023-06-05 DIAGNOSIS — E78.5 HYPERLIPIDEMIA, UNSPECIFIED HYPERLIPIDEMIA TYPE: ICD-10-CM

## 2023-06-05 DIAGNOSIS — Z23 NEED FOR HEPATITIS B VACCINATION: ICD-10-CM

## 2023-06-05 DIAGNOSIS — B20 HUMAN IMMUNODEFICIENCY VIRUS (HIV) DISEASE: Primary | ICD-10-CM

## 2023-06-05 DIAGNOSIS — R80.9 PROTEINURIA, UNSPECIFIED TYPE: ICD-10-CM

## 2023-06-05 PROCEDURE — 3008F BODY MASS INDEX DOCD: CPT | Mod: CPTII,S$GLB,, | Performed by: NURSE PRACTITIONER

## 2023-06-05 PROCEDURE — 99214 OFFICE O/P EST MOD 30 MIN: CPT | Mod: S$GLB,,, | Performed by: NURSE PRACTITIONER

## 2023-06-05 PROCEDURE — 99214 PR OFFICE/OUTPT VISIT, EST, LEVL IV, 30-39 MIN: ICD-10-PCS | Mod: S$GLB,,, | Performed by: NURSE PRACTITIONER

## 2023-06-05 PROCEDURE — 3078F PR MOST RECENT DIASTOLIC BLOOD PRESSURE < 80 MM HG: ICD-10-PCS | Mod: CPTII,S$GLB,, | Performed by: NURSE PRACTITIONER

## 2023-06-05 PROCEDURE — 3078F DIAST BP <80 MM HG: CPT | Mod: CPTII,S$GLB,, | Performed by: NURSE PRACTITIONER

## 2023-06-05 PROCEDURE — 1159F PR MEDICATION LIST DOCUMENTED IN MEDICAL RECORD: ICD-10-PCS | Mod: CPTII,S$GLB,, | Performed by: NURSE PRACTITIONER

## 2023-06-05 PROCEDURE — 3008F PR BODY MASS INDEX (BMI) DOCUMENTED: ICD-10-PCS | Mod: CPTII,S$GLB,, | Performed by: NURSE PRACTITIONER

## 2023-06-05 PROCEDURE — 1159F MED LIST DOCD IN RCRD: CPT | Mod: CPTII,S$GLB,, | Performed by: NURSE PRACTITIONER

## 2023-06-05 PROCEDURE — 3074F SYST BP LT 130 MM HG: CPT | Mod: CPTII,S$GLB,, | Performed by: NURSE PRACTITIONER

## 2023-06-05 PROCEDURE — 3074F PR MOST RECENT SYSTOLIC BLOOD PRESSURE < 130 MM HG: ICD-10-PCS | Mod: CPTII,S$GLB,, | Performed by: NURSE PRACTITIONER

## 2023-06-05 NOTE — ASSESSMENT & PLAN NOTE
HIV-1 RNA, Quantitative, Real-Time PCR IG    HIV-1 RNA, QN PCR IGNOT DETECTED NOT DETECTED copies/mL    HIV-1 RNA, QN PCR IGNOT DETECTED NOT DETECTED Log copies/mL       LYMPHOCYTE SUBSET PANEL 4 IG    % CD4 (HELPER CELLS) IG42 30-61 %    Absolute CD4+ Cells  490-1740 cells/uL    %CD8 SUPPRESSOR T CELL IG36           Hep B Surf Antibody indeterminate     PLAN    1. Continue current therapy w/ Triumeq.   2. Hep B Surf Antibody indeterminate - pt given Twinrix vaccine.   3. Will get RPR w/ reflex.

## 2023-06-05 NOTE — PROGRESS NOTES
Infectious Diseases Clinic Note    Subjective:       Patient ID: Carl Lorenzo is a 47 y.o. male     Chief Complaint: Follow-up and Results        Carl Lorenzo is a 44 y.o. male here today f/u regarding HIV and syphilis      He reports that he was diagnosed in approximately 2005. He is currently on Triumeq. Previous ID MD was in [place].  He reports that he responded very well to therapy and has been doing well since that time. He is 100% compliant with meds. He has not missed any doses in the past month.  No side effects reported.  He had labs done prior to arrival and is here to discuss.  Denies opportunistic infections.  Denies fever chills myalgias lymphadenopathy.  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       No other issues reported at this time.                Past Medical History:   Diagnosis Date    GERD (gastroesophageal reflux disease)     HIV (human immunodeficiency virus infection) 01/01/2004    Hypertension        Social History     Socioeconomic History    Marital status:    Tobacco Use    Smoking status: Never    Smokeless tobacco: Never   Substance and Sexual Activity    Alcohol use: Yes         Current Outpatient Medications:     abacavir-dolutegravir-lamivud (TRIUMEQ) 600- mg Tab, Take 1 tablet by mouth once daily., Disp: 90 tablet, Rfl: 3    abacavir-dolutegravir-lamivud (TRIUMEQ) 600- mg Tab, Take 1 tablet by mouth once daily., Disp: 90 tablet, Rfl: 3    ascorbic acid, vitamin C, (VITAMIN C) 100 MG tablet, Take 100 mg by mouth once daily., Disp: , Rfl:     b complex vitamins capsule, Take 1 capsule by mouth once daily., Disp: , Rfl:     hepatitis A and B vaccine, PF, (TWINRIX) 720 NACHO unit- 20 mcg/mL Syrg suspension, 1 mL given IM on a 0-, 1-, and 6-month schedule for a total of 3 doses, Disp: 1 mL, Rfl: 2    LORazepam (ATIVAN) 0.5 MG tablet, TAKE 1 TABLET BY MOUTH EVERY DAY, Disp: 30 tablet, Rfl: 1    omeprazole (PRILOSEC) 40 MG capsule, Take 1  capsule (40 mg total) by mouth every morning., Disp: 90 capsule, Rfl: 3    pravastatin (PRAVACHOL) 10 MG tablet, Take 1 tablet (10 mg total) by mouth every evening., Disp: 90 tablet, Rfl: 3    triamcinolone acetonide 0.1% (KENALOG) 0.1 % cream, APPLY TOPICALLY TWICE A DAY, Disp: 45 g, Rfl: 0    valACYclovir (VALTREX) 500 MG tablet, Take 1 tablet (500 mg total) by mouth 2 (two) times daily., Disp: 60 tablet, Rfl: 11    vitamin E 100 UNIT capsule, Take 100 Units by mouth once daily., Disp: , Rfl:     Review of Systems   Constitutional:  Negative for activity change, chills, diaphoresis, fatigue and fever.   HENT:  Negative for ear pain, mouth sores, nosebleeds and trouble swallowing.    Eyes:  Negative for pain and visual disturbance.   Respiratory:  Negative for cough, chest tightness and shortness of breath.    Cardiovascular:  Negative for chest pain, palpitations and leg swelling.   Gastrointestinal:  Negative for abdominal distention, abdominal pain and blood in stool.   Endocrine: Negative for polydipsia, polyphagia and polyuria.   Genitourinary:  Negative for flank pain, frequency, genital sores, penile discharge and testicular pain.   Musculoskeletal:  Negative for gait problem, joint swelling, neck pain and neck stiffness.   Skin:  Negative for color change, pallor and rash.   Allergic/Immunologic: Negative for immunocompromised state.   Neurological:  Negative for dizziness, syncope, light-headedness and headaches.   Hematological:  Negative for adenopathy. Does not bruise/bleed easily.   Psychiatric/Behavioral:  Negative for confusion, dysphoric mood, hallucinations, self-injury, sleep disturbance and suicidal ideas. The patient is not nervous/anxious and is not hyperactive.          Objective:      Vitals:    06/05/23 1448   BP: 127/71     Physical Exam  Vitals and nursing note reviewed.   Constitutional:       General: He is not in acute distress.     Appearance: Normal appearance. He is well-developed  and normal weight. He is not ill-appearing.   HENT:      Head: Normocephalic and atraumatic.   Eyes:      General: No scleral icterus.     Pupils: Pupils are equal, round, and reactive to light.   Neck:      Vascular: No JVD.      Trachea: No tracheal deviation.   Cardiovascular:      Rate and Rhythm: Normal rate.   Pulmonary:      Effort: Pulmonary effort is normal. No respiratory distress.   Musculoskeletal:         General: No tenderness. Normal range of motion.      Cervical back: Neck supple.   Skin:     General: Skin is warm and dry.      Findings: No erythema or rash.   Neurological:      Mental Status: He is alert and oriented to person, place, and time. Mental status is at baseline.   Psychiatric:         Attention and Perception: Attention and perception normal.         Mood and Affect: Mood and affect normal.         Speech: Speech normal.         Behavior: Behavior normal. Behavior is cooperative.         Thought Content: Thought content normal.         Cognition and Memory: Cognition and memory normal.         Judgment: Judgment normal.           Assessment/Plan:       1. Human immunodeficiency virus (HIV) disease    2. Need for hepatitis B vaccination    3. Proteinuria, unspecified type    4. Secondary syphilis    5. Hyperlipidemia, unspecified hyperlipidemia type    6. HSV-2 seropositive    7. Encounter to discuss test results      Problem List Items Addressed This Visit          Cardiac/Vascular    Hyperlipidemia    Current Assessment & Plan     Controlled. Continue pravachol             ID    Human immunodeficiency virus (HIV) disease - Primary    Current Assessment & Plan       HIV-1 RNA, Quantitative, Real-Time PCR IG    HIV-1 RNA, QN PCR IGNOT DETECTED NOT DETECTED copies/mL    HIV-1 RNA, QN PCR IGNOT DETECTED NOT DETECTED Log copies/mL       LYMPHOCYTE SUBSET PANEL 4 IG    % CD4 (HELPER CELLS) IG42 30-61 %    Absolute CD4+ Cells  490-1740 cells/uL    %CD8 SUPPRESSOR T CELL IG36            "Hep B Surf Antibody indeterminate     PLAN    1. Continue current therapy w/ Triumeq.   2. Hep B Surf Antibody indeterminate - pt given Twinrix vaccine.   3. Will get RPR w/ reflex.                Relevant Medications    hepatitis A and B vaccine, PF, (TWINRIX) 720 NACHO unit- 20 mcg/mL Syrg suspension    Other Relevant Orders    Treponema Pallidum (Syphillis) Antibody    Secondary syphilis    Current Assessment & Plan         Need to update titer. Pt given order to do on his own time.             Immunology/Multi System    HSV-2 seropositive    Current Assessment & Plan     Continue suppressive therapy.           Other Visit Diagnoses       Need for hepatitis B vaccination        Relevant Medications    hepatitis A and B vaccine, PF, (TWINRIX) 720 NACHO unit- 20 mcg/mL Syrg suspension    Proteinuria, unspecified type        only mild. likely reactive to "UTI" he was treated for.... repeat UA     Relevant Orders    Urinalysis, Reflex to Urine Culture Urine, Clean Catch    Encounter to discuss test results               The following labs were reviewed with the patient:       PSA(DIAGNOSTIC) 1.840 0.00-4.00 ng/mL  This method has been standardized against the WHO (World Health  Organization) reference standard.  Methodology used - electrochemiluminescence immunoassay (ECLIA)  PSA values determined on patient samples by different testing   laboratories cannot be directly compared with one another and could be  the cause of erroneous medical interpretations.   PROFILE COMP. METABOLIC    GLUCOSE 107 H  mg/dL    BUN 12.0 6-20 mg/dL    CREATININE 1.28 H 0.70-1.20 mg/dL  Recommend repeat creatinine within 90 days.     AST (SGOT) 66 H 0-40 U/L    ALT (SGPT) 65 H 0-41 U/L    ALKALINE PHOSPHATASE 88  U/L    CALCIUM 9.9 8.6-10.2 mg/dL    PROTEIN, TOTAL 7.9 6.4-8.3 g/dL    ALBUMIN 4.2 3.5-5.2 g/dL    BILIRUBIN, TOTAL 0.35 0.00-1.20 mg/dL    SODIUM 137 136-145 mmol/L    POTASSIUM 4.2 3.5-5.1 mmol/L    CHLORIDE 98 "  mmol/L    CARBON DIOXIDE 26 22-29 mmol/L    GLOBULIN 3.7 1.5-4.5 g/dL    A/G RATIO 1.1 1.0-2.7    BUN/CREATININE RATIO 9.4 6-20    GFR ESTIMATION 68.61 >60.00    ANION GAP 13.0 8.0-17.0 mmol/L  LIPID II PROFILE  SHAHZAD SHELL Page: 1 (Continued)  CHOLESTEROL 177 100-200 mg/dL  Desirable  <200  Borderline 200-240  High risk  >240    TRIGLYCERIDES 147 0-150 mg/dL    HDL 59 >60 mg/dL  <40 mg/dL Major risk factor for CHD  >60 mg/dL Negative risk factor for CHD    LDL 88.6 0-100 mg/dL    RISK LDL/HDL RATIO 1.5 1-3  CBC (COMPLETE BLOOD COUNT)    WBC 8.03 4.3-10.8 X 10 3/ul    RBC 4.21 L 4.7-6.1 X 10 6/ul    RDW-SD 44.2 37-54 fl    HEMOGLOBIN 13.7 L 14-18 g/dL    HEMATOCRIT 40.6 L 42-52 %    MCV 96.4 H 80-94 fl    MCH 32.5 H 27-32 pg    MCHC 33.7 32-36 g/dL    PLATELET COUNT 153 135-400 X 10 3/ul    NEUTROPHILS 70.9 34-71.1 %    LYMPHOCYTES 18.3 L 19.3-53.1 %    MONOCYTES 9.5 4.7-12.5 %    EOSINOPHILS 1.0 0.7-7.0 %    BASOPHILS 0.2 0.2-1.2 %    ABSOLUTE NEUT. COUNT 5.69 2.15-7.56 X 10 3/ul    ABSOLUTE LYMPHOCYTE 1.47 0.86-4.75 X 10 3/ul    ABSOLUTE MONOCYTE 0.76 0.22-1.08 X 10 3/ul    ABSOLUTE EOSINOPHIL 0.08 0.04-0.54 X 10 3/ul    ABSOLUTE BASOPHIL 0.02 0.00-0.22 X 10 3/ul    ABSOLUTE IMMATURE GRAN 0.01 0-0.04 X 10 3/ul    IMMATURE GRAN 0.1 0-0.5 %  IG includes metamyelocytes, myelocytes, and promyelocytes     NRBC% 0.0 0-0.2 /100 WBC    ABS NRBC COUNT 0.000 0-0.012 x 10 3/ul  HEPATITIS B SURFACE AG (HBsAg)    HEPATITIS B SURFACE AG NONREACTIVE NONREACTIVE  HEPATITIS B SURFACE AB INDETERMINATE NEGATIVE  SHAHZAD SHELL Page: 2 (Continued)              No visits with results within 1 Month(s) from this visit.   Latest known visit with results is:   Patient Message on 04/11/2022   Component Date Value Ref Range Status    HSV 1 IgG 04/11/2022 REACTIVE (A)  NONREACTIVE Final    Comment: The methodology for this assay is electrochemiluminescence immunoassay  (ECLIA) effective 12/10/2018. This assay performance  characteristics  have not been established in patients under the age of 18 or in  populations of immunocompromised or immunosuppressed patients.      HSV 2 IgG 04/11/2022 REACTIVE (A)  NONREACTIVE Final    Comment: The methodology for this assay is electrochemiluminescence  immunoassay (ECLIA) effective 12/10/2018. This assay performance  characteristics have not been established in patients under the age of  18 or in populations of immunocompromised or immunosuppressed  patients.  NOTE  Testing performed at:  [facility], [address] [place] [address]  [id number] CLIA #:[id number]      HSV 1 Igm, IFA 04/11/2022 NEGATIVE   Final    HSV 2 IgM, IFA 04/11/2022 NEGATIVE   Final    Comment: REFERENCE RANGE: NEGATIVE    The IFA procedure for measuring IgM antibodies to HSV 1  and HSV 2 detects both type-common and type- specific  HSV antibodies. Thus, IgM reactivity to both HSV 1  and HSV 2 may represent crossreactive HSV antibodies  rather than exposure to both HSV 1 and HSV 2.    This test was developed and its analytical performance  characteristics have been determined by [employer].  It has not been cleared or approved by FDA. This assay has  been validated pursuant to the CLIA regulations and is  used for clinical purposes.        HSV 2 IGM TITER 04/11/2022    Final    Comment: NOTE  Testing performed at:  [facility], [address] [place] [address] CLIA #: [id number]        No results found in the last 30 days.      Duration of encounter: 30 minutes  This includes face-to-face time and non face-to-face time preparing to see the patient (eg, review of tests), obtaining and/or reviewing separately obtained history, documenting clinical information in the electronic or other health record, independently interpreting resultsand communicating results to the patient/family/caregiver, or care coordination.      All diagnostic data (labs/imaging) was reviewed with the patient and/or  family member in the room.  All questions were answered to their liking. The patient and/or family member voiced understanding of all instructions provided. Expectations regarding follow up and treatment plan were voiced and confirmed prior to departure. The patient was given orders/instructions at the end of the visit for reference. They were instructed to notify my office if they have not been contacted for imaging/referrals/labs/results in 1-2 weeks. They voiced understanding of all of the above.     Follow up:     There are no Patient Instructions on file for this visit.     Follow up in about 6 months (around 12/5/2023), or if symptoms worsen or fail to improve.

## 2023-06-06 ENCOUNTER — CLINICAL SUPPORT (OUTPATIENT)
Dept: SURGERY | Facility: CLINIC | Age: 48
End: 2023-06-06
Payer: COMMERCIAL

## 2023-06-06 VITALS — WEIGHT: 193.31 LBS | HEIGHT: 71 IN | BODY MASS INDEX: 27.06 KG/M2

## 2023-06-06 DIAGNOSIS — Z12.11 SCREENING FOR COLON CANCER: Primary | ICD-10-CM

## 2023-06-09 DIAGNOSIS — Z12.11 SCREENING FOR COLON CANCER: Primary | ICD-10-CM

## 2023-06-09 RX ORDER — POLYETHYLENE GLYCOL 3350, SODIUM SULFATE, SODIUM CHLORIDE, POTASSIUM CHLORIDE, SODIUM ASCORBATE, AND ASCORBIC ACID 7.5-2.691G
2000 KIT ORAL ONCE
Qty: 1 KIT | Refills: 0 | Status: CANCELLED | OUTPATIENT
Start: 2023-06-09 | End: 2023-06-09

## 2023-06-09 RX ORDER — SOD SULF/POT CHLORIDE/MAG SULF 1.479 G
12 TABLET ORAL DAILY
Qty: 24 TABLET | Refills: 0 | Status: CANCELLED | OUTPATIENT
Start: 2023-06-09

## 2023-06-09 NOTE — PROGRESS NOTES
"[facility] - General [facility]  [address], [address], [address]  [place] [address]  Phone: [phone]  Fax: [phone]    History & Physical         Provider: Dr. Marques Douglas DO    Patient Name: Carl Lorenzo                                                                       (age):1975  47 y.o.           Gender: male   Phone: [phone]     Referring Physician:  Jose Boykin NP    Vital Signs:   Height - 5' 11"   Weight - 87.7 kg  BMI -  26.96 kg/m2    Plan: Colonoscopy    Encounter Diagnosis   Name Primary?    Screening for colon cancer Yes           History:      Past Medical History:   Diagnosis Date    GERD (gastroesophageal reflux disease)     HIV (human immunodeficiency virus infection) 2004    Hypertension       History reviewed. No pertinent surgical history.   Medication List with Changes/Refills   Current Medications    ABACAVIR-DOLUTEGRAVIR-LAMIVUD (TRIUMEQ) 600- MG TAB    Take 1 tablet by mouth once daily.    ABACAVIR-DOLUTEGRAVIR-LAMIVUD (TRIUMEQ) 600- MG TAB    Take 1 tablet by mouth once daily.    ASCORBIC ACID, VITAMIN C, (VITAMIN C) 100 MG TABLET    Take 100 mg by mouth once daily.    B COMPLEX VITAMINS CAPSULE    Take 1 capsule by mouth once daily.    HEPATITIS A AND B VACCINE, PF, (TWINRIX) 720 NACHO UNIT- 20 MCG/ML SYRG SUSPENSION    1 mL given IM on a 0-, 1-, and 6-month schedule for a total of 3 doses    LORAZEPAM (ATIVAN) 0.5 MG TABLET    TAKE 1 TABLET BY MOUTH EVERY DAY    OMEPRAZOLE (PRILOSEC) 40 MG CAPSULE    Take 1 capsule (40 mg total) by mouth every morning.    PRAVASTATIN (PRAVACHOL) 10 MG TABLET    Take 1 tablet (10 mg total) by mouth every evening.    TRIAMCINOLONE ACETONIDE 0.1% (KENALOG) 0.1 % CREAM    APPLY TOPICALLY TWICE A DAY    VALACYCLOVIR (VALTREX) 500 MG TABLET    Take 1 tablet (500 mg total) by mouth 2 (two) times daily.    VITAMIN E 100 UNIT CAPSULE    Take 100 Units by mouth once daily.      Review of patient's allergies " indicates:  No Known Allergies   Family History   Problem Relation Age of Onset    Thyroid disease Mother     Diabetes Mother     Diabetes Father       Social History     Tobacco Use    Smoking status: Never    Smokeless tobacco: Never   Substance Use Topics    Alcohol use: Yes        Physical Examination:     General Appearance:___________________________  HEENT: _____________________________________  Abdomen:____________________________________  Heart:________________________________________  Lungs:_______________________________________  Extremities:___________________________________  Skin:_________________________________________  Endocrine:____________________________________  Genitourinary:_________________________________  Neurological:__________________________________      Patient has been evaluated immediately prior to sedation and is medically cleared for endoscopy with IVCS as an ASA class: ______      Physician Signature: _________________________       Date: ________  Time: ________

## 2023-06-09 NOTE — PROGRESS NOTES
Pt is here for colonoscopy instructions - colonoscopy scheduled 06/16/2023 w/ Dr. Douglas. Pt received packet of instructions and verbalized understanding. All questions were answered. Orders faxed to  fx [phone]. Fax successfully went through. Sutab pended into pt's chart for MD to approve on Monday - coupon given to pt.

## 2023-07-06 RX ORDER — PRAVASTATIN SODIUM 10 MG/1
TABLET ORAL
Qty: 90 TABLET | Refills: 3 | Status: SHIPPED | OUTPATIENT
Start: 2023-07-06 | End: 2023-09-25

## 2023-07-13 DIAGNOSIS — Z12.11 SCREENING FOR COLON CANCER: Primary | ICD-10-CM

## 2023-07-17 RX ORDER — SOD SULF/POT CHLORIDE/MAG SULF 1.479 G
12 TABLET ORAL DAILY
Qty: 24 TABLET | Refills: 0 | Status: SHIPPED | OUTPATIENT
Start: 2023-07-17

## 2023-07-17 RX ORDER — POLYETHYLENE GLYCOL 3350, SODIUM SULFATE, SODIUM CHLORIDE, POTASSIUM CHLORIDE, SODIUM ASCORBATE, AND ASCORBIC ACID 7.5-2.691G
2000 KIT ORAL ONCE
Qty: 1 KIT | Refills: 0 | Status: SHIPPED | OUTPATIENT
Start: 2023-07-17 | End: 2023-07-17

## 2023-07-27 DIAGNOSIS — Z12.11 SCREENING FOR COLON CANCER: Primary | ICD-10-CM

## 2023-07-27 NOTE — PROGRESS NOTES
"[facility]  [address], [address]  [place] [address]  Phone: [phone]  Fax: [phone]    History & Physical         Provider: Dr. Marques Douglas DO    Patient Name: Carl Lorenzo                                                                       (age):1975  47 y.o.           Gender: male   Phone: [phone]     Referring Physician:  Jose Boykin NP    Vital Signs:   Height - 5' 11"  Weight - 87.7 kg    Chief Complaint: Pt is due for colonoscopy screening.     Plan: Colonoscopy    Encounter Diagnosis   Name Primary?    Screening for colon cancer Yes           History:      Past Medical History:   Diagnosis Date    GERD (gastroesophageal reflux disease)     HIV (human immunodeficiency virus infection) 2004    Hypertension       No past surgical history on file.   Medication List with Changes/Refills   Current Medications    ABACAVIR-DOLUTEGRAVIR-LAMIVUD (TRIUMEQ) 600- MG TAB    Take 1 tablet by mouth once daily.    ABACAVIR-DOLUTEGRAVIR-LAMIVUD (TRIUMEQ) 600- MG TAB    Take 1 tablet by mouth once daily.    ASCORBIC ACID, VITAMIN C, (VITAMIN C) 100 MG TABLET    Take 100 mg by mouth once daily.    B COMPLEX VITAMINS CAPSULE    Take 1 capsule by mouth once daily.    HEPATITIS A AND B VACCINE, PF, (TWINRIX) 720 NACHO UNIT- 20 MCG/ML SYRG SUSPENSION    1 mL given IM on a 0-, 1-, and 6-month schedule for a total of 3 doses    LORAZEPAM (ATIVAN) 0.5 MG TABLET    TAKE 1 TABLET BY MOUTH EVERY DAY    OMEPRAZOLE (PRILOSEC) 40 MG CAPSULE    TAKE 1 CAPSULE BY MOUTH EVERY DAY    PRAVASTATIN (PRAVACHOL) 10 MG TABLET    TAKE 1 TABLET BY MOUTH EVERY DAY    SOD SULF-POT CHLORIDE-MAG SULF (SUTAB) 1.479-0.188- 0.225 GRAM TABLET    Take 12 tablets by mouth once daily. Take according to package instructions with indicated amount of water.    TRIAMCINOLONE ACETONIDE 0.1% (KENALOG) 0.1 % CREAM    APPLY TOPICALLY TWICE A DAY    VALACYCLOVIR (VALTREX) 500 MG TABLET  "   Take 1 tablet (500 mg total) by mouth 2 (two) times daily.    VITAMIN E 100 UNIT CAPSULE    Take 100 Units by mouth once daily.      Review of patient's allergies indicates:  No Known Allergies   Family History   Problem Relation Age of Onset    Thyroid disease Mother     Diabetes Mother     Diabetes Father       Social History     Tobacco Use    Smoking status: Never    Smokeless tobacco: Never   Substance Use Topics    Alcohol use: Yes        Physical Examination:     General Appearance:___________________________  HEENT: _____________________________________  Abdomen:____________________________________  Heart:________________________________________  Lungs:_______________________________________  Extremities:___________________________________  Skin:_________________________________________  Endocrine:____________________________________  Genitourinary:_________________________________  Neurological:__________________________________      Patient has been evaluated immediately prior to sedation and is medically cleared for endoscopy with IVCS as an ASA class: ______      Physician Signature: _________________________       Date: ________  Time: ________

## 2023-09-05 DIAGNOSIS — G47.00 INSOMNIA, UNSPECIFIED TYPE: ICD-10-CM

## 2023-09-11 RX ORDER — LORAZEPAM 0.5 MG/1
TABLET ORAL
Qty: 30 TABLET | Refills: 1 | Status: SHIPPED | OUTPATIENT
Start: 2023-09-11 | End: 2023-09-25

## 2023-09-14 ENCOUNTER — PATIENT MESSAGE (OUTPATIENT)
Dept: INFECTIOUS DISEASES | Facility: CLINIC | Age: 48
End: 2023-09-14
Payer: COMMERCIAL

## 2023-09-14 DIAGNOSIS — N52.9 ERECTILE DYSFUNCTION, UNSPECIFIED ERECTILE DYSFUNCTION TYPE: Primary | ICD-10-CM

## 2023-09-14 RX ORDER — SILDENAFIL 50 MG/1
50 TABLET, FILM COATED ORAL DAILY PRN
Qty: 30 TABLET | Refills: 3 | Status: SHIPPED | OUTPATIENT
Start: 2023-09-14 | End: 2023-12-08

## 2023-09-20 ENCOUNTER — PATIENT MESSAGE (OUTPATIENT)
Dept: FAMILY MEDICINE | Facility: CLINIC | Age: 48
End: 2023-09-20
Payer: COMMERCIAL

## 2023-09-21 RX ORDER — TRIAMCINOLONE ACETONIDE 1 MG/G
CREAM TOPICAL 2 TIMES DAILY
Qty: 45 G | Refills: 2 | Status: SHIPPED | OUTPATIENT
Start: 2023-09-21

## 2023-09-25 DIAGNOSIS — G47.00 INSOMNIA, UNSPECIFIED TYPE: ICD-10-CM

## 2023-09-25 RX ORDER — OMEPRAZOLE 40 MG/1
40 CAPSULE, DELAYED RELEASE ORAL EVERY MORNING
Qty: 90 CAPSULE | Refills: 3 | Status: SHIPPED | OUTPATIENT
Start: 2023-09-25

## 2023-09-25 RX ORDER — LORAZEPAM 0.5 MG/1
0.5 TABLET ORAL DAILY PRN
Qty: 90 TABLET | Refills: 1 | Status: SHIPPED | OUTPATIENT
Start: 2023-09-25

## 2023-09-25 RX ORDER — PRAVASTATIN SODIUM 10 MG/1
10 TABLET ORAL NIGHTLY
Qty: 90 TABLET | Refills: 3 | Status: SHIPPED | OUTPATIENT
Start: 2023-09-25

## 2023-10-02 DIAGNOSIS — B20 HUMAN IMMUNODEFICIENCY VIRUS (HIV) DISEASE: ICD-10-CM

## 2023-10-03 RX ORDER — ABACAVIR SULFATE, DOLUTEGRAVIR SODIUM, LAMIVUDINE 600; 50; 300 MG/1; MG/1; MG/1
1 TABLET, FILM COATED ORAL DAILY
Qty: 90 TABLET | Refills: 3 | Status: SHIPPED | OUTPATIENT
Start: 2023-10-03

## 2023-10-09 DIAGNOSIS — R76.8 HSV-2 SEROPOSITIVE: ICD-10-CM

## 2023-10-10 RX ORDER — VALACYCLOVIR HYDROCHLORIDE 500 MG/1
500 TABLET, FILM COATED ORAL 2 TIMES DAILY
Qty: 180 TABLET | Refills: 3 | Status: SHIPPED | OUTPATIENT
Start: 2023-10-10

## 2023-10-12 ENCOUNTER — PATIENT MESSAGE (OUTPATIENT)
Dept: FAMILY MEDICINE | Facility: CLINIC | Age: 48
End: 2023-10-12
Payer: COMMERCIAL

## 2023-10-12 DIAGNOSIS — Z79.899 LONG TERM USE OF DRUG: ICD-10-CM

## 2023-10-12 DIAGNOSIS — B20 HUMAN IMMUNODEFICIENCY VIRUS (HIV) DISEASE: ICD-10-CM

## 2023-10-12 DIAGNOSIS — Z00.00 PREVENTATIVE HEALTH CARE: Primary | ICD-10-CM

## 2023-10-12 DIAGNOSIS — A51.49 SECONDARY SYPHILIS: ICD-10-CM

## 2023-10-12 DIAGNOSIS — Z11.3 SCREEN FOR STD (SEXUALLY TRANSMITTED DISEASE): ICD-10-CM

## 2023-10-20 LAB
% CD4 (HELPER CELLS): 42 % (ref 30–61)
ABS NRBC COUNT: 0 X 10 3/UL (ref 0–0.01)
ABSOLUTE BASOPHIL: 0.04 X 10 3/UL (ref 0–0.22)
ABSOLUTE CD4 + CELLS: 948 CELLS/UL (ref 490–1740)
ABSOLUTE CD8+CELLS: 939 CELLS/UL (ref 180–1170)
ABSOLUTE EOSINOPHIL: 0.15 X 10 3/UL (ref 0.04–0.54)
ABSOLUTE IMMATURE GRAN: 0.01 X 10 3/UL (ref 0–0.04)
ABSOLUTE LYMPHOCYTE: 2.07 X 10 3/UL (ref 0.86–4.75)
ABSOLUTE LYMPHOCYTES: 2259 CELLS/UL (ref 850–3900)
ABSOLUTE MONOCYTE: 0.4 X 10 3/UL (ref 0.22–1.08)
ALBUMIN SERPL-MCNC: 4.5 G/DL (ref 3.5–5.2)
ALBUMIN/GLOB SERPL ELPH: 2.1 {RATIO} (ref 1–2.7)
ALP ISOS SERPL LEV INH-CCNC: 68 U/L (ref 40–130)
ALT (SGPT): 16 U/L (ref 0–41)
AMORPH URATE CRY URNS QL MICRO: ABNORMAL
ANION GAP SERPL CALC-SCNC: 11 MMOL/L (ref 8–17)
AST SERPL-CCNC: 18 U/L (ref 0–40)
BACTERIA #/AREA URNS HPF: ABNORMAL /[HPF]
BASOPHILS NFR BLD: 0.7 % (ref 0.2–1.2)
BILIRUB UR QL STRIP: NEGATIVE
BILIRUBIN, TOTAL: 0.18 MG/DL (ref 0–1.2)
BUN/CREAT SERPL: 16.8 (ref 6–20)
CALCIUM SERPL-MCNC: 9.3 MG/DL (ref 8.6–10.2)
CARBON DIOXIDE, CO2: 25 MMOL/L (ref 22–29)
CD4/CD8 RATIO: 1.01 (ref 0.86–5)
CD8 %: 42 % (ref 12–42)
CHLAMYDIA: NEGATIVE
CHLORIDE: 109 MMOL/L (ref 98–107)
CHOLEST SERPL-MSCNC: 180 MG/DL (ref 100–200)
CLARITY UR: CLEAR
COLOR UR: YELLOW
COPIES/ML: NOT DETECTED COPIES/ML
CREAT SERPL-MCNC: 1.45 MG/DL (ref 0.7–1.2)
EOSINOPHIL NFR BLD: 2.6 % (ref 0.7–7)
EPITHELIAL CELLS: NEGATIVE
ESTIMATED AVERAGE GLUCOSE: 125 MG/DL
GFR ESTIMATION: 59.07 ML/MIN/1.73M2
GLOBULIN: 2.1 G/DL (ref 1.5–4.5)
GLUCOSE (UA): NEGATIVE MG/DL
GLUCOSE: 113 MG/DL (ref 74–106)
GONORRHEA: NEGATIVE
HBA1C MFR BLD: 6 % (ref 4–6)
HCT VFR BLD AUTO: 41 % (ref 42–52)
HDLC SERPL-MCNC: 55 MG/DL
HGB BLD-MCNC: 13.9 G/DL (ref 14–18)
IMMATURE GRANULOCYTES: 0.2 % (ref 0–0.5)
KETONES UR QL STRIP: NEGATIVE MG/DL
LDL/HDL RATIO: 1.7 (ref 1–3)
LDLC SERPL CALC-MCNC: 91.2 MG/DL (ref 0–100)
LEUKOCYTE ESTERASE UR QL STRIP: ABNORMAL
LOG COPIES/ML: NOT DETECTED LOG COPIES/ML
LYMPHOCYTES NFR BLD: 35.3 % (ref 19.3–53.1)
MCH RBC QN AUTO: 33.1 PG (ref 27–32)
MCHC RBC AUTO-ENTMCNC: 33.9 G/DL (ref 32–36)
MCV RBC AUTO: 97.6 FL (ref 80–94)
MONOCYTES NFR BLD: 6.8 % (ref 4.7–12.5)
MUCOUS THREADS URNS QL MICRO: NEGATIVE
NEUTROPHILS # BLD AUTO: 3.2 X 10 3/UL (ref 2.15–7.56)
NEUTROPHILS NFR BLD: 54.4 % (ref 34–71.1)
NITRITE UR QL STRIP: NEGATIVE
NUCLEATED RED BLOOD CELLS: 0 /100 WBC (ref 0–0.2)
OCCULT BLOOD: NEGATIVE
PH, URINE: 6 (ref 5–7.5)
PLATELET # BLD AUTO: 148 X 10 3/UL (ref 135–400)
POTASSIUM: 4.1 MMOL/L (ref 3.5–5.1)
PROT SNV-MCNC: 6.6 G/DL (ref 6.4–8.3)
PROT UR QL STRIP: NEGATIVE MG/DL
RBC # BLD AUTO: 4.2 X 10 6/UL (ref 4.7–6.1)
RBC/HPF: NEGATIVE
RDW-SD: 44.7 FL (ref 37–54)
RPR REFLEX: NON REACTIVE
SODIUM: 145 MMOL/L (ref 136–145)
SOURCE: NORMAL
SP GR UR STRIP: 1.02 (ref 1–1.03)
SYPHILIS TREPONEMAL ANTIBODY: REACTIVE
T4, FREE: 1.11 NG/DL (ref 0.93–1.7)
TRIGL SERPL-MCNC: 169 MG/DL (ref 0–150)
TSH SERPL DL<=0.005 MIU/L-ACNC: 1.14 UIU/ML (ref 0.27–4.2)
UREA NITROGEN (BUN): 24.3 MG/DL (ref 6–20)
UROBILINOGEN, URINE: NORMAL E.U./DL (ref 0–1)
VITAMIN D (25OHD): 52.3 NG/ML
WBC # BLD: 5.87 X 10 3/UL (ref 4.3–10.8)
WBC/HPF: ABNORMAL

## 2023-10-30 PROBLEM — R73.03 PREDIABETES: Status: ACTIVE | Noted: 2023-10-30

## 2023-10-30 NOTE — PROGRESS NOTES
Infectious Diseases Clinic Note    Subjective:       Patient ID: Carl Lorenzo is a 47 y.o. male     Chief Complaint: Follow-up        Carl Lorenzo is a 44 y.o. male here today for 6 month f/u regarding HIV and syphilis     He is currently on Triumeq.  He is 100% compliant with meds. He has not missed any doses in the past month.  No side effects reported.  He had labs done prior to arrival and is here to discuss.  Denies opportunistic infections.  Denies fever chills myalgias lymphadenopathy.  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     Requesting Dermatology referral for skin lesions of he face.       No other issues reported at this time.                  Past Medical History:   Diagnosis Date    GERD (gastroesophageal reflux disease)     HIV (human immunodeficiency virus infection) 01/01/2004    Hypertension        Social History     Socioeconomic History    Marital status:    Tobacco Use    Smoking status: Never    Smokeless tobacco: Never   Substance and Sexual Activity    Alcohol use: Yes         Current Outpatient Medications:     abacavir-dolutegravir-lamivud (TRIUMEQ) 600- mg Tab, Take 1 tablet by mouth once daily., Disp: 90 tablet, Rfl: 3    abacavir-dolutegravir-lamivud (TRIUMEQ) 600- mg Tab, Take 1 tablet by mouth once daily., Disp: 90 tablet, Rfl: 3    ascorbic acid, vitamin C, (VITAMIN C) 100 MG tablet, Take 100 mg by mouth once daily., Disp: , Rfl:     b complex vitamins capsule, Take 1 capsule by mouth once daily., Disp: , Rfl:     LORazepam (ATIVAN) 0.5 MG tablet, Take 1 tablet (0.5 mg total) by mouth daily as needed for Anxiety., Disp: 90 tablet, Rfl: 1    omeprazole (PRILOSEC) 40 MG capsule, Take 1 capsule (40 mg total) by mouth every morning., Disp: 90 capsule, Rfl: 3    pravastatin (PRAVACHOL) 10 MG tablet, Take 1 tablet (10 mg total) by mouth every evening., Disp: 90 tablet, Rfl: 3    sildenafiL (VIAGRA) 50 MG tablet, Take 1 tablet (50 mg total) by mouth  daily as needed for Erectile Dysfunction., Disp: 30 tablet, Rfl: 3    sod sulf-pot chloride-mag sulf (SUTAB) 1.479-0.188- 0.225 gram tablet, Take 12 tablets by mouth once daily. Take according to package instructions with indicated amount of water., Disp: 24 tablet, Rfl: 0    triamcinolone acetonide 0.1% (KENALOG) 0.1 % cream, Apply topically 2 (two) times daily. Apply to affected area, Disp: 45 g, Rfl: 2    valACYclovir (VALTREX) 500 MG tablet, TAKE 1 TABLET BY MOUTH TWICE A DAY, Disp: 180 tablet, Rfl: 3    vitamin E 100 UNIT capsule, Take 100 Units by mouth once daily., Disp: , Rfl:     hepatitis A and B vaccine, PF, (TWINRIX) 720 NACHO unit- 20 mcg/mL Syrg suspension, 1 mL given IM on a 0-, 1-, and 6-month schedule for a total of 3 doses, Disp: 1 mL, Rfl: 2    pneumoc 20-silvino conj-dip cr,PF, (PREVNAR-20, PF,) 0.5 mL Syrg injection, Inject 0.5 mLs into the muscle once. for 1 dose, Disp: 0.5 mL, Rfl: 0  No current facility-administered medications for this visit.    Review of Systems   Constitutional:  Negative for activity change, chills, diaphoresis, fatigue and fever.   HENT:  Negative for ear pain, mouth sores, nosebleeds and trouble swallowing.    Eyes:  Negative for pain and visual disturbance.   Respiratory:  Negative for cough, chest tightness and shortness of breath.    Cardiovascular:  Negative for chest pain, palpitations and leg swelling.   Gastrointestinal:  Negative for abdominal distention, abdominal pain and blood in stool.   Endocrine: Negative for polydipsia, polyphagia and polyuria.   Genitourinary:  Negative for flank pain, frequency, genital sores, penile discharge and testicular pain.   Musculoskeletal:  Negative for gait problem, joint swelling, neck pain and neck stiffness.   Skin:  Negative for color change, pallor and rash.   Allergic/Immunologic: Negative for immunocompromised state.   Neurological:  Negative for dizziness, syncope, light-headedness and headaches.   Hematological:   Negative for adenopathy. Does not bruise/bleed easily.   Psychiatric/Behavioral:  Negative for confusion, dysphoric mood, hallucinations, self-injury, sleep disturbance and suicidal ideas. The patient is not nervous/anxious and is not hyperactive.            Objective:      Vitals:    10/31/23 0814   BP: 136/80   Pulse: (!) 52     Physical Exam  Vitals and nursing note reviewed.   Constitutional:       General: He is not in acute distress.     Appearance: Normal appearance. He is well-developed and normal weight. He is not ill-appearing.   HENT:      Head: Normocephalic and atraumatic.      Right Ear: External ear normal.      Left Ear: External ear normal.      Nose: Nose normal.      Mouth/Throat:      Mouth: Mucous membranes are moist.   Eyes:      General: No scleral icterus.     Pupils: Pupils are equal, round, and reactive to light.   Neck:      Vascular: No carotid bruit or JVD.      Trachea: No tracheal deviation.   Cardiovascular:      Rate and Rhythm: Normal rate and regular rhythm.      Pulses: Normal pulses.      Heart sounds: Normal heart sounds. No murmur heard.     No gallop.   Pulmonary:      Effort: Pulmonary effort is normal. No respiratory distress.      Breath sounds: Normal breath sounds. No wheezing or rales.   Abdominal:      General: Abdomen is flat. Bowel sounds are normal.   Musculoskeletal:         General: No tenderness. Normal range of motion.      Cervical back: Neck supple.   Skin:     General: Skin is warm and dry.      Coloration: Skin is not jaundiced.      Findings: No erythema or rash.   Neurological:      Mental Status: He is alert and oriented to person, place, and time. Mental status is at baseline.   Psychiatric:         Attention and Perception: Attention and perception normal.         Mood and Affect: Mood and affect normal.         Speech: Speech normal.         Behavior: Behavior normal. Behavior is cooperative.         Thought Content: Thought content normal.          Cognition and Memory: Cognition and memory normal.         Judgment: Judgment normal.             Assessment/Plan:       1. Human immunodeficiency virus (HIV) disease    2. Hyperlipidemia, unspecified hyperlipidemia type    3. Prediabetes    4. Renal insufficiency    5. Secondary syphilis    6. Skin lesion    7. Need for hepatitis B vaccination    8. Pneumococcal vaccination indicated    9. Flu vaccine need    10. Screening for colorectal cancer    11. Encounter to discuss test results    12. Macrocytic anemia      Problem List Items Addressed This Visit          Cardiac/Vascular    Hyperlipidemia    Current Assessment & Plan       LIPID II PROFILE    CHOLESTEROL 180 100-200 mg/dL  Desirable  <200  Borderline 200-240  High risk  >240    TRIGLYCERIDES 169 H 0-150 mg/dL    HDL 55 L >60 mg/dL  <40 mg/dL Major risk factor for CHD  >60 mg/dL Negative risk factor for CHD    LDL 91.2 0-100 mg/dL    RISK LDL/HDL RATIO 1.7      Continue current meds.          Relevant Orders    Basic Metabolic Panel       ID    Human immunodeficiency virus (HIV) disease - Primary    Current Assessment & Plan       HIV-1 RNA, Quantitative, Real-Time PCR IG    HIV-1 RNA, QN PCR NOT DETECTED NOT DETECTED copies/mL    HIV-1 RNA, QN PCR NOT DETECTED NOT DETECTED Log copies/mL       LYMPHOCYTE SUBSET PANEL 4 IG    % CD4 (HELPER CELLS) 42 30-61 %    Absolute CD4+ Cells 829 217-3383 cells/uL    %CD8 SUPPRESSOR T CELL IG42 12-42 %    Absolute CD8+ Cells  180-1170 cells/uL    CD4/CD8 Ratio IG1.01 0.86-5.00    Absolute Lymphocytes [id number] 850-3900 cells/uL      PLAN     1. Continue current therapy w/ Triumeq.   2. Hep B Surf Antibody indeterminate - pt given Twinrix vaccine during his last encounter.               Relevant Medications    hepatitis A and B vaccine, PF, (TWINRIX) 720 NACHO unit- 20 mcg/mL Syrg suspension    Other Relevant Orders    Basic Metabolic Panel    Secondary syphilis    Current Assessment & Plan       RPR REFLEX NON REACTIVE  NON REACTIVE    CHLAMYDIA AND GONORRHEA AMP.    SOURCE URINE    CHLAMYDIA NEGATIVE NEGATIVE      GONORRHEA NEGATIVE NEGATIVE      PLAN    1. Treatment successful. RPR titer nonreactive         Relevant Orders    Basic Metabolic Panel       Endocrine    Prediabetes    Current Assessment & Plan       HEMOGLOBIN A1C 6.0 4.0-6.0 %    ESTIMATED GLUCOSE 125 H NORMAL MG/DL      GLUCOSE 113 H  mg/dL      PLAN    1. See AVS for diet mods.          Relevant Orders    Basic Metabolic Panel     Other Visit Diagnoses       Renal insufficiency        BUN 24, Creat 1.45, GFR 59 - will repeat BMP in 6 weeks.     Relevant Orders    Basic Metabolic Panel    Skin lesion        Refer to Derm     Relevant Orders    Ambulatory referral/consult to Dermatology    Basic Metabolic Panel    Need for hepatitis B vaccination        Relevant Medications    hepatitis A and B vaccine, PF, (TWINRIX) 720 NACHO unit- 20 mcg/mL Syrg suspension    Other Relevant Orders    Basic Metabolic Panel    Pneumococcal vaccination indicated        Relevant Medications    pneumoc 20-silvino conj-dip cr,PF, (PREVNAR-20, PF,) 0.5 mL Syrg injection    Flu vaccine need        Relevant Medications    influenza (QUADRIVALENT PF) vaccine 0.5 mL (Completed) (Start on 10/31/2023  9:25 AM)    Screening for colorectal cancer        Relevant Orders    Ambulatory referral/consult to Gastroenterology    Encounter to discuss test results        Macrocytic anemia        Referred to GI for colonoscopy.            Patient Message on 10/12/2023   Component Date Value Ref Range Status    VITAMIN D (25OHD) 10/18/2023 52.3  >30 ng/mL Final    Comment: Expected values:    Deficient                 <20 ng/mL  Insufficient              21-29 ng/mL  Sufficient                >30 ng/mL  NOTE  Testing performed at:  [facility], [address] [place] [address] CLIA #:[id number]      Color, UA 10/18/2023 YELLOW   Final    Clarity, UA 10/18/2023 CLEAR   Final    Specific  Gravity,UA 10/18/2023 1.020  1.005 - 1.030 Final    pH, Urine 10/18/2023 6  5 - 7.5 Final    Leukocytes, UA 10/18/2023 SMALL (A)  NEGATIVE Final    Nitrite, Urine 10/18/2023 NEGATIVE  NEGATIVE Final    Protein, UA 10/18/2023 NEGATIVE  NEGATIVE mg/dL Final    Glucose, UA 10/18/2023 NEGATIVE  NEGATIVE mg/dL Final    Ketones, UA 10/18/2023 NEGATIVE  NEGATIVE mg/dL Final    Urobilinogen, urine 10/18/2023 NORMAL  0 - 1.0 E.U./dL Final    Bilirubin (UA) 10/18/2023 NEGATIVE  NEGATIVE Final    Occult Blood 10/18/2023 NEGATIVE  NEGATIVE Final    WBC/HPF 10/18/2023 RARE  <5 Final    RBC/HPF 10/18/2023 NEGATIVE  <5 Final    Amorphous, UA 10/18/2023 1+   Final    Bacteria, UA 10/18/2023 1+ (A)  NEG-TRACE Final    Epithelial Cells 10/18/2023 NEGATIVE  NEGATIVE-FEW Final    Mucus, UA 10/18/2023 NEGATIVE  NEGATIVE Final    Comment: NOTE  Testing performed at:  [facility], [address] [place] [address] CLIA #:[id number]      Cholesterol 10/18/2023 180  100 - 200 mg/dL Final    Comment: Desirable  <200  Borderline 200-240  High risk  >240      Triglycerides 10/18/2023 169 (H)  0 - 150 mg/dL Final    HDL 10/18/2023 55 (L)  >60 mg/dL Final    Comment: <40 mg/dL Major risk factor for CHD  >60 mg/dL Negative risk factor for CHD      LDL Cholesterol 10/18/2023 91.2  0 - 100 mg/dL Final    LDL/HDL Ratio 10/18/2023 1.7  1 - 3 Final    Comment: NOTE  Testing performed at:  [facility], [address] [place] [address] CLIA #:[id number]      Hemoglobin A1C 10/18/2023 6.0  4.0 - 6.0 % Final    EST AVERAGE GLUCOSE 10/18/2023 125 (H)  NORMAL MG/DL Final    Comment: NOTE  Testing performed at:  [facility], [address] [place] [address]  [address] CLIA #:[id number]      Glucose 10/18/2023 113 (H)  74 - 106 mg/dL Final    BUN 10/18/2023 24.3 (H)  6 - 20 mg/dL Final    Creatinine 10/18/2023 1.45 (H)  0.70 - 1.20 mg/dL Final    Recommend repeat creatinine within 90 days.    AST 10/18/2023 18  0 -  40 U/L Final    ALT (SGPT) 10/18/2023 16  0 - 41 U/L Final    Alkaline Phosphatase 10/18/2023 68  40 - 130 U/L Final    Calcium 10/18/2023 9.3  8.6 - 10.2 mg/dL Final    Protein, Total 10/18/2023 6.6  6.4 - 8.3 g/dL Final    Albumin 10/18/2023 4.5  3.5 - 5.2 g/dL Final    BILIRUBIN, TOTAL 10/18/2023 0.18  0.00 - 1.20 mg/dL Final    Sodium 10/18/2023 145  136 - 145 mmol/L Final    Potassium 10/18/2023 4.1  3.5 - 5.1 mmol/L Final    Chloride 10/18/2023 109 (H)  98 - 107 mmol/L Final    CO2 10/18/2023 25  22 - 29 mmol/L Final    Globulin 10/18/2023 2.1  1.5 - 4.5 g/dL Final    Albumin/Globulin Ratio 10/18/2023 2.1  1.0 - 2.7 Final    BUN/Creatinine Ratio 10/18/2023 16.8  6 - 20 Final    GFR ESTIMATION 10/18/2023 59.07  >60.00 mL/min/1.73m2 Final    Anion Gap 10/18/2023 11.0  8.0 - 17.0 mmol/L Final    Comment: NOTE  Testing performed at:  [facility], [address] [place] [address] CLIA #:[id number]      WBC 10/18/2023 5.87  4.3 - 10.8 X 10 3/ul Final    RBC 10/18/2023 4.20 (L)  4.7 - 6.1 X 10 6/ul Final    RDW-SD 10/18/2023 44.7  37 - 54 fl Final    Hemoglobin 10/18/2023 13.9 (L)  14 - 18 g/dL Final    Hematocrit 10/18/2023 41.0 (L)  42 - 52 % Final    MCV 10/18/2023 97.6 (H)  80 - 94 fl Final    MCH 10/18/2023 33.1 (H)  27 - 32 pg Final    MCHC 10/18/2023 33.9  32 - 36 g/dL Final    Platelets 10/18/2023 148  135 - 400 X 10 3/ul Final    Neutrophils 10/18/2023 54.4  34 - 71.1 % Final    Lymphocytes 10/18/2023 35.3  19.3 - 53.1 % Final    Monocytes 10/18/2023 6.8  4.7 - 12.5 % Final    Eosinophils 10/18/2023 2.6  0.7 - 7.0 % Final    Basophils 10/18/2023 0.7  0.2 - 1.2 % Final    Neutrophils Absolute 10/18/2023 3.20  2.15 - 7.56 X 10 3/ul Final    Lymphocytes Absolute 10/18/2023 2.07  0.86 - 4.75 X 10 3/ul Final    Monocytes Absolute 10/18/2023 0.40  0.22 - 1.08 X 10 3/ul Final    Eosinophils Absolute 10/18/2023 0.15  0.04 - 0.54 X 10 3/ul Final    Basophils Absolute 10/18/2023 0.04  0.00 - 0.22 X  10 3/ul Final    Immature Granulocytes Absolute 10/18/2023 0.01  0 - 0.04 X 10 3/ul Final    Immature Granulocytes 10/18/2023 0.2  0 - 0.5 % Final    IG includes metamyelocytes, myelocytes, and promyelocytes    nRBC# 10/18/2023 0.0  0 - 0.2 /100 WBC Final    nRBC Count Absolute 10/18/2023 0.000  0 - 0.012 x 10 3/ul Final    Comment: NOTE  Testing performed at:  [facility], [address] [place] [address]  [address] CLIA #:[id number]      Copies/mL 10/18/2023 NOT DETECTED  NOT DETECTED copies/mL Final    Log copies/mL 10/18/2023 NOT DETECTED  NOT DETECTED Log copies/mL Final    Comment:   This test was performed using Real-Time Polymerase Chain  Reaction.    Reportable Range: 20 copies/mL to 10,000,000 copies/mL  (1.30 log copies/mL to 7.00 log copies/mL).  NOTE  Testing performed at:  [facility], [address] [place] [address] CLIA #: [id number]      % CD4 (Paxton Cells) 10/18/2023 42  30 - 61 % Final    Absolute CD4 + Cells 10/18/2023 948  490 - 1,740 cells/uL Final    CD8 % 10/18/2023 42  12 - 42 % Final    Absolute CD8+Cells 10/18/2023 939  180 - 1,170 cells/uL Final    CD4/CD8 Ratio 10/18/2023 1.01  0.86 - 5.00 Final    Absolute Lymphocytes 10/18/2023 2,259  850 - 3,900 cells/uL Final    Comment: NOTE  Testing performed at:  [facility], [address] [place] [address], [place] [address] CLIA #: [id number]      Syphilis Treponemal Ab 10/18/2023 REACTIVE (A)  NONREACTIVE Final    Comment: Reactive treponemal test results alone are not diagnostic of  syphilis.  Treponemal test results should always be interpreted in conjunction  with additional treponemal or nontreponemal serologic test results,  medical history, and clinical presentation as recommended by the CDC.  Therefore an RPR has been added to this sample to aid in the  diagnosis.  This immunoassay is for the in vitro detection of total antibodies  (IgG and IgM) to Treponema pallidum in human serum.  NOTE  Testing  performed at:  [facility], [address] [place] [address]  [address] CLIA #:[id number]      Source 10/18/2023 URINE   Final    Chlamydia 10/18/2023 NEGATIVE  NEGATIVE Final    Comment: Testing performed using GenProbe APTIMA COMBO 2 Assay which utilizes  a target amplification nucleic acid probe.      Gonorrhea 10/18/2023 NEGATIVE  NEGATIVE Final    Comment: Testing performed using GenProbe APTIMA COMBO 2 Assay which utilizes  a target amplification nucleic acid probe.  NOTE  Testing performed at:  [facility], [address] [place] [address]  [address] CLIA #:[id number]      RPR Reflex 10/18/2023 NON REACTIVE  NON REACTIVE Final    Comment: Confirmatory TP-PA test is being sent out per CDC guidelines. Do not  interpret until TP-PA test is resulted.  This is being performed as a reflex for a reactive treponemal Ab test.  NOTE  Testing performed at:  [facility], [address] [place] [address]  [address] CLIA #:[id number]      TSH 10/18/2023 1.14  0.27 - 4.20 uIU/mL Final    Comment: NOTE  Testing performed at:  [facility], [address] [place] [address]  [address] CLIA #:[id number]      T4, Free 10/18/2023 1.11  0.93 - 1.70 ng/dL Final    Comment: NOTE  Testing performed at:  [facility], [address] [place] [address]  [address] CLIA #:[id number]        No results found in the last 30 days.      Duration of encounter: 30 minutes  This includes face-to-face time and non face-to-face time preparing to see the patient (eg, review of tests), obtaining and/or reviewing separately obtained history, documenting clinical information in the electronic or other health record, independently interpreting resultsand communicating results to the patient/family/caregiver, or care coordination.      All diagnostic data (labs/imaging) was reviewed with the patient and/or family member in the room.  All questions were answered to their liking. The patient and/or family member voiced  "understanding of all instructions provided. Expectations regarding follow up and treatment plan were voiced and confirmed prior to departure. The patient was given orders/instructions at the end of the visit for reference. They were instructed to notify my office if they have not been contacted for imaging/referrals/labs/results in 1-2 weeks. They voiced understanding of all of the above.     Follow up:     Patient Instructions   Diabetes Exchange Diet   About this topic   This diet plan will help you control your blood sugar level. This may also help you have balanced nutrients in your meal. Foods are divided into groups. The groups include starch, fruit, milk, vegetables, meat and meat substitutes, and fat. A serving of food in a group has about the same nutrition as another food in that group. That means that each of the foods have about the same amount of carbohydrates (carbs), protein, fat, and calories as the other. These foods can be exchanged for each other without changing your blood sugar.       What will the results be?   You can manage your blood sugar level if you follow this diet. Ask your doctor if you should be checking your blood sugar. Write down your readings and the foods you eat.  What foods are good to eat?   This list is based on the Academy of Nutrition and Dietetics and American Diabetes Association food exchange list.  Starch or bread: One starch exchange has 15 grams of carbs and 80 calories per serving. One serving is equal to:  1 slice of bread (white, whole wheat, rye)  2 slices of reduced calorie or "lite" bread  1/4 (1 ounce/30 grams) bagel  1/2 English muffin  1/2 hamburger bun  3/4 cup (180 grams) cold cereal  1/3 cup (80 grams) cooked brown or white rice  1/2 cup (120 grams) cooked legumes, like dried beans, peas, or lentils  1/3 cup (80 grams) cooked pasta  1/2 cup (120 grams) cup bulgur  1/2 cup (120 grams) corn, sweet potato, or peas  3 ounces (180 grams) baked sweet or white " potato  3/4 ounce (24 grams) pretzels  3 cups (720 grams) microwave or hot air popped popcorn (80% light)  Meat and meat substitutes:   Lean protein: This contains 45 calories, 7 grams of protein, and 2 grams of fat per serving. One serving is equal to:  1 ounce (30 grams) poultry, skin removed; chicken, [place] hen, turkey, lean ground turkey or chicken  1 ounce (30 grams) lean beef (flank steak, tenderloin, ground beef with 90% or higher lean/10% or lower fat)*  1 ounce (30 grams) veal, roast or lean chop*  1 ounce (30 grams) lamb, roast or lean chop*  1 ounce (30 grams) pork tenderloin or fresh ham*  1 ounce (30 grams) low-fat cheese (3 grams or less of fat per oz)  1 ounce (30 grams) low-fat luncheon meats (3 grams or less of fat per oz)  1/4 cup cottage cheese  1 ounce (30 grams) salmon, catfish, cod, flounder, tilapia, herring  1 ounce (30 grams) tuna in water, canned  2 small sardines  1 ounce (30 grams) shellfish (clams, scallop, lobster, shrimp)  2 egg whites  1/2 cup (60 grams) egg substitute  1/2 cup (120 grams) cooked beans (black beans, kidney, chickpeas, or lentils)  *Limit to 1 to 2 times per week.  Medium-fat protein: This contains 75 calories, 7 grams of protein, and 5 grams of fat per serving. One serving is equal to:  1 ounce (30 grams) beef (any prime cut), corned beef, ground beef (85% or lower lean/15% or higher fat)**  1 ounce (30 grams) pork chop; cutlet, ground, or shoulder roast  1 ounce (30 grams) poultry with skin  1 ounce (30 grams) fish; any fried  1 whole egg (medium)**  1 ounce (30 grams) mozzarella cheese  1/4 cup (60 grams) ricotta cheese  4 ounces (120 grams) tofu (note that this is a heart healthy choice)  **Choose these rarely.  Non-starchy Vegetables: One vegetable exchange has 25 calories, 2 grams of protein, and 5 grams of carbs per serving. One serving is equal to:  1/2 cup (120 grams) cooked vegetables (carrots, broccoli, zucchini, cabbage, asparagus)  1 cup (240 grams) raw  vegetables or salad greens  1/2 cup (120 mL) vegetable juice  Fruits: One fruit exchange has 15 grams of carbs and 60 calories. One serving is equal to:  1 small apple  1 medium orange or nectarine  1 extra-small banana  1 medium fresh peach  1/2 cup sliced kiwi  1/2 grapefruit  1/2 claudia  1 cup (240 grams) fresh berries (blackberries or raspberries)  1 cup (240 grams) fresh melon (honeydew or cantaloupe) cubes  4 ounces (120 grams) unsweetened juice  Fat-free and low-fat milk: One fat-free or low-fat milk exchange has 100 calories, 12 grams of carbs, and 8 grams of protein per serving. One serving is equal to:  1 cup (240 mL) milk, fat-free or 1% fat  2/3 cup (160 mL) yogurt, plain or artificially sweetened nonfat or low-fat  Fats: One fat exchange has 45 calories and 5 grams of fat per serving. One serving is equal to:  1 teaspoon (5 mL) oil (vegetable, corn, canola, olive)  1 teaspoon (5 grams) butter  1 teaspoon (5 grams) stick margarine  1 teaspoon (5 grams) mayonnaise  1 tablespoon (15 grams) reduced-fat margarine or mayonnaise  1 tablespoon (15 mL) salad dressing  1 tablespoon (15 grams) cream cheese  1 1/2 tablespoons (22.5 grams) reduced-fat cream cheese  1/8 avocado  8 large black olives  10 large stuffed green olives  1 slice pina  Will there be any other care needed?   Your doctor may ask you to make visits to the office to check on your progress. Be sure to keep these visits. If you have been writing down your blood sugar readings, bring it to your visit.  When do I need to call the doctor?   Talk to your doctor or dietitian. They can help you if your favorite food is not on this list. They will also be able to help you with food allergies. Ask them to help you make your own food plan.  Last Reviewed Date   2021-03-29  Consumer Information Use and Disclaimer   This information is not specific medical advice and does not replace information you receive from your health care provider. This is only a  brief summary of general information. It does NOT include all information about conditions, illnesses, injuries, tests, procedures, treatments, therapies, discharge instructions or life-style choices that may apply to you. You must talk with your health care provider for complete information about your health and treatment options. This information should not be used to decide whether or not to accept your health care providers advice, instructions or recommendations. Only your health care provider has the knowledge and training to provide advice that is right for you.  Copyright      No follow-ups on file.

## 2023-10-30 NOTE — ASSESSMENT & PLAN NOTE
RPR REFLEX NON REACTIVE NON REACTIVE    CHLAMYDIA AND GONORRHEA AMP.    SOURCE URINE    CHLAMYDIA NEGATIVE NEGATIVE      GONORRHEA NEGATIVE NEGATIVE      PLAN    1. Treatment successful. RPR titer nonreactive

## 2023-10-30 NOTE — ASSESSMENT & PLAN NOTE
LIPID II PROFILE    CHOLESTEROL 180 100-200 mg/dL  Desirable  <200  Borderline 200-240  High risk  >240    TRIGLYCERIDES 169 H 0-150 mg/dL    HDL 55 L >60 mg/dL  <40 mg/dL Major risk factor for CHD  >60 mg/dL Negative risk factor for CHD    LDL 91.2 0-100 mg/dL    RISK LDL/HDL RATIO 1.7      Continue current meds.

## 2023-10-30 NOTE — ASSESSMENT & PLAN NOTE
HIV-1 RNA, Quantitative, Real-Time PCR IG    HIV-1 RNA, QN PCR NOT DETECTED NOT DETECTED copies/mL    HIV-1 RNA, QN PCR NOT DETECTED NOT DETECTED Log copies/mL       LYMPHOCYTE SUBSET PANEL 4 IG    % CD4 (HELPER CELLS) 42 30-61 %    Absolute CD4+ Cells 207 984-7001 cells/uL    %CD8 SUPPRESSOR T CELL IG42 12-42 %    Absolute CD8+ Cells  180-1170 cells/uL    CD4/CD8 Ratio IG1.01 0.86-5.00    Absolute Lymphocytes [id number] 850-3900 cells/uL      PLAN     1. Continue current therapy w/ Triumeq.   2. Hep B Surf Antibody indeterminate - pt given Twinrix vaccine during his last encounter.

## 2023-10-30 NOTE — PATIENT INSTRUCTIONS
"Diabetes Exchange Diet   About this topic   This diet plan will help you control your blood sugar level. This may also help you have balanced nutrients in your meal. Foods are divided into groups. The groups include starch, fruit, milk, vegetables, meat and meat substitutes, and fat. A serving of food in a group has about the same nutrition as another food in that group. That means that each of the foods have about the same amount of carbohydrates (carbs), protein, fat, and calories as the other. These foods can be exchanged for each other without changing your blood sugar.       What will the results be?   You can manage your blood sugar level if you follow this diet. Ask your doctor if you should be checking your blood sugar. Write down your readings and the foods you eat.  What foods are good to eat?   This list is based on the Academy of Nutrition and Dietetics and American Diabetes Association food exchange list.  Starch or bread: One starch exchange has 15 grams of carbs and 80 calories per serving. One serving is equal to:  1 slice of bread (white, whole wheat, rye)  2 slices of reduced calorie or "lite" bread  1/4 (1 ounce/30 grams) bagel  1/2 English muffin  1/2 hamburger bun  3/4 cup (180 grams) cold cereal  1/3 cup (80 grams) cooked brown or white rice  1/2 cup (120 grams) cooked legumes, like dried beans, peas, or lentils  1/3 cup (80 grams) cooked pasta  1/2 cup (120 grams) cup bulgur  1/2 cup (120 grams) corn, sweet potato, or peas  3 ounces (180 grams) baked sweet or white potato  3/4 ounce (24 grams) pretzels  3 cups (720 grams) microwave or hot air popped popcorn (80% light)  Meat and meat substitutes:   Lean protein: This contains 45 calories, 7 grams of protein, and 2 grams of fat per serving. One serving is equal to:  1 ounce (30 grams) poultry, skin removed; chicken, [place] hen, turkey, lean ground turkey or chicken  1 ounce (30 grams) lean beef (flank steak, tenderloin, ground beef with 90% or " higher lean/10% or lower fat)*  1 ounce (30 grams) veal, roast or lean chop*  1 ounce (30 grams) lamb, roast or lean chop*  1 ounce (30 grams) pork tenderloin or fresh ham*  1 ounce (30 grams) low-fat cheese (3 grams or less of fat per oz)  1 ounce (30 grams) low-fat luncheon meats (3 grams or less of fat per oz)  1/4 cup cottage cheese  1 ounce (30 grams) salmon, catfish, cod, flounder, tilapia, herring  1 ounce (30 grams) tuna in water, canned  2 small sardines  1 ounce (30 grams) shellfish (clams, scallop, lobster, shrimp)  2 egg whites  1/2 cup (60 grams) egg substitute  1/2 cup (120 grams) cooked beans (black beans, kidney, chickpeas, or lentils)  *Limit to 1 to 2 times per week.  Medium-fat protein: This contains 75 calories, 7 grams of protein, and 5 grams of fat per serving. One serving is equal to:  1 ounce (30 grams) beef (any prime cut), corned beef, ground beef (85% or lower lean/15% or higher fat)**  1 ounce (30 grams) pork chop; cutlet, ground, or shoulder roast  1 ounce (30 grams) poultry with skin  1 ounce (30 grams) fish; any fried  1 whole egg (medium)**  1 ounce (30 grams) mozzarella cheese  1/4 cup (60 grams) ricotta cheese  4 ounces (120 grams) tofu (note that this is a heart healthy choice)  **Choose these rarely.  Non-starchy Vegetables: One vegetable exchange has 25 calories, 2 grams of protein, and 5 grams of carbs per serving. One serving is equal to:  1/2 cup (120 grams) cooked vegetables (carrots, broccoli, zucchini, cabbage, asparagus)  1 cup (240 grams) raw vegetables or salad greens  1/2 cup (120 mL) vegetable juice  Fruits: One fruit exchange has 15 grams of carbs and 60 calories. One serving is equal to:  1 small apple  1 medium orange or nectarine  1 extra-small banana  1 medium fresh peach  1/2 cup sliced kiwi  1/2 grapefruit  1/2 claudia  1 cup (240 grams) fresh berries (blackberries or raspberries)  1 cup (240 grams) fresh melon (honeydew or cantaloupe) cubes  4 ounces (120 grams)  unsweetened juice  Fat-free and low-fat milk: One fat-free or low-fat milk exchange has 100 calories, 12 grams of carbs, and 8 grams of protein per serving. One serving is equal to:  1 cup (240 mL) milk, fat-free or 1% fat  2/3 cup (160 mL) yogurt, plain or artificially sweetened nonfat or low-fat  Fats: One fat exchange has 45 calories and 5 grams of fat per serving. One serving is equal to:  1 teaspoon (5 mL) oil (vegetable, corn, canola, olive)  1 teaspoon (5 grams) butter  1 teaspoon (5 grams) stick margarine  1 teaspoon (5 grams) mayonnaise  1 tablespoon (15 grams) reduced-fat margarine or mayonnaise  1 tablespoon (15 mL) salad dressing  1 tablespoon (15 grams) cream cheese  1 1/2 tablespoons (22.5 grams) reduced-fat cream cheese  1/8 avocado  8 large black olives  10 large stuffed green olives  1 slice pina  Will there be any other care needed?   Your doctor may ask you to make visits to the office to check on your progress. Be sure to keep these visits. If you have been writing down your blood sugar readings, bring it to your visit.  When do I need to call the doctor?   Talk to your doctor or dietitian. They can help you if your favorite food is not on this list. They will also be able to help you with food allergies. Ask them to help you make your own food plan.  Last Reviewed Date   2021-03-29  Consumer Information Use and Disclaimer   This information is not specific medical advice and does not replace information you receive from your health care provider. This is only a brief summary of general information. It does NOT include all information about conditions, illnesses, injuries, tests, procedures, treatments, therapies, discharge instructions or life-style choices that may apply to you. You must talk with your health care provider for complete information about your health and treatment options. This information should not be used to decide whether or not to accept your health care providers advice,  instructions or recommendations. Only your health care provider has the knowledge and training to provide advice that is right for you.  Copyright

## 2023-10-30 NOTE — ASSESSMENT & PLAN NOTE
HEMOGLOBIN A1C 6.0 4.0-6.0 %    ESTIMATED GLUCOSE 125 H NORMAL MG/DL      GLUCOSE 113 H  mg/dL      PLAN    1. See AVS for diet mods.

## 2023-10-31 ENCOUNTER — OFFICE VISIT (OUTPATIENT)
Dept: FAMILY MEDICINE | Facility: CLINIC | Age: 48
End: 2023-10-31
Payer: COMMERCIAL

## 2023-10-31 ENCOUNTER — PATIENT MESSAGE (OUTPATIENT)
Dept: FAMILY MEDICINE | Facility: CLINIC | Age: 48
End: 2023-10-31

## 2023-10-31 VITALS
OXYGEN SATURATION: 98 % | BODY MASS INDEX: 27.58 KG/M2 | HEART RATE: 52 BPM | WEIGHT: 197 LBS | HEIGHT: 71 IN | SYSTOLIC BLOOD PRESSURE: 136 MMHG | DIASTOLIC BLOOD PRESSURE: 80 MMHG

## 2023-10-31 DIAGNOSIS — Z12.12 SCREENING FOR COLORECTAL CANCER: ICD-10-CM

## 2023-10-31 DIAGNOSIS — R73.03 PREDIABETES: Primary | ICD-10-CM

## 2023-10-31 DIAGNOSIS — Z23 FLU VACCINE NEED: ICD-10-CM

## 2023-10-31 DIAGNOSIS — B20 HUMAN IMMUNODEFICIENCY VIRUS (HIV) DISEASE: Primary | ICD-10-CM

## 2023-10-31 DIAGNOSIS — Z12.11 SCREENING FOR COLORECTAL CANCER: ICD-10-CM

## 2023-10-31 DIAGNOSIS — R73.03 PREDIABETES: ICD-10-CM

## 2023-10-31 DIAGNOSIS — L98.9 SKIN LESION: ICD-10-CM

## 2023-10-31 DIAGNOSIS — D53.9 MACROCYTIC ANEMIA: ICD-10-CM

## 2023-10-31 DIAGNOSIS — E78.5 HYPERLIPIDEMIA, UNSPECIFIED HYPERLIPIDEMIA TYPE: ICD-10-CM

## 2023-10-31 DIAGNOSIS — Z91.89 PNEUMOCOCCAL VACCINATION INDICATED: ICD-10-CM

## 2023-10-31 DIAGNOSIS — A51.49 SECONDARY SYPHILIS: ICD-10-CM

## 2023-10-31 DIAGNOSIS — N28.9 RENAL INSUFFICIENCY: ICD-10-CM

## 2023-10-31 DIAGNOSIS — Z71.2 ENCOUNTER TO DISCUSS TEST RESULTS: ICD-10-CM

## 2023-10-31 DIAGNOSIS — Z23 NEED FOR HEPATITIS B VACCINATION: ICD-10-CM

## 2023-10-31 PROCEDURE — 1159F PR MEDICATION LIST DOCUMENTED IN MEDICAL RECORD: ICD-10-PCS | Mod: CPTII,S$GLB,, | Performed by: NURSE PRACTITIONER

## 2023-10-31 PROCEDURE — 3079F DIAST BP 80-89 MM HG: CPT | Mod: CPTII,S$GLB,, | Performed by: NURSE PRACTITIONER

## 2023-10-31 PROCEDURE — 3044F HG A1C LEVEL LT 7.0%: CPT | Mod: CPTII,S$GLB,, | Performed by: NURSE PRACTITIONER

## 2023-10-31 PROCEDURE — 3008F BODY MASS INDEX DOCD: CPT | Mod: CPTII,S$GLB,, | Performed by: NURSE PRACTITIONER

## 2023-10-31 PROCEDURE — 3079F PR MOST RECENT DIASTOLIC BLOOD PRESSURE 80-89 MM HG: ICD-10-PCS | Mod: CPTII,S$GLB,, | Performed by: NURSE PRACTITIONER

## 2023-10-31 PROCEDURE — 90686 IIV4 VACC NO PRSV 0.5 ML IM: CPT | Mod: S$GLB,,, | Performed by: NURSE PRACTITIONER

## 2023-10-31 PROCEDURE — 90471 IMMUNIZATION ADMIN: CPT | Mod: S$GLB,,, | Performed by: NURSE PRACTITIONER

## 2023-10-31 PROCEDURE — 99214 OFFICE O/P EST MOD 30 MIN: CPT | Mod: 25,S$GLB,, | Performed by: NURSE PRACTITIONER

## 2023-10-31 PROCEDURE — 1159F MED LIST DOCD IN RCRD: CPT | Mod: CPTII,S$GLB,, | Performed by: NURSE PRACTITIONER

## 2023-10-31 PROCEDURE — 3008F PR BODY MASS INDEX (BMI) DOCUMENTED: ICD-10-PCS | Mod: CPTII,S$GLB,, | Performed by: NURSE PRACTITIONER

## 2023-10-31 PROCEDURE — 90686 PR FLU VACCINE, QIIV4, NO PRSV, 0.5 ML, IM: ICD-10-PCS | Mod: S$GLB,,, | Performed by: NURSE PRACTITIONER

## 2023-10-31 PROCEDURE — 3075F SYST BP GE 130 - 139MM HG: CPT | Mod: CPTII,S$GLB,, | Performed by: NURSE PRACTITIONER

## 2023-10-31 PROCEDURE — 3044F PR MOST RECENT HEMOGLOBIN A1C LEVEL <7.0%: ICD-10-PCS | Mod: CPTII,S$GLB,, | Performed by: NURSE PRACTITIONER

## 2023-10-31 PROCEDURE — 3075F PR MOST RECENT SYSTOLIC BLOOD PRESS GE 130-139MM HG: ICD-10-PCS | Mod: CPTII,S$GLB,, | Performed by: NURSE PRACTITIONER

## 2023-10-31 PROCEDURE — 99214 PR OFFICE/OUTPT VISIT, EST, LEVL IV, 30-39 MIN: ICD-10-PCS | Mod: 25,S$GLB,, | Performed by: NURSE PRACTITIONER

## 2023-10-31 PROCEDURE — 90471 PR IMMUNIZ ADMIN,1 SINGLE/COMB VAC/TOXOID: ICD-10-PCS | Mod: S$GLB,,, | Performed by: NURSE PRACTITIONER

## 2023-11-03 RX ORDER — METFORMIN HYDROCHLORIDE 850 MG/1
850 TABLET ORAL
Qty: 90 TABLET | Refills: 0 | Status: SHIPPED | OUTPATIENT
Start: 2023-11-03 | End: 2023-12-08

## 2023-11-07 ENCOUNTER — PATIENT MESSAGE (OUTPATIENT)
Dept: FAMILY MEDICINE | Facility: CLINIC | Age: 48
End: 2023-11-07
Payer: COMMERCIAL

## 2023-12-08 DIAGNOSIS — R73.03 PREDIABETES: ICD-10-CM

## 2023-12-08 DIAGNOSIS — N52.9 ERECTILE DYSFUNCTION, UNSPECIFIED ERECTILE DYSFUNCTION TYPE: ICD-10-CM

## 2023-12-08 RX ORDER — METFORMIN HYDROCHLORIDE 850 MG/1
850 TABLET ORAL
Qty: 90 TABLET | Refills: 1 | Status: SHIPPED | OUTPATIENT
Start: 2023-12-08

## 2023-12-08 RX ORDER — SILDENAFIL 50 MG/1
50 TABLET, FILM COATED ORAL DAILY PRN
Qty: 90 TABLET | Refills: 1 | Status: SHIPPED | OUTPATIENT
Start: 2023-12-08

## 2023-12-15 ENCOUNTER — PATIENT MESSAGE (OUTPATIENT)
Dept: FAMILY MEDICINE | Facility: CLINIC | Age: 48
End: 2023-12-15
Payer: COMMERCIAL

## 2023-12-15 DIAGNOSIS — R79.9 HIGH BLOOD UREA NITROGEN (BUN): ICD-10-CM

## 2023-12-15 DIAGNOSIS — N28.9 RENAL INSUFFICIENCY: Primary | ICD-10-CM

## 2023-12-15 DIAGNOSIS — R79.89 ELEVATED SERUM CREATININE: ICD-10-CM

## 2023-12-15 LAB
ANION GAP SERPL CALC-SCNC: 11 MMOL/L (ref 8–17)
BUN/CREAT SERPL: 19.8 (ref 6–20)
CALCIUM SERPL-MCNC: 9.7 MG/DL (ref 8.6–10.2)
CARBON DIOXIDE, CO2: 26 MMOL/L (ref 22–29)
CHLORIDE: 106 MMOL/L (ref 98–107)
CREAT SERPL-MCNC: 1.25 MG/DL (ref 0.7–1.2)
GFR ESTIMATION: 70.59 ML/MIN/1.73M2
GLUCOSE: 119 MG/DL (ref 74–106)
POTASSIUM: 4 MMOL/L (ref 3.5–5.1)
SODIUM: 143 MMOL/L (ref 136–145)
UREA NITROGEN (BUN): 24.7 MG/DL (ref 6–20)

## 2024-01-03 DIAGNOSIS — R79.89 ELEVATED SERUM CREATININE: ICD-10-CM

## 2024-01-03 DIAGNOSIS — N28.9 RENAL DISEASE: Primary | ICD-10-CM

## 2024-01-04 ENCOUNTER — PATIENT MESSAGE (OUTPATIENT)
Dept: FAMILY MEDICINE | Facility: CLINIC | Age: 49
End: 2024-01-04
Payer: COMMERCIAL

## 2024-01-09 ENCOUNTER — PATIENT MESSAGE (OUTPATIENT)
Dept: ADMINISTRATIVE | Facility: HOSPITAL | Age: 49
End: 2024-01-09
Payer: COMMERCIAL

## 2024-01-09 ENCOUNTER — PATIENT MESSAGE (OUTPATIENT)
Dept: FAMILY MEDICINE | Facility: CLINIC | Age: 49
End: 2024-01-09
Payer: COMMERCIAL

## 2024-01-09 ENCOUNTER — PATIENT OUTREACH (OUTPATIENT)
Dept: ADMINISTRATIVE | Facility: HOSPITAL | Age: 49
End: 2024-01-09
Payer: COMMERCIAL

## 2024-01-09 NOTE — PROGRESS NOTES
Replying to Campaign Questionnaire for Overdue HM:  Colon    PCP placed referral to general surgery in November. Appointment has not been scheduled. Sent message to PCP staff to help patient get scheduled.

## 2024-01-09 NOTE — Clinical Note
Good Morning,   Patient responding to our campaign message to have his colonoscopy scheduled. I noticed Mr. Kyle placed a referral to GI in November 2023, it has not been scheduled.Is there a contact with GI that you know of to check on the status of this referral?   Thank You, ADILSON [facility] Coordination [facility]

## 2024-01-18 ENCOUNTER — OFFICE VISIT (OUTPATIENT)
Dept: FAMILY MEDICINE | Facility: CLINIC | Age: 49
End: 2024-01-18
Payer: COMMERCIAL

## 2024-01-18 VITALS
HEART RATE: 73 BPM | BODY MASS INDEX: 26.18 KG/M2 | HEIGHT: 71 IN | OXYGEN SATURATION: 99 % | DIASTOLIC BLOOD PRESSURE: 78 MMHG | WEIGHT: 187 LBS | SYSTOLIC BLOOD PRESSURE: 135 MMHG

## 2024-01-18 DIAGNOSIS — N28.9 RENAL DISEASE: ICD-10-CM

## 2024-01-18 DIAGNOSIS — R73.03 PREDIABETES: ICD-10-CM

## 2024-01-18 DIAGNOSIS — J34.0 ABSCESS OF NOSE (SEPTUM): Primary | ICD-10-CM

## 2024-01-18 PROCEDURE — 99213 OFFICE O/P EST LOW 20 MIN: CPT | Mod: S$GLB,,, | Performed by: NURSE PRACTITIONER

## 2024-01-18 PROCEDURE — 3078F DIAST BP <80 MM HG: CPT | Mod: CPTII,S$GLB,, | Performed by: NURSE PRACTITIONER

## 2024-01-18 PROCEDURE — 1159F MED LIST DOCD IN RCRD: CPT | Mod: CPTII,S$GLB,, | Performed by: NURSE PRACTITIONER

## 2024-01-18 PROCEDURE — 3075F SYST BP GE 130 - 139MM HG: CPT | Mod: CPTII,S$GLB,, | Performed by: NURSE PRACTITIONER

## 2024-01-18 PROCEDURE — 3008F BODY MASS INDEX DOCD: CPT | Mod: CPTII,S$GLB,, | Performed by: NURSE PRACTITIONER

## 2024-01-18 RX ORDER — SULFAMETHOXAZOLE AND TRIMETHOPRIM 800; 160 MG/1; MG/1
1 TABLET ORAL 2 TIMES DAILY
Qty: 14 TABLET | Refills: 0 | Status: SHIPPED | OUTPATIENT
Start: 2024-01-18 | End: 2024-01-25

## 2024-01-18 RX ORDER — LANCETS
EACH MISCELLANEOUS
Qty: 100 EACH | Refills: 5 | Status: SHIPPED | OUTPATIENT
Start: 2024-01-18

## 2024-01-18 RX ORDER — DEXTROSE 4 G
1 TABLET,CHEWABLE ORAL EVERY MORNING
Qty: 1 EACH | Refills: 0 | Status: SHIPPED | OUTPATIENT
Start: 2024-01-18 | End: 2025-01-17

## 2024-01-18 NOTE — PROGRESS NOTES
Subjective:      Patient ID: Carl Lorenzo is a 48 y.o. male.    Chief Complaint: Other (Nose infection)      Carl Lorenzo is a 48 y.o. male    Hx of HIV, + RPR    Here today with complaints of abscess to left nostril. Onset 2-3 days ago.  + erythema, warmth, throbbing. No drainage. There is a central scab from him picking the area. He has a significant history of MRSA. Last antibiotic was minocycline.  Denies dental pain, abscess, sore throat, hoarseness, difficulty swallowing, drooling. Denies fever, chills, body aches. Denies preseptal/periorbital involvement. This is a recurrent issue for him. No visual disturbance.      No other issues reported at this time.         Past Medical History:   Diagnosis Date    GERD (gastroesophageal reflux disease)     HIV (human immunodeficiency virus infection) 01/01/2004    Hypertension       Social History     Socioeconomic History    Marital status:    Tobacco Use    Smoking status: Never    Smokeless tobacco: Never   Substance and Sexual Activity    Alcohol use: Yes      Family History   Problem Relation Age of Onset    Thyroid disease Mother     Diabetes Mother     Diabetes Father         ROS:   Review of Systems   Constitutional:  Negative for activity change, chills, diaphoresis, fatigue and fever.   HENT:  Negative for dental problem, ear pain, facial swelling, mouth sores, nosebleeds, sinus pain, sore throat, trouble swallowing and voice change.    Eyes:  Negative for pain and visual disturbance.   Respiratory:  Negative for cough, chest tightness and shortness of breath.    Cardiovascular:  Negative for chest pain, palpitations and leg swelling.   Gastrointestinal:  Negative for abdominal distention, abdominal pain and blood in stool.   Endocrine: Negative for polydipsia, polyphagia and polyuria.   Genitourinary:  Negative for flank pain and frequency.   Musculoskeletal:  Negative for gait problem, joint swelling, neck pain and neck stiffness.    Skin:  Negative for color change, pallor, rash and wound.   Neurological:  Negative for dizziness, syncope, light-headedness and headaches.   Hematological:  Negative for adenopathy. Does not bruise/bleed easily.   Psychiatric/Behavioral:  Negative for confusion, dysphoric mood, hallucinations, self-injury, sleep disturbance and suicidal ideas. The patient is not nervous/anxious and is not hyperactive.      Objective:   Physical Exam  Vitals and nursing note reviewed.   Constitutional:       General: He is not in acute distress.     Appearance: Normal appearance. He is well-developed and normal weight. He is not ill-appearing.   HENT:      Head: Normocephalic and atraumatic. No right periorbital erythema or left periorbital erythema.      Jaw: No swelling.      Salivary Glands: Left salivary gland is not diffusely enlarged or tender.      Nose: Nasal tenderness present.     Eyes:      General: Lids are normal. Vision grossly intact. No scleral icterus.     Conjunctiva/sclera: Conjunctivae normal.      Pupils: Pupils are equal, round, and reactive to light.   Neck:      Vascular: No JVD.      Trachea: No tracheal deviation.   Cardiovascular:      Rate and Rhythm: Normal rate.   Pulmonary:      Effort: Pulmonary effort is normal. No respiratory distress.   Musculoskeletal:         General: No tenderness. Normal range of motion.      Cervical back: Normal range of motion and neck supple.   Lymphadenopathy:      Head:      Left side of head: No tonsillar adenopathy.      Cervical:      Left cervical: No superficial cervical adenopathy.   Skin:     General: Skin is warm and dry.      Findings: No erythema or rash.   Neurological:      Mental Status: He is alert and oriented to person, place, and time. Mental status is at baseline.   Psychiatric:         Attention and Perception: Attention and perception normal.         Mood and Affect: Mood and affect normal.         Speech: Speech normal.         Behavior: Behavior  normal. Behavior is cooperative.         Thought Content: Thought content normal.         Cognition and Memory: Cognition and memory normal.         Judgment: Judgment normal.       Assessment:     1. Abscess of nose (septum)    2. Prediabetes    3. Renal disease      No images are attached to the encounter.   Plan:     Problem List Items Addressed This Visit          Renal/    Renal disease    Current Assessment & Plan       The patient has an elevated creatinine with a normal GFR.  His ultrasound showed medical renal disease.  He has been referred to nephrology and is awaiting appointment.  We discussed getting off of metformin.  Will defer this to Nephrology.            Endocrine    Prediabetes    Current Assessment & Plan     We discussed accucheck and goals.          Relevant Medications    lancets Misc    blood sugar diagnostic Strp    blood-glucose meter [place]ON[place]CH VERIO FLEX MET[place] [facility] [place]     Other Visit Diagnoses       Abscess of nose (septum)    -  Primary    Will start Bactrim.  He will notify me over the weekend whether or not he is improving.  If he has not improving by Sunday will call out minocycline    Relevant Medications    sulfamethoxazole-trimethoprim 800-160mg (BACTRIM DS) 800-160 mg Tab          Procedures     No visits with results within 1 Month(s) from this visit.   Latest known visit with results is:   Office Visit on 10/31/2023   Component Date Value Ref Range Status    Glucose 12/15/2023 119 (H)  74 - 106 mg/dL Final    BUN 12/15/2023 24.7 (H)  6 - 20 mg/dL Final    Creatinine 12/15/2023 1.25 (H)  0.70 - 1.20 mg/dL Final    Recommend repeat creatinine within 90 days.    Calcium 12/15/2023 9.7  8.6 - 10.2 mg/dL Final    Sodium 12/15/2023 143  136 - 145 mmol/L Final    Potassium 12/15/2023 4.0  3.5 - 5.1 mmol/L Final    Chloride 12/15/2023 106  98 - 107 mmol/L Final    CO2 12/15/2023 26  22 - 29 mmol/L Final    BUN/Creatinine Ratio 12/15/2023 19.8  6 - 20 Final    GFR ESTIMATION 12/15/2023  70.59  >60.00 mL/min/1.73m2 Final    Anion Gap 12/15/2023 11.0  8.0 - 17.0 mmol/L Final    Comment: NOTE  Testing performed at:  [facility], [address] [place]  [address] CLIA #:[id number]          [facility] UNKNOWN RAD EAP    Result Date: 1/3/2024                                RADIOLOGY REPORT        PT NAME: SHAHZAD SHELL      [facility]     : 1975 M 48             [address]    ACCT: [id number]                                              [facility] Rec #: [id number]                                        [id number]    Patient Location: [place].RAD/             Procedure: RETROPERITONEAL COMP.    REQUISITION #: [id number]      REPORT #: [id number]           DATE OF EXAM: 24    TIME OF EXAM:        US RETROPERITONEAL COMPLETE        HISTORY:  Elevated creatinine        COMPARISON:  None available        TECHNIQUE/FINDINGS: Real-time sonographic imaging was obtained of the   kidneys and urinary bladder in transverse and longitudinal planes. The right   kidney measures 9.5 cm in length and the left kidney measures 9.7 cm in   length. The kidneys are of increased parenchymal echogenicity without mass,    calculus, or hydronephrosis. The urinary bladder is unremarkable.        IMPRESSION:    Echogenic kidneys as can be seen with medical renal disease..        Signer Name: Kal Chery MD    Signed: 1/3/2024 3:16 PM CST    ()        DICTATING PHYSICIAN:  KAL CHERY MD                   Date Dictated: 24 1455        Signed By:  KAL CHERY MD     Date Signed:  24 1521     CC: GRIS HYDE ; GRIS HYDE      ADMITTING PHYSICIAN:                                                                                                    ORDERING PHY: GRIS HYDE                                                                                                                                                      ATTENDING  KAYLYN: GRIS HYDE    Patient Status:  REG CLI    Admit Service Date: 01/03/24                Duration of encounter:  minutes  This includes face-to-face time and non face-to-face time preparing to see the patient (eg, review of tests), obtaining and/or reviewing separately obtained history, documenting clinical information in the electronic or other health record, independently interpreting resultsand communicating results to the patient/family/caregiver, or care coordination      DISCLAIMER: This note was prepared with [employer] voice recognition transcription software. Garbled syntax, mangled pronouns, and other bizarre constructions may be attributed to that software system.     All diagnostic data (labs/imaging) was reviewed with the patient and/or family member in the room.  All questions were answered to their liking. The patient and/or family member voiced understanding of all instructions provided. Expectations regarding follow up and treatment plan were voiced and confirmed prior to departure. The patient was given orders/instructions at the end of the visit for reference. They were instructed to notify my office if they have not been contacted for imaging/referrals/labs/results in 1-2 weeks. They voiced understanding of all of the above.     Follow up:     There are no Patient Instructions on file for this visit.     Follow up in about 4 months (around 5/18/2024), or if symptoms worsen or fail to improve, for He will follow-up in 4 months for HIV related care.

## 2024-01-18 NOTE — ASSESSMENT & PLAN NOTE
The patient has an elevated creatinine with a normal GFR.  His ultrasound showed medical renal disease.  He has been referred to nephrology and is awaiting appointment.  We discussed getting off of metformin.  Will defer this to Nephrology.

## 2024-01-19 ENCOUNTER — PATIENT MESSAGE (OUTPATIENT)
Dept: FAMILY MEDICINE | Facility: CLINIC | Age: 49
End: 2024-01-19
Payer: COMMERCIAL

## 2024-01-19 DIAGNOSIS — J34.0 ABSCESS OF NOSE (SEPTUM): Primary | ICD-10-CM

## 2024-01-19 RX ORDER — LINEZOLID 600 MG/1
600 TABLET, FILM COATED ORAL EVERY 12 HOURS
Qty: 20 TABLET | Refills: 0 | Status: SHIPPED | OUTPATIENT
Start: 2024-01-19 | End: 2024-01-29

## 2024-03-18 ENCOUNTER — PATIENT MESSAGE (OUTPATIENT)
Dept: FAMILY MEDICINE | Facility: CLINIC | Age: 49
End: 2024-03-18
Payer: COMMERCIAL

## 2024-03-18 RX ORDER — SULFAMETHOXAZOLE AND TRIMETHOPRIM 800; 160 MG/1; MG/1
1 TABLET ORAL 2 TIMES DAILY
Qty: 14 TABLET | Refills: 0 | Status: SHIPPED | OUTPATIENT
Start: 2024-03-18 | End: 2024-03-25

## 2024-04-03 ENCOUNTER — PATIENT MESSAGE (OUTPATIENT)
Dept: FAMILY MEDICINE | Facility: CLINIC | Age: 49
End: 2024-04-03
Payer: COMMERCIAL

## 2024-04-03 DIAGNOSIS — Z11.3 SCREENING EXAMINATION FOR STD (SEXUALLY TRANSMITTED DISEASE): ICD-10-CM

## 2024-04-03 DIAGNOSIS — A51.49 SECONDARY SYPHILIS: ICD-10-CM

## 2024-04-03 DIAGNOSIS — R73.03 PREDIABETES: ICD-10-CM

## 2024-04-03 DIAGNOSIS — Z00.00 PREVENTATIVE HEALTH CARE: Primary | ICD-10-CM

## 2024-04-03 DIAGNOSIS — E78.5 HYPERLIPIDEMIA, UNSPECIFIED HYPERLIPIDEMIA TYPE: ICD-10-CM

## 2024-04-03 DIAGNOSIS — Z12.5 SCREENING FOR PROSTATE CANCER: ICD-10-CM

## 2024-04-03 DIAGNOSIS — Z79.899 LONG TERM USE OF DRUG: ICD-10-CM

## 2024-04-03 DIAGNOSIS — B20 HUMAN IMMUNODEFICIENCY VIRUS (HIV) DISEASE: ICD-10-CM

## 2024-04-16 LAB
ABS NRBC COUNT: 0 X 10 3/UL (ref 0–0.01)
ABSOLUTE BASOPHIL: 0.02 X 10 3/UL (ref 0–0.22)
ABSOLUTE EOSINOPHIL: 0.03 X 10 3/UL (ref 0.04–0.54)
ABSOLUTE IMMATURE GRAN: 0.01 X 10 3/UL (ref 0–0.04)
ABSOLUTE LYMPHOCYTE: 2.01 X 10 3/UL (ref 0.86–4.75)
ABSOLUTE MONOCYTE: 0.33 X 10 3/UL (ref 0.22–1.08)
ALBUMIN SERPL-MCNC: 4.8 G/DL (ref 3.5–5.2)
ALBUMIN/GLOB SERPL ELPH: 1.8 {RATIO} (ref 1–2.7)
ALP ISOS SERPL LEV INH-CCNC: 69 U/L (ref 40–130)
ALT (SGPT): 14 U/L (ref 0–41)
AMORPH URATE CRY URNS QL MICRO: NEGATIVE
ANION GAP SERPL CALC-SCNC: 12 MMOL/L (ref 8–17)
AST SERPL-CCNC: 14 U/L (ref 0–40)
BACTERIA #/AREA URNS HPF: ABNORMAL /[HPF]
BASOPHILS NFR BLD: 0.3 % (ref 0.2–1.2)
BILIRUB UR QL STRIP: NEGATIVE
BILIRUBIN, TOTAL: 0.5 MG/DL (ref 0–1.2)
BUN/CREAT SERPL: 16.9 (ref 6–20)
CALCIUM SERPL-MCNC: 9.8 MG/DL (ref 8.6–10.2)
CARBON DIOXIDE, CO2: 27 MMOL/L (ref 22–29)
CHLORIDE: 104 MMOL/L (ref 98–107)
CHOLEST SERPL-MSCNC: 184 MG/DL (ref 100–200)
CLARITY UR: CLEAR
COLOR UR: YELLOW
CREAT SERPL-MCNC: 1.24 MG/DL (ref 0.7–1.2)
EOSINOPHIL NFR BLD: 0.5 % (ref 0.7–7)
EPITHELIAL CELLS: ABNORMAL
ESTIMATED AVERAGE GLUCOSE: 120 MG/DL
GFR ESTIMATION: 70.83 ML/MIN/1.73M2
GLOBULIN: 2.7 G/DL (ref 1.5–4.5)
GLUCOSE (UA): NEGATIVE MG/DL
GLUCOSE: 109 MG/DL (ref 74–106)
HBA1C MFR BLD: 5.8 % (ref 4–6)
HCT VFR BLD AUTO: 42.8 % (ref 42–52)
HDLC SERPL-MCNC: 67 MG/DL
HGB BLD-MCNC: 14.7 G/DL (ref 14–18)
IMMATURE GRANULOCYTES: 0.2 % (ref 0–0.5)
KETONES UR QL STRIP: NEGATIVE MG/DL
LDL/HDL RATIO: 1.4 (ref 1–3)
LDLC SERPL CALC-MCNC: 95.4 MG/DL (ref 0–100)
LEUKOCYTE ESTERASE UR QL STRIP: NEGATIVE
LYMPHOCYTES NFR BLD: 34.8 % (ref 19.3–53.1)
MCH RBC QN AUTO: 33.3 PG (ref 27–32)
MCHC RBC AUTO-ENTMCNC: 34.3 G/DL (ref 32–36)
MCV RBC AUTO: 97.1 FL (ref 80–94)
MONOCYTES NFR BLD: 5.7 % (ref 4.7–12.5)
MUCOUS THREADS URNS QL MICRO: NEGATIVE
NEUTROPHILS # BLD AUTO: 3.38 X 10 3/UL (ref 2.15–7.56)
NEUTROPHILS NFR BLD: 58.5 % (ref 34–71.1)
NITRITE UR QL STRIP: NEGATIVE
NUCLEATED RED BLOOD CELLS: 0 /100 WBC (ref 0–0.2)
OCCULT BLOOD: NEGATIVE
PH, URINE: 5 (ref 5–7.5)
PLATELET # BLD AUTO: 149 X 10 3/UL (ref 135–400)
POTASSIUM: 4.2 MMOL/L (ref 3.5–5.1)
PROT SNV-MCNC: 7.5 G/DL (ref 6.4–8.3)
PROT UR QL STRIP: NEGATIVE MG/DL
PSA, DIAGNOSTIC: 1.05 NG/ML (ref 0–4)
RBC # BLD AUTO: 4.41 X 10 6/UL (ref 4.7–6.1)
RBC/HPF: ABNORMAL
RDW-SD: 46.2 FL (ref 37–54)
RPR REFLEX: NON REACTIVE
SODIUM: 143 MMOL/L (ref 136–145)
SP GR UR STRIP: 1.02 (ref 1–1.03)
SYPHILIS TREPONEMAL ANTIBODY: REACTIVE
T4, FREE: 1.17 NG/DL (ref 0.93–1.7)
TRIGL SERPL-MCNC: 108 MG/DL (ref 0–150)
TSH SERPL DL<=0.005 MIU/L-ACNC: 1.61 UIU/ML (ref 0.27–4.2)
UREA NITROGEN (BUN): 21 MG/DL (ref 6–20)
UROBILINOGEN, URINE: NORMAL E.U./DL (ref 0–1)
WBC # BLD: 5.78 X 10 3/UL (ref 4.3–10.8)
WBC/HPF: ABNORMAL

## 2024-04-17 LAB
CHLAMYDIA: NEGATIVE
GONORRHEA: NEGATIVE
HBV SURFACE AG SERPL QL IA: NONREACTIVE
HCV IGG SERPL QL IA: NONREACTIVE
SOURCE: NORMAL

## 2024-04-18 ENCOUNTER — PATIENT MESSAGE (OUTPATIENT)
Dept: FAMILY MEDICINE | Facility: CLINIC | Age: 49
End: 2024-04-18
Payer: COMMERCIAL

## 2024-04-18 LAB
% CD4 (HELPER CELLS): 39 % (ref 30–61)
ABSOLUTE CD4 + CELLS: 880 CELLS/UL (ref 490–1740)
ABSOLUTE CD8+CELLS: 959 CELLS/UL (ref 180–1170)
ABSOLUTE LYMPHOCYTES: 2275 CELLS/UL (ref 850–3900)
CD4/CD8 RATIO: 0.92 (ref 0.86–5)
CD8 %: 42 % (ref 12–42)
COPIES/ML: 27 COPIES/ML
LOG COPIES/ML: 1.43 LOG COPIES/ML

## 2024-04-18 NOTE — TELEPHONE ENCOUNTER
His viral load is slightly up from last time. We have these discussions in person bc I have to ask questions related to him missing doses.... medication interactions.... stomach problems... etc.  Otherwise his labs look ok.  The abnormal ones are normal for him.

## 2024-04-30 ENCOUNTER — PATIENT MESSAGE (OUTPATIENT)
Dept: FAMILY MEDICINE | Facility: CLINIC | Age: 49
End: 2024-04-30
Payer: COMMERCIAL

## 2024-04-30 DIAGNOSIS — J34.0 NASAL ABSCESS: Primary | ICD-10-CM

## 2024-04-30 RX ORDER — SULFAMETHOXAZOLE AND TRIMETHOPRIM 800; 160 MG/1; MG/1
1 TABLET ORAL 2 TIMES DAILY
Qty: 14 TABLET | Refills: 0 | OUTPATIENT
Start: 2024-04-30

## 2024-04-30 RX ORDER — MINOCYCLINE HYDROCHLORIDE 100 MG/1
100 CAPSULE ORAL EVERY 12 HOURS
Qty: 20 CAPSULE | Refills: 0 | Status: SHIPPED | OUTPATIENT
Start: 2024-04-30 | End: 2024-05-10

## 2024-04-30 RX ORDER — MUPIROCIN 20 MG/G
OINTMENT TOPICAL 2 TIMES DAILY
Qty: 22 G | Refills: 0 | Status: SHIPPED | OUTPATIENT
Start: 2024-04-30 | End: 2024-05-10

## 2024-04-30 NOTE — PROGRESS NOTES
Infectious Diseases Clinic Note    Subjective:       Patient ID: Carl Lorenzo is a 48 y.o. male     Chief Complaint: lab results        Carl Lorenzo is a 48 y.o. male here today for 6 month f/u regarding HIV and syphilis     He is currently on Triumeq.  He is 100% compliant with meds. He has not missed any doses in the past month. He did miss 1-2 doses in March.  No side effects reported.  He had labs done prior to arrival and is here to discuss.  Denies opportunistic infections.  Denies fever chills myalgias lymphadenopathy.  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     He just completed antibiotics for abscess of the nose.  Reports some improvement in the abscess.  It has resolved.  He has a history of recurrent infections      No other issues reported at this time.                  Past Medical History:   Diagnosis Date    GERD (gastroesophageal reflux disease)     HIV (human immunodeficiency virus infection) 01/01/2004    Hypertension        Social History     Socioeconomic History    Marital status:    Tobacco Use    Smoking status: Never    Smokeless tobacco: Never   Substance and Sexual Activity    Alcohol use: Yes         Current Outpatient Medications:     abacavir-dolutegravir-lamivud (TRIUMEQ) 600- mg Tab, Take 1 tablet by mouth once daily., Disp: 90 tablet, Rfl: 3    abacavir-dolutegravir-lamivud (TRIUMEQ) 600- mg Tab, Take 1 tablet by mouth once daily., Disp: 90 tablet, Rfl: 3    ascorbic acid, vitamin C, (VITAMIN C) 100 MG tablet, Take 100 mg by mouth once daily., Disp: , Rfl:     b complex vitamins capsule, Take 1 capsule by mouth once daily., Disp: , Rfl:     blood sugar diagnostic Strp, To check BG 1 times daily, to use with insurance preferred meter, Disp: 100 each, Rfl: 5    blood-glucose meter (ONETOUCH VERIO FLEX METER) Misc, 1 each by Misc.(Non-Drug; Combo Route) route every morning., Disp: 1 each, Rfl: 0    hepatitis A and B vaccine, PF, (TWINRIX) 720  NACHO unit- 20 mcg/mL Syrg suspension, 1 mL given IM on a 0-, 1-, and 6-month schedule for a total of 3 doses, Disp: 1 mL, Rfl: 2    lancets Misc, To check BG 1 times daily, to use with insurance preferred meter, Disp: 100 each, Rfl: 5    LORazepam (ATIVAN) 0.5 MG tablet, Take 1 tablet (0.5 mg total) by mouth daily as needed for Anxiety., Disp: 90 tablet, Rfl: 1    metFORMIN (GLUCOPHAGE) 850 MG tablet, Take 1 tablet (850 mg total) by mouth daily with breakfast., Disp: 90 tablet, Rfl: 1    minocycline (MINOCIN,DYNACIN) 100 MG capsule, Take 1 capsule (100 mg total) by mouth every 12 (twelve) hours. for 10 days, Disp: 20 capsule, Rfl: 0    mupirocin (BACTROBAN) 2 % ointment, by Nasal route 2 (two) times daily. for 10 days, Disp: 22 g, Rfl: 0    omeprazole (PRILOSEC) 40 MG capsule, Take 1 capsule (40 mg total) by mouth every morning., Disp: 90 capsule, Rfl: 3    pravastatin (PRAVACHOL) 10 MG tablet, Take 1 tablet (10 mg total) by mouth every evening., Disp: 90 tablet, Rfl: 3    sildenafiL (VIAGRA) 50 MG tablet, Take 1 tablet (50 mg total) by mouth daily as needed for Erectile Dysfunction., Disp: 90 tablet, Rfl: 1    sod sulf-pot chloride-mag sulf (SUTAB) 1.479-0.188- 0.225 gram tablet, Take 12 tablets by mouth once daily. Take according to package instructions with indicated amount of water., Disp: 24 tablet, Rfl: 0    triamcinolone acetonide 0.1% (KENALOG) 0.1 % cream, Apply topically 2 (two) times daily. Apply to affected area, Disp: 45 g, Rfl: 2    valACYclovir (VALTREX) 500 MG tablet, TAKE 1 TABLET BY MOUTH TWICE A DAY, Disp: 180 tablet, Rfl: 3    vitamin E 100 UNIT capsule, Take 100 Units by mouth once daily., Disp: , Rfl:     Review of Systems   Constitutional:  Negative for activity change, chills, diaphoresis, fatigue and fever.   HENT:  Negative for ear pain, mouth sores, nosebleeds and trouble swallowing.    Eyes:  Negative for pain and visual disturbance.   Respiratory:  Negative for cough, chest tightness  and shortness of breath.    Cardiovascular:  Negative for chest pain, palpitations and leg swelling.   Gastrointestinal:  Negative for abdominal distention, abdominal pain and blood in stool.   Endocrine: Negative for polydipsia, polyphagia and polyuria.   Genitourinary:  Negative for flank pain, frequency, genital sores, penile discharge and testicular pain.   Musculoskeletal:  Negative for gait problem, joint swelling, neck pain and neck stiffness.   Skin:  Negative for color change, pallor and rash.   Allergic/Immunologic: Negative for immunocompromised state.   Neurological:  Negative for dizziness, syncope, light-headedness and headaches.   Hematological:  Negative for adenopathy. Does not bruise/bleed easily.   Psychiatric/Behavioral:  Negative for confusion, dysphoric mood, hallucinations, self-injury, sleep disturbance and suicidal ideas. The patient is not nervous/anxious and is not hyperactive.            Objective:      Vitals:    05/02/24 0911   BP: 126/82   Pulse: 73   Resp: 14     Physical Exam  Vitals and nursing note reviewed.   Constitutional:       General: He is not in acute distress.     Appearance: Normal appearance. He is well-developed and normal weight. He is not ill-appearing.   HENT:      Head: Normocephalic.      Nose: Nose normal.   Eyes:      General: No scleral icterus.     Pupils: Pupils are equal, round, and reactive to light.   Neck:      Vascular: No carotid bruit or JVD.      Trachea: No tracheal deviation.   Cardiovascular:      Rate and Rhythm: Normal rate and regular rhythm.   Pulmonary:      Effort: Pulmonary effort is normal.      Breath sounds: Normal breath sounds.   Abdominal:      General: Abdomen is flat. Bowel sounds are normal.   Musculoskeletal:         General: Normal range of motion.      Cervical back: Neck supple.   Skin:     General: Skin is warm and dry.      Coloration: Skin is not jaundiced.      Findings: No erythema or rash.   Neurological:      Mental  Status: He is alert and oriented to person, place, and time. Mental status is at baseline.   Psychiatric:         Attention and Perception: Attention and perception normal.         Mood and Affect: Mood and affect normal.         Speech: Speech normal.         Behavior: Behavior normal. Behavior is cooperative.         Thought Content: Thought content normal.         Cognition and Memory: Cognition and memory normal.         Judgment: Judgment normal.             Assessment/Plan:       1. Human immunodeficiency virus (HIV) disease    2. Recurrent infections    3. Nasal abscess    4. Renal disease    5. Hyperlipidemia, unspecified hyperlipidemia type    6. Prediabetes      Problem List Items Addressed This Visit          Cardiac/Vascular    Hyperlipidemia    Current Assessment & Plan     Controlled             Renal/    Renal disease    Current Assessment & Plan       The patient has an elevated creatinine with a normal GFR. His ultrasound showed medical renal disease. He has been referred to nephrology and is awaiting appointment in august ID    Human immunodeficiency virus (HIV) disease - Primary    Current Assessment & Plan       Component Ref Range & Units 2 wk ago 6 mo ago   % CD4 ([place] Cells) 30 - 61 % 39 42   Absolute CD4 + Cells 490 - 1,740 cells/uL 880 948   CD8 % 12 - 42 % 42 42   Absolute CD8+Cells 180 - 1,170 cells/uL 959 939   CD4/CD8 Ratio 0.86 - 5.00 0.92 1.01   Absolute Lymphocytes 850 - 3,900 cells/uL 2,275 2,259 CM           Component Ref Range & Units 2 wk ago 6 mo ago   Copies/mL NOT DETECTED copies/mL 27 High  NOT DETECTED   Log copies/mL NOT DETECTED Log copies/mL 1.43 High  NOT DETECTED CM   Comment:       He reports missing 1 or 2 doses back in March    PLAN     1. Continue current therapy w/ Triumeq.   2. We will continue to monitor viral load... if still elevated, would recommend ruling out H Pylori at a later date.            Relevant Orders    Immunoglobulins (IgG, IgA,  IgM) Quantitative    Immunofixation Electrophoresis    S.pneumoniae (IgG) AB (23 Serotypes), MAID    IgG 1, 2, 3, and 4    Complement, Total       Endocrine    Prediabetes    Current Assessment & Plan     A1C improved.           Other Visit Diagnoses       Recurrent infections        will get immune workup. call him once labs are reviewed.    Relevant Orders    Immunoglobulins (IgG, IgA, IgM) Quantitative    Immunofixation Electrophoresis    S.pneumoniae (IgG) AB (23 Serotypes), MAID    IgG 1, 2, 3, and 4    Complement, Total    Nasal abscess        resolved.    Relevant Orders    Immunoglobulins (IgG, IgA, IgM) Quantitative    Immunofixation Electrophoresis    S.pneumoniae (IgG) AB (23 Serotypes), MAID    IgG 1, 2, 3, and 4    Complement, Total           Patient Message on 04/03/2024   Component Date Value Ref Range Status    PSA Diagnostic 04/15/2024 1.050  0.00 - 4.00 ng/mL Final    Comment: This method has been standardized against the WHO (World Health  Organization) reference standard.  Methodology used - electrochemiluminescence immunoassay (ECLIA)  PSA values determined on patient samples by different testing  laboratories cannot be directly compared with one another and could be  the cause of erroneous medical interpretations.  NOTE  Testing performed at:  [facility], [address] [place] [address] CLIA #:[id number]      Color, UA 04/15/2024 YELLOW   Final    Clarity, UA 04/15/2024 CLEAR   Final    Specific Gravity,UA 04/15/2024 1.020  1.005 - 1.030 Final    pH, Urine 04/15/2024 5  5 - 7.5 Final    Leukocytes, UA 04/15/2024 NEGATIVE  NEGATIVE Final    Nitrite, Urine 04/15/2024 NEGATIVE  NEGATIVE Final    Protein, UA 04/15/2024 NEGATIVE  NEGATIVE mg/dL Final    Glucose, UA 04/15/2024 NEGATIVE  NEGATIVE mg/dL Final    Ketones, UA 04/15/2024 NEGATIVE  NEGATIVE mg/dL Final    Urobilinogen, urine 04/15/2024 NORMAL  0 - 1.0 E.U./dL Final    Bilirubin (UA) 04/15/2024 NEGATIVE  NEGATIVE Final     Occult Blood 04/15/2024 NEGATIVE  NEGATIVE Final    WBC/HPF 04/15/2024 RARE  <5 Final    RBC/HPF 04/15/2024 RARE  <5 Final    Amorphous, UA 04/15/2024 NEGATIVE   Final    Bacteria, UA 04/15/2024 RARE (A)  NEG-TRACE Final    Epithelial Cells 04/15/2024 FEW  NEGATIVE-FEW Final    Mucus, UA 04/15/2024 NEGATIVE  NEGATIVE Final    Comment: NOTE  Testing performed at:  [facility], [address] [place] [address] CLIA #:[id number]      Cholesterol 04/15/2024 184  100 - 200 mg/dL Final    Comment: Desirable  <200  Borderline 200-240  High risk  >240      Triglycerides 04/15/2024 108  0 - 150 mg/dL Final    HDL 04/15/2024 67  >60 mg/dL Final    Comment: <40 mg/dL Major risk factor for CHD  >60 mg/dL Negative risk factor for CHD      LDL Cholesterol 04/15/2024 95.4  0 - 100 mg/dL Final    LDL/HDL Ratio 04/15/2024 1.4  1 - 3 Final    Comment: NOTE  Testing performed at:  [facility], [address] [place] [address] CLIA #:[id number]      Hemoglobin A1C 04/15/2024 5.8  4.0 - 6.0 % Final    EST AVERAGE GLUCOSE 04/15/2024 120 (H)  NORMAL MG/DL Final    Comment: NOTE  Testing performed at:  [facility], [address] [place] [address] CLIA #:[id number]      Glucose 04/15/2024 109 (H)  74 - 106 mg/dL Final    BUN 04/15/2024 21.0 (H)  6 - 20 mg/dL Final    Creatinine 04/15/2024 1.24 (H)  0.70 - 1.20 mg/dL Final    Recommend repeat creatinine within 90 days.    AST 04/15/2024 14  0 - 40 U/L Final    ALT (SGPT) 04/15/2024 14  0 - 41 U/L Final    Alkaline Phosphatase 04/15/2024 69  40 - 130 U/L Final    Calcium 04/15/2024 9.8  8.6 - 10.2 mg/dL Final    Protein, Total 04/15/2024 7.5  6.4 - 8.3 g/dL Final    Albumin 04/15/2024 4.8  3.5 - 5.2 g/dL Final    BILIRUBIN, TOTAL 04/15/2024 0.50  0.00 - 1.20 mg/dL Final    Sodium 04/15/2024 143  136 - 145 mmol/L Final    Potassium 04/15/2024 4.2  3.5 - 5.1 mmol/L Final    Chloride 04/15/2024 104  98 - 107 mmol/L Final    CO2  04/15/2024 27  22 - 29 mmol/L Final    Globulin 04/15/2024 2.7  1.5 - 4.5 g/dL Final    Albumin/Globulin Ratio 04/15/2024 1.8  1.0 - 2.7 Final    BUN/Creatinine Ratio 04/15/2024 16.9  6 - 20 Final    GFR ESTIMATION 04/15/2024 70.83  >60.00 mL/min/1.73m2 Final    Anion Gap 04/15/2024 12.0  8.0 - 17.0 mmol/L Final    Comment: NOTE  Testing performed at:  [facility], [address] [place] [address] CLIA #:[id number]      WBC 04/15/2024 5.78  4.3 - 10.8 X 10 3/ul Final    RBC 04/15/2024 4.41 (L)  4.7 - 6.1 X 10 6/ul Final    RDW-SD 04/15/2024 46.2  37 - 54 fl Final    Hemoglobin 04/15/2024 14.7  14 - 18 g/dL Final    Hematocrit 04/15/2024 42.8  42 - 52 % Final    MCV 04/15/2024 97.1 (H)  80 - 94 fl Final    MCH 04/15/2024 33.3 (H)  27 - 32 pg Final    MCHC 04/15/2024 34.3  32 - 36 g/dL Final    Platelets 04/15/2024 149  135 - 400 X 10 3/ul Final    Neutrophils 04/15/2024 58.5  34 - 71.1 % Final    Lymphocytes 04/15/2024 34.8  19.3 - 53.1 % Final    Monocytes 04/15/2024 5.7  4.7 - 12.5 % Final    Eosinophils 04/15/2024 0.5 (L)  0.7 - 7.0 % Final    Basophils 04/15/2024 0.3  0.2 - 1.2 % Final    Neutrophils Absolute 04/15/2024 3.38  2.15 - 7.56 X 10 3/ul Final    Lymphocytes Absolute 04/15/2024 2.01  0.86 - 4.75 X 10 3/ul Final    Monocytes Absolute 04/15/2024 0.33  0.22 - 1.08 X 10 3/ul Final    Eosinophils Absolute 04/15/2024 0.03 (L)  0.04 - 0.54 X 10 3/ul Final    Basophils Absolute 04/15/2024 0.02  0.00 - 0.22 X 10 3/ul Final    Immature Granulocytes Absolute 04/15/2024 0.01  0 - 0.04 X 10 3/ul Final    Immature Granulocytes 04/15/2024 0.2  0 - 0.5 % Final    IG includes metamyelocytes, myelocytes, and promyelocytes    nRBC# 04/15/2024 0.0  0 - 0.2 /100 WBC Final    nRBC Count Absolute 04/15/2024 0.000  0 - 0.012 x 10 3/ul Final    Comment: NOTE  Testing performed at:  [facility], [address] [place] [address] CLIA #:[id number]      Copies/mL 04/15/2024 27 (H)  NOT DETECTED  copies/mL Final    Log copies/mL 04/15/2024 1.43 (H)  NOT DETECTED Log copies/mL Final    Comment:   This test was performed using Real-Time Polymerase Chain  Reaction.    Reportable Range: 20 copies/mL to 10,000,000 copies/mL  (1.30 log copies/mL to 7.00 log copies/mL).  NOTE  Testing performed at:  [facility], [address] [place] [address], [place] [address] CLIA #: [id number]      Syphilis Treponemal Ab 04/15/2024 REACTIVE (A)  NONREACTIVE Final    Comment: Reactive treponemal test results alone are not diagnostic of  syphilis.  Treponemal test results should always be interpreted in conjunction  with additional treponemal or nontreponemal serologic test results,  medical history, and clinical presentation as recommended by the CDC.  Therefore an RPR has been added to this sample to aid in the  diagnosis.  This immunoassay is for the in vitro detection of total antibodies  (IgG and IgM) to Treponema pallidum in human serum.  NOTE  Testing performed at:  [facility], [address] [place] [address] CLIA #:[id number]      Source 04/15/2024 URINE   Final    Chlamydia 04/15/2024 NEGATIVE  NEGATIVE Final    Comment: Testing performed using GenProbe APTIMA COMBO 2 Assay which utilizes  a target amplification nucleic acid probe.      Gonorrhea 04/15/2024 NEGATIVE  NEGATIVE Final    Comment: Testing performed using GenProbe APTIMA COMBO 2 Assay which utilizes  a target amplification nucleic acid probe.  NOTE  Testing performed at:  [facility], [address] [place] [address]  [address] CLIA #:[id number]      HCV Ab 04/15/2024 NONREACTIVE  NONREACTIVE Final    Comment: NOTE  Testing performed at:  [facility], [address] [place] [address]  [address] CLIA #:[id number]      Hepatitis B Surface Ag 04/15/2024 NONREACTIVE  NONREACTIVE Final    Comment: NOTE  Testing performed at:  [facility], [address] [place] [address]  [address] CLIA #:[id number]      RPR Reflex  04/15/2024 NON REACTIVE  NON REACTIVE Final    Comment: Confirmatory TP-PA test is being sent out per CDC guidelines. Do not  interpret until TP-PA test is resulted.  This is being performed as a reflex for a reactive treponemal Ab test.  NOTE  Testing performed at:  [facility], [address] [place] [address]  [address] CLIA #:[id number]      TSH 04/15/2024 1.61  0.27 - 4.20 uIU/mL Final    Comment: NOTE  Testing performed at:  [facility], [address] [place] [address]  [address] CLIA #:[id number]      T4, Free 04/15/2024 1.17  0.93 - 1.70 ng/dL Final    Comment: NOTE  Testing performed at:  [facility], [address] [place] [address]  [address] CLIA #:[id number]      % CD4 ([place] Cells) 04/15/2024 39  30 - 61 % Final    Absolute CD4 + Cells 04/15/2024 880  490 - 1,740 cells/uL Final    CD8 % 04/15/2024 42  12 - 42 % Final    Absolute CD8+Cells 04/15/2024 959  180 - 1,170 cells/uL Final    CD4/CD8 Ratio 04/15/2024 0.92  0.86 - 5.00 Final    Absolute Lymphocytes 04/15/2024 2,275  850 - 3,900 cells/uL Final    Comment: NOTE  Testing performed at:  [facility], [address] [place] [address] CLIA #: [id number]        No results found in the last 30 days.      Duration of encounter: minutes  This includes face-to-face time and non face-to-face time preparing to see the patient (eg, review of tests), obtaining and/or reviewing separately obtained history, documenting clinical information in the electronic or other health record, independently interpreting resultsand communicating results to the patient/family/caregiver, or care coordination.      All diagnostic data (labs/imaging) was reviewed with the patient and/or family member in the room.  All questions were answered to their liking. The patient and/or family member voiced understanding of all instructions provided. Expectations regarding follow up and treatment plan were voiced and confirmed prior to  departure. The patient was given orders/instructions at the end of the visit for reference. They were instructed to notify my office if they have not been contacted for imaging/referrals/labs/results in 1-2 weeks. They voiced understanding of all of the above.     DISCLAIMER: This note was prepared with [employer] voice recognition transcription software. Garbled syntax, mangled pronouns, and other bizarre constructions may be attributed to that software system.     Follow up:     There are no Patient Instructions on file for this visit.     Follow up in about 6 months (around 11/2/2024), or if symptoms worsen or fail to improve.

## 2024-05-01 NOTE — ASSESSMENT & PLAN NOTE
Component Ref Range & Units 2 wk ago 6 mo ago   % CD4 ([place] Cells) 30 - 61 % 39 42   Absolute CD4 + Cells 490 - 1,740 cells/uL 880 948   CD8 % 12 - 42 % 42 42   Absolute CD8+Cells 180 - 1,170 cells/uL 959 939   CD4/CD8 Ratio 0.86 - 5.00 0.92 1.01   Absolute Lymphocytes 850 - 3,900 cells/uL 2,275 2,259 CM           Component Ref Range & Units 2 wk ago 6 mo ago   Copies/mL NOT DETECTED copies/mL 27 High  NOT DETECTED   Log copies/mL NOT DETECTED Log copies/mL 1.43 High  NOT DETECTED CM   Comment:       He reports missing 1 or 2 doses back in March    PLAN     1. Continue current therapy w/ Triumeq.   2. We will continue to monitor viral load... if still elevated, would recommend ruling out H Pylori at a later date.

## 2024-05-01 NOTE — ASSESSMENT & PLAN NOTE
The patient has an elevated creatinine with a normal GFR. His ultrasound showed medical renal disease. He has been referred to nephrology and is awaiting appointment in august

## 2024-05-02 ENCOUNTER — OFFICE VISIT (OUTPATIENT)
Dept: FAMILY MEDICINE | Facility: CLINIC | Age: 49
End: 2024-05-02
Payer: COMMERCIAL

## 2024-05-02 VITALS
RESPIRATION RATE: 14 BRPM | DIASTOLIC BLOOD PRESSURE: 82 MMHG | WEIGHT: 182 LBS | OXYGEN SATURATION: 98 % | HEIGHT: 71 IN | BODY MASS INDEX: 25.48 KG/M2 | SYSTOLIC BLOOD PRESSURE: 126 MMHG | HEART RATE: 73 BPM

## 2024-05-02 DIAGNOSIS — N28.9 RENAL DISEASE: ICD-10-CM

## 2024-05-02 DIAGNOSIS — J34.0 NASAL ABSCESS: ICD-10-CM

## 2024-05-02 DIAGNOSIS — B99.9 RECURRENT INFECTIONS: ICD-10-CM

## 2024-05-02 DIAGNOSIS — E78.5 HYPERLIPIDEMIA, UNSPECIFIED HYPERLIPIDEMIA TYPE: ICD-10-CM

## 2024-05-02 DIAGNOSIS — B20 HUMAN IMMUNODEFICIENCY VIRUS (HIV) DISEASE: Primary | ICD-10-CM

## 2024-05-02 DIAGNOSIS — R73.03 PREDIABETES: ICD-10-CM

## 2024-05-02 PROCEDURE — 3044F HG A1C LEVEL LT 7.0%: CPT | Mod: CPTII,S$GLB,, | Performed by: NURSE PRACTITIONER

## 2024-05-02 PROCEDURE — 99214 OFFICE O/P EST MOD 30 MIN: CPT | Mod: S$GLB,,, | Performed by: NURSE PRACTITIONER

## 2024-05-02 PROCEDURE — 3008F BODY MASS INDEX DOCD: CPT | Mod: CPTII,S$GLB,, | Performed by: NURSE PRACTITIONER

## 2024-05-02 PROCEDURE — 3079F DIAST BP 80-89 MM HG: CPT | Mod: CPTII,S$GLB,, | Performed by: NURSE PRACTITIONER

## 2024-05-02 PROCEDURE — 3074F SYST BP LT 130 MM HG: CPT | Mod: CPTII,S$GLB,, | Performed by: NURSE PRACTITIONER

## 2024-05-23 LAB
IFE PARAPROTEIN, CSF: NOT DETECTED
IGA SERPL-MCNC: 259 MG/DL (ref 70–400)
IGG SERPL-MCNC: 941 MG/DL (ref 700–1600)
IGM: 78 MG/DL (ref 40–230)
INTERPRETATION UR IFE-IMP: NORMAL

## 2024-05-24 ENCOUNTER — PATIENT MESSAGE (OUTPATIENT)
Dept: ADMINISTRATIVE | Facility: HOSPITAL | Age: 49
End: 2024-05-24
Payer: COMMERCIAL

## 2024-05-24 ENCOUNTER — PATIENT MESSAGE (OUTPATIENT)
Dept: FAMILY MEDICINE | Facility: CLINIC | Age: 49
End: 2024-05-24
Payer: COMMERCIAL

## 2024-05-24 ENCOUNTER — PATIENT OUTREACH (OUTPATIENT)
Dept: ADMINISTRATIVE | Facility: HOSPITAL | Age: 49
End: 2024-05-24
Payer: COMMERCIAL

## 2024-05-24 DIAGNOSIS — G47.00 INSOMNIA, UNSPECIFIED TYPE: ICD-10-CM

## 2024-05-24 DIAGNOSIS — Z12.11 SCREENING FOR COLON CANCER: Primary | ICD-10-CM

## 2024-05-24 NOTE — PROGRESS NOTES
Replying to Campaign Questionnaire for Overdue HM: Colonoscopy    Called and spoke with patient regarding scheduling. Message sent to staff to reach out to patient for scheduling.

## 2024-05-24 NOTE — TELEPHONE ENCOUNTER
----- Message from Jose Boykin NP sent at 5/24/2024  2:40 PM CDT -----  Regarding: RE:  Create an order for general surgery and send to Dr. Douglas diagnosis screening colon cancer  ----- Message -----  From: Dora Porter MA  Sent: 5/24/2024   2:23 PM CDT  To: Jose Boykin NP      ----- Message -----  From: Shanelle Hernández MA  Sent: 5/24/2024  12:45 PM CDT  To: Dora Porter MA      ----- Message -----  From: Caitlyn June MA  Sent: 5/24/2024  12:27 PM CDT  To: [facility] Staff    Patient would like to schedule his colonoscopy. Please give him a call. Thank you!

## 2024-05-26 DIAGNOSIS — N52.9 ERECTILE DYSFUNCTION, UNSPECIFIED ERECTILE DYSFUNCTION TYPE: ICD-10-CM

## 2024-05-27 DIAGNOSIS — N52.9 ERECTILE DYSFUNCTION, UNSPECIFIED ERECTILE DYSFUNCTION TYPE: ICD-10-CM

## 2024-05-28 ENCOUNTER — PATIENT MESSAGE (OUTPATIENT)
Dept: SURGERY | Facility: CLINIC | Age: 49
End: 2024-05-28
Payer: COMMERCIAL

## 2024-05-28 RX ORDER — SILDENAFIL 50 MG/1
50 TABLET, FILM COATED ORAL
Qty: 90 TABLET | Refills: 3 | Status: SHIPPED | OUTPATIENT
Start: 2024-05-28

## 2024-05-28 RX ORDER — LORAZEPAM 0.5 MG/1
0.5 TABLET ORAL DAILY PRN
Qty: 90 TABLET | Refills: 1 | Status: SHIPPED | OUTPATIENT
Start: 2024-05-28

## 2024-05-28 RX ORDER — SILDENAFIL 50 MG/1
50 TABLET, FILM COATED ORAL DAILY PRN
Qty: 90 TABLET | Refills: 1 | Status: SHIPPED | OUTPATIENT
Start: 2024-05-28

## 2024-05-31 ENCOUNTER — TELEPHONE (OUTPATIENT)
Dept: SURGERY | Facility: CLINIC | Age: 49
End: 2024-05-31
Payer: COMMERCIAL

## 2024-05-31 ENCOUNTER — PATIENT MESSAGE (OUTPATIENT)
Dept: SURGERY | Facility: CLINIC | Age: 49
End: 2024-05-31
Payer: COMMERCIAL

## 2024-05-31 DIAGNOSIS — Z12.11 SCREENING FOR COLON CANCER: Primary | ICD-10-CM

## 2024-06-03 RX ORDER — SOD SULF/POT CHLORIDE/MAG SULF 1.479 G
12 TABLET ORAL DAILY
Qty: 24 TABLET | Refills: 0 | Status: SHIPPED | OUTPATIENT
Start: 2024-06-03

## 2024-06-18 PROBLEM — D80.3 SELECTIVE DEFICIENCY OF IGG SUBCLASSES: Status: ACTIVE | Noted: 2024-06-18

## 2024-06-21 ENCOUNTER — PATIENT MESSAGE (OUTPATIENT)
Dept: FAMILY MEDICINE | Facility: CLINIC | Age: 49
End: 2024-06-21
Payer: COMMERCIAL

## 2024-06-24 ENCOUNTER — PATIENT MESSAGE (OUTPATIENT)
Dept: FAMILY MEDICINE | Facility: CLINIC | Age: 49
End: 2024-06-24
Payer: COMMERCIAL

## 2024-06-24 DIAGNOSIS — Z11.3 SCREENING EXAMINATION FOR STD (SEXUALLY TRANSMITTED DISEASE): Primary | ICD-10-CM

## 2024-06-24 DIAGNOSIS — Z11.59 NEED FOR HEPATITIS B SCREENING TEST: ICD-10-CM

## 2024-06-27 ENCOUNTER — PATIENT MESSAGE (OUTPATIENT)
Dept: FAMILY MEDICINE | Facility: CLINIC | Age: 49
End: 2024-06-27
Payer: COMMERCIAL

## 2024-06-27 LAB
HBV SURFACE AB SER-ACNC: POSITIVE M[IU]/ML
HBV SURFACE AG SERPL QL IA: NONREACTIVE
RPR REFLEX: NON REACTIVE
SYPHILIS TREPONEMAL ANTIBODY: REACTIVE

## 2024-07-03 ENCOUNTER — PATIENT MESSAGE (OUTPATIENT)
Dept: FAMILY MEDICINE | Facility: CLINIC | Age: 49
End: 2024-07-03
Payer: COMMERCIAL

## 2024-07-03 DIAGNOSIS — J32.9 SINUSITIS, UNSPECIFIED CHRONICITY, UNSPECIFIED LOCATION: Primary | ICD-10-CM

## 2024-07-03 RX ORDER — METHYLPREDNISOLONE 4 MG/1
TABLET ORAL
Qty: 21 EACH | Refills: 0 | Status: SHIPPED | OUTPATIENT
Start: 2024-07-03 | End: 2024-07-24

## 2024-07-03 RX ORDER — CEFDINIR 300 MG/1
300 CAPSULE ORAL 2 TIMES DAILY
Qty: 10 CAPSULE | Refills: 0 | Status: SHIPPED | OUTPATIENT
Start: 2024-07-03 | End: 2024-07-08

## 2024-07-15 ENCOUNTER — PATIENT MESSAGE (OUTPATIENT)
Dept: FAMILY MEDICINE | Facility: CLINIC | Age: 49
End: 2024-07-15
Payer: COMMERCIAL

## 2024-07-15 ENCOUNTER — TELEPHONE (OUTPATIENT)
Dept: GASTROENTEROLOGY | Facility: CLINIC | Age: 49
End: 2024-07-15
Payer: COMMERCIAL

## 2024-07-15 NOTE — TELEPHONE ENCOUNTER
----- Message from Cathy Krause sent at 7/15/2024 10:57 AM CDT -----  Contact: self  Type:  Patient Returning Call    Who Called:Carl Lorenzo  Who Left Message for Patient:unsure  Does the patient know what this is regarding?:reschedule appt  Would the patient rather a call back or a response via [facility]?   Best Call Back Number:[phone] (home)   Additional Information: n/a

## 2024-07-15 NOTE — TELEPHONE ENCOUNTER
Tried to contact patient regarding left message to reschedule appointment and had to leave a message. BF

## 2024-07-17 NOTE — PROGRESS NOTES
"[facility]  [address], [address], [address]  [place] [address]  Phone: [phone]  Fax: [phone]    History & Physical         Provider: Dr. Marques Douglas DO    Patient Name: Carl Lorenzo                                                                       (age):1975  48 y.o.           Gender: male   Phone: [phone]     Referring Physician:      Vital Signs:   Height - 5'11"  Weight - 182 lbs  BMI -  25.4    Plan: Colonoscopy    No diagnosis found.        History:      Past Medical History:   Diagnosis Date    GERD (gastroesophageal reflux disease)     HIV (human immunodeficiency virus infection) 2004    Hypertension       No past surgical history on file.   Medication List with Changes/Refills   Current Medications    ABACAVIR-DOLUTEGRAVIR-LAMIVUD (TRIUMEQ) 600- MG TAB    Take 1 tablet by mouth once daily.    ABACAVIR-DOLUTEGRAVIR-LAMIVUD (TRIUMEQ) 600- MG TAB    Take 1 tablet by mouth once daily.    ASCORBIC ACID, VITAMIN C, (VITAMIN C) 100 MG TABLET    Take 100 mg by mouth once daily.    B COMPLEX VITAMINS CAPSULE    Take 1 capsule by mouth once daily.    BLOOD SUGAR DIAGNOSTIC STRP    To check BG 1 times daily, to use with insurance preferred meter    BLOOD-GLUCOSE METER (ONETOUCH VERIO FLEX METER) MISC    1 each by Misc.(Non-Drug; Combo Route) route every morning.    DEXCHLORPHEN-PHENYLEPHRINE-DM 1-5-10 MG/5 ML SYRP    Take 10 mLs by mouth 4 (four) times daily as needed (cough/congestion).    HEPATITIS A AND B VACCINE, PF, (TWINRIX) [address]- 20 MCG/ML SYRG SUSPENSION    1 mL given IM on a 0-, 1-, and 6-month schedule for a total of 3 doses    LANCETS MISC    To check BG 1 times daily, to use with insurance preferred meter    LORAZEPAM (ATIVAN) 0.5 MG TABLET    Take 1 tablet (0.5 mg total) by mouth daily as needed for Anxiety.    METFORMIN (GLUCOPHAGE) 850 MG TABLET    Take 1 tablet (850 mg total) by mouth daily with breakfast.    " METHYLPREDNISOLONE (MEDROL DOSEPACK) 4 MG TABLET    use as directed    OMEPRAZOLE (PRILOSEC) 40 MG CAPSULE    Take 1 capsule (40 mg total) by mouth every morning.    PRAVASTATIN (PRAVACHOL) 10 MG TABLET    Take 1 tablet (10 mg total) by mouth every evening.    SILDENAFIL (VIAGRA) 50 MG TABLET    TAKE 1 TABLET DAILY AS NEEDED FOR ERECTILE DYSFUNCTION    SILDENAFIL (VIAGRA) 50 MG TABLET    Take 1 tablet (50 mg total) by mouth daily as needed for Erectile Dysfunction.    SOD SULF-POT CHLORIDE-MAG SULF (SUTAB) 1.479-0.188- 0.225 GRAM TABLET    Take 12 tablets by mouth once daily. Take according to package instructions with indicated amount of water.    SOD SULF-POT CHLORIDE-MAG SULF (SUTAB) 1.479-0.188- 0.225 GRAM TABLET    Take 12 tablets by mouth once daily. Take according to package instructions with indicated amount of water. No breakfast day before test. May substitute with Suprep, Clenpiq, Plenvu, Moviprep or GoLytely based on Rx plan and patient preference.    TRIAMCINOLONE ACETONIDE 0.1% (KENALOG) 0.1 % CREAM    Apply topically 2 (two) times daily. Apply to affected area    VALACYCLOVIR (VALTREX) 500 MG TABLET    TAKE 1 TABLET BY MOUTH TWICE A DAY    VITAMIN E 100 UNIT CAPSULE    Take 100 Units by mouth once daily.      Review of patient's allergies indicates:  No Known Allergies   Family History   Problem Relation Name Age of Onset    Thyroid disease Mother      Diabetes Mother      Diabetes Father        Social History     Tobacco Use    Smoking status: Never    Smokeless tobacco: Never   Substance Use Topics    Alcohol use: Yes        Physical Examination:     General Appearance:___________________________  HEENT:  _____________________________________  Abdomen:____________________________________  Heart:________________________________________  Lungs:_______________________________________  Extremities:___________________________________  Skin:_________________________________________  Endocrine:____________________________________  Genitourinary:_________________________________  Neurological:__________________________________      Patient has been evaluated immediately prior to sedation and is medically cleared for endoscopy with IVCS as an ASA class: ______      Physician Signature: _________________________       Date: ________  Time: ________

## 2024-07-23 ENCOUNTER — OFFICE VISIT (OUTPATIENT)
Dept: FAMILY MEDICINE | Facility: CLINIC | Age: 49
End: 2024-07-23
Payer: COMMERCIAL

## 2024-07-23 ENCOUNTER — PATIENT MESSAGE (OUTPATIENT)
Dept: FAMILY MEDICINE | Facility: CLINIC | Age: 49
End: 2024-07-23

## 2024-07-23 VITALS — SYSTOLIC BLOOD PRESSURE: 137 MMHG | DIASTOLIC BLOOD PRESSURE: 88 MMHG | HEART RATE: 79 BPM | OXYGEN SATURATION: 98 %

## 2024-07-23 DIAGNOSIS — D80.3 SELECTIVE DEFICIENCY OF IGG SUBCLASSES: Primary | ICD-10-CM

## 2024-07-23 DIAGNOSIS — B20 HUMAN IMMUNODEFICIENCY VIRUS (HIV) DISEASE: ICD-10-CM

## 2024-07-23 PROCEDURE — 3044F HG A1C LEVEL LT 7.0%: CPT | Mod: CPTII,S$GLB,, | Performed by: NURSE PRACTITIONER

## 2024-07-23 PROCEDURE — 3075F SYST BP GE 130 - 139MM HG: CPT | Mod: CPTII,S$GLB,, | Performed by: NURSE PRACTITIONER

## 2024-07-23 PROCEDURE — 3079F DIAST BP 80-89 MM HG: CPT | Mod: CPTII,S$GLB,, | Performed by: NURSE PRACTITIONER

## 2024-07-23 PROCEDURE — 99213 OFFICE O/P EST LOW 20 MIN: CPT | Mod: 25,S$GLB,, | Performed by: NURSE PRACTITIONER

## 2024-07-23 PROCEDURE — 1159F MED LIST DOCD IN RCRD: CPT | Mod: CPTII,S$GLB,, | Performed by: NURSE PRACTITIONER

## 2024-07-23 PROCEDURE — 90471 IMMUNIZATION ADMIN: CPT | Mod: S$GLB,,, | Performed by: NURSE PRACTITIONER

## 2024-07-23 PROCEDURE — 90677 PCV20 VACCINE IM: CPT | Mod: S$GLB,,, | Performed by: NURSE PRACTITIONER

## 2024-07-23 NOTE — PATIENT INSTRUCTIONS
ANTIBODY DEFICIENCY SYNDROME       Clinical presentation  Recurrent ear infections, sinusitis, bronchitis, and pneumonia are the most frequently observed illnesses in people with SAD. Some individuals show an increased frequency of infection beginning in the first years of life. In others, the onset of infections may occur later. In general, the infections in individuals with SAD are not as severe as those who have combined deficiencies of IgG, IgA, and IgM, like X-linked agammaglobulinemia (XLA) or common variable immune deficiency (CVID). Some, however, may present with a single severe pneumonia or other infection at the time of diagnosis.    Diagnosis  Problems with specific antibody production may be suspected in children and adults who have a history of recurrent infections of the ears, sinuses, bronchi, and/or lungs. The recommended evaluation usually includes measurement of total immunoglobulins, and antibody levels to specific bacteria such as tetanus, diphtheria, and/or Streptococcus pneumoniae.    When total immunoglobulins are low, a more profound immunodeficiency may be present. If isolated low antibody levels to Streptococcus pneumoniae are found during the initial evaluation, a pneumococcal vaccine (Pneumovax) is administered and follow-up levels are measured. Individuals older than 1 year of age may be immunized with the pneumococcal polysaccharide vaccine (Pneumovax 23 or Pnuimmune 23). These vaccines have the ability to induce protective antibody levels to 23 strains (serotypes) of Streptococcus pneumoniae. Antibody levels are measured again 4-6 weeks later to determine if adequate protective antibody levels were produced. Individuals without PI respond to a majority of the serotypes in these vaccines and retain those protective levels for years after receiving them. Antibody responses may not last as long in young children.    For therapy, it is also possible to re-immunize with Prevnar 13, a  conjugate type of pneumococcal vaccine, which, for most, may be more immunogenic than Pneumovax. This vaccine, however, cannot be used to diagnose SAD.    A classification for mild, moderate, and severe forms of SAD exists that factors in the individual's age and number of normal post-immunization serotypes to define immunologic severity. Responses in which children respond to <50% of serotypes and adults respond to <70% have a moderate form of SAD with an increased risk of upper/lower respiratory tract infections that may warrant treatment. In the experience of many clinical immunologists, however, a normal vaccine response for an individual consists of producing protective titers of antibodies to at least 50% of the serotypes present in the vaccine. Furthermore, there are individuals who may fall into a response zone between 50-70% that have significant infections that may warrant a trial of immunoglobulin (Ig) replacement therapy.    An additional subset of people have a memory phenotype, meaning they respond normally initially and subsequently lose protective levels within six months.    When interpreting pneumococcal levels, it is important to recognize the variability in testing methodology and subsequent results that may occur among different labs. This highlights the importance of using a detailed clinical history in addition to laboratory findings to guide the immunologic evaluation when considering a diagnosis of SAD.    Read the latest research  Read the latest research on specific antibody deficiency on PubMed. Note that not all publications listed in PubMed are freely available; some require a subscription to the publishing journal.    Browse research  Inheritance  No clear-cut pattern of inheritance has been observed with SAD.    Treatment  Individuals with SAD frequently have recurrent or chronic infections of the ears, sinuses, bronchi and lungs. Treatment of these infections usually requires  antibiotics. One goal of treatment is to prevent permanent damage to the ears and lungs that might result in hearing loss or chronic lung disease with scarring. Another goal is to maintain individuals with SAD as symptom-free as possible so that they may pursue the activities of daily living such as school or work. Sometimes, antibiotics may be used for prevention (prophylaxis) of infections.    As in IgG subclass deficiency, the use of Ig replacement therapy for SAD is not as clear-cut as it is for those with X-linked agammaglobulinemia (XLA) or common variable immune deficiency (CVID). For individuals with SAD in whom infections and symptoms can be controlled with antibiotics, Ig replacement therapy is usually not necessary. However, for those with more severe clinical phenotypes whose infections cannot be readily controlled with antibiotics or who have more frequent and severe infections, Ig replacement therapy may be considered.    Since many young children appear to outgrow SAD as they get older, it is important to reevaluate them to determine if the deficiency is still present. If Ig replacement therapy has been previously initiated, reevaluation after a period of time is recommended, with discontinuation of Ig therapy for 4-6 months before repeat immune testing and re-immunization with pneumococcal vaccines (if needed) is performed. If the response to vaccination is adequate, Ig replacement therapy may be discontinued and the individual observed. It is reasonable to reevaluate antibody levels periodically to document the retention of protective antibody levels. If the diagnosis of SAD is made in teenagers or adults, resolution of the deficiency is less likely.    Expectations  The outlook for individuals with SAD is generally good. Many children appear to outgrow their deficiency as they get older, usually by age 6. For those for whom the deficiency persists, the use of prophylactic antibiotics and, in  certain circumstances, the use of Ig therapy may prevent serious infections and the development of impaired lung function, hearing loss, or injury to other organ systems.    The natural history of individuals with SAD is not completely understood. SAD seems to occur more often in children, probably due to a delay in the natural maturation of the immune response. Children may outgrow SAD over time.    Adults with similar symptoms and poor response to vaccination are less likely to improve over time. Similar to IgG subclass deficiencies, SAD may evolve into CVID. At the present time, it is not possible to determine which individuals will have the transient type of deficiency as opposed to permanent deficiency, or which individuals will progress to a more wide-ranging immunodeficiency, such as CVID. For these reasons, periodic reevaluation of immunoglobulin levels and specific antibody levels is necessary.

## 2024-07-23 NOTE — ASSESSMENT & PLAN NOTE
HEPATITIS B SURFACE AG (HBsAg)    HEPATITIS B SURFACE AG NONREACTIVE NONREACTIVE  HEPATITIS B SURFACE AB POSITIVE AB NEGATIVE         Component Ref Range & Units 3 mo ago 9 mo ago   Copies/mL NOT DETECTED copies/mL 27 High  NOT DETECTED   Log copies/mL NOT DETECTED Log copies/mL 1.43 High  NOT DETECTED CM             PLAN     1. Continue current therapy w/ Triumeq.   2. We will continue to monitor viral load... if still elevated, would recommend ruling out H Pylori at a later date.

## 2024-07-23 NOTE — ASSESSMENT & PLAN NOTE
SEROTYPE 1 (1) MYM1.4 >=1.0 mcg/mL    SEROTYPE 2 (2) 3.1 >=1.0 mcg/mL    SEROTYPE 3 (3) 0.5 >=1.0 mcg/mL    SEROTYPE 4 (4) 2.0 >=1.0 mcg/mL    SEROTYPE 5 (5) 2.3 >=1.0 mcg/mL    SEROTYPE 8 (8) 10.7 >=1.0 mcg/mL    SEROTYPE 9N (9) 0.6 >=1.0 mcg/mL    SEROTYPE 12F (12) 0.6 >=1.0 mcg/mL    SEROTYPE 14 (14) 81.2 >=1.0 mcg/mL    SEROTYPE 17F (17) 1.2 >=1.0 mcg/mL    SEROTYPE 19F (19) 1.9 >=1.0 mcg/mL    SEROTYPE 20 (20) 3.4 >=1.0 mcg/mL    SEROTYPE 22F (22) 2.2 >=1.0 mcg/mL  SEROTYPE 23F (23) 4.3 >=1.0 mcg/mL  SEROTYPE 6B (26) 3.0 >=1.0 mcg/mL    SEROTYPE 10A (34) 6.0 >=1.0 mcg/mL    SEROTYPE 11A (43) 10.5 >=1.0 mcg/mL    SEROTYPE 7F (51) 1.3 >=1.0 mcg/mL    SEROTYPE 15B (54) 22.3 >=1.0 mcg/mL    SEROTYPE 18C (56) 6.9 >=1.0 mcg/mL    SEROTYPE 19A (57) 2.4 >=1.0 mcg/mL    SEROTYPE 9V (68) 1.5 >=1.0 mcg/mL    SEROTYPE 33F (70) 9.5 >=1.0 mcg/mL        PLAN    Recommend updating PCV 23. Repeat Vaccination in 5 years.   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 Will treat his infections aggressively in the future with prolonged course of ABX.   See AVS for education provided.

## 2024-07-23 NOTE — PROGRESS NOTES
Infectious Diseases Clinic Note    Subjective:       Patient ID: Carl Lorenzo is a 48 y.o. male     Chief Complaint: Results        Carl Lorenzo is a 48 y.o. male here today for lab discussion.     He is currently on Triumeq.  He is 100% compliant with meds. He has not missed any doses in the past month. He did miss 1-2 doses in March.  No side effects reported.  He had labs done prior to arrival and is here to discuss.  Denies opportunistic infections.  Denies fever chills myalgias lymphadenopathy.  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     He has a history of recurrent infections. 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       No other issues reported at this time.                  Past Medical History:   Diagnosis Date    GERD (gastroesophageal reflux disease)     HIV (human immunodeficiency virus infection) 01/01/2004    Hypertension        Social History     Socioeconomic History    Marital status:    Tobacco Use    Smoking status: Never    Smokeless tobacco: Never   Substance and Sexual Activity    Alcohol use: Yes         Current Outpatient Medications:     abacavir-dolutegravir-lamivud (TRIUMEQ) 600- mg Tab, Take 1 tablet by mouth once daily., Disp: 90 tablet, Rfl: 3    abacavir-dolutegravir-lamivud (TRIUMEQ) 600- mg Tab, Take 1 tablet by mouth once daily., Disp: 90 tablet, Rfl: 3    ascorbic acid, vitamin C, (VITAMIN C) 100 MG tablet, Take 100 mg by mouth once daily., Disp: , Rfl:     b complex vitamins capsule, Take 1 capsule by mouth once daily., Disp: , Rfl:     blood sugar diagnostic Strp, To check BG 1 times daily, to use with insurance preferred meter, Disp: 100 each, Rfl: 5    blood-glucose meter (ONETOUCH VERIO FLEX METER) Misc, 1 each by Misc.(Non-Drug; Combo Route) route every morning., Disp: 1 each, Rfl: 0    dexchlorphen-phenylephrine-DM 1-5-10 mg/5 mL Syrp, Take 10 mLs by mouth 4 (four) times daily as needed (cough/congestion)., Disp:  120 mL, Rfl: 0    hepatitis A and B vaccine, PF, (TWINRIX) 720 NACHO unit- 20 mcg/mL Syrg suspension, 1 mL given IM on a 0-, 1-, and 6-month schedule for a total of 3 doses, Disp: 1 mL, Rfl: 2    lancets Misc, To check BG 1 times daily, to use with insurance preferred meter, Disp: 100 each, Rfl: 5    LORazepam (ATIVAN) 0.5 MG tablet, Take 1 tablet (0.5 mg total) by mouth daily as needed for Anxiety., Disp: 90 tablet, Rfl: 1    metFORMIN (GLUCOPHAGE) 850 MG tablet, Take 1 tablet (850 mg total) by mouth daily with breakfast., Disp: 90 tablet, Rfl: 1    methylPREDNISolone (MEDROL DOSEPACK) 4 mg tablet, use as directed, Disp: 21 each, Rfl: 0    omeprazole (PRILOSEC) 40 MG capsule, Take 1 capsule (40 mg total) by mouth every morning., Disp: 90 capsule, Rfl: 3    pravastatin (PRAVACHOL) 10 MG tablet, Take 1 tablet (10 mg total) by mouth every evening., Disp: 90 tablet, Rfl: 3    sildenafiL (VIAGRA) 50 MG tablet, TAKE 1 TABLET DAILY AS NEEDED FOR ERECTILE DYSFUNCTION, Disp: 90 tablet, Rfl: 3    sildenafiL (VIAGRA) 50 MG tablet, Take 1 tablet (50 mg total) by mouth daily as needed for Erectile Dysfunction., Disp: 90 tablet, Rfl: 1    sod sulf-pot chloride-mag sulf (SUTAB) 1.479-0.188- 0.225 gram tablet, Take 12 tablets by mouth once daily. Take according to package instructions with indicated amount of water., Disp: 24 tablet, Rfl: 0    sod sulf-pot chloride-mag sulf (SUTAB) 1.479-0.188- 0.225 gram tablet, Take 12 tablets by mouth once daily. Take according to package instructions with indicated amount of water. No breakfast day before test. May substitute with Suprep, Clenpiq, Plenvu, Moviprep or GoLytely based on Rx plan and patient preference., Disp: 24 tablet, Rfl: 0    triamcinolone acetonide 0.1% (KENALOG) 0.1 % cream, Apply topically 2 (two) times daily. Apply to affected area, Disp: 45 g, Rfl: 2    valACYclovir (VALTREX) 500 MG tablet, TAKE 1 TABLET BY MOUTH TWICE A DAY, Disp: 180 tablet, Rfl: 3    vitamin E 100  UNIT capsule, Take 100 Units by mouth once daily., Disp: , Rfl:     Current Facility-Administered Medications:     (VFC) PCV20 (Prevnar 20) IM vaccine (>/= 6 wks), 0.5 mL, Intramuscular, 1 time in Clinic/HOD,     Review of Systems   Constitutional:  Negative for activity change, chills, diaphoresis, fatigue and fever.   HENT:  Negative for ear pain, mouth sores, nosebleeds and trouble swallowing.    Eyes:  Negative for pain and visual disturbance.   Respiratory:  Negative for cough, chest tightness and shortness of breath.    Cardiovascular:  Negative for chest pain, palpitations and leg swelling.   Gastrointestinal:  Negative for abdominal distention, abdominal pain and blood in stool.   Endocrine: Negative for polydipsia, polyphagia and polyuria.   Genitourinary:  Negative for flank pain, frequency, genital sores, penile discharge and testicular pain.   Musculoskeletal:  Negative for gait problem, joint swelling, neck pain and neck stiffness.   Skin:  Negative for color change, pallor and rash.   Allergic/Immunologic: Negative for immunocompromised state.   Neurological:  Negative for dizziness, syncope, light-headedness and headaches.   Hematological:  Negative for adenopathy. Does not bruise/bleed easily.   Psychiatric/Behavioral:  Negative for confusion, dysphoric mood, hallucinations, self-injury, sleep disturbance and suicidal ideas. The patient is not nervous/anxious and is not hyperactive.            Objective:      Vitals:    07/23/24 0933   BP: 137/88   Pulse: 79     Physical Exam  Vitals and nursing note reviewed.   Constitutional:       General: He is not in acute distress.     Appearance: Normal appearance. He is not ill-appearing.   HENT:      Head: Normocephalic and atraumatic.   Cardiovascular:      Rate and Rhythm: Normal rate.   Pulmonary:      Effort: Pulmonary effort is normal.   Abdominal:      General: Abdomen is flat.   Musculoskeletal:         General: Normal range of motion.      Cervical  back: Normal range of motion.   Skin:     General: Skin is warm.   Neurological:      Mental Status: He is alert and oriented to person, place, and time. Mental status is at baseline.   Psychiatric:         Mood and Affect: Mood normal.         Behavior: Behavior normal.         Thought Content: Thought content normal.         Judgment: Judgment normal.             Assessment/Plan:       1. Selective deficiency of IgG subclasses    2. Human immunodeficiency virus (HIV) disease      Problem List Items Addressed This Visit          ID    Human immunodeficiency virus (HIV) disease    Current Assessment & Plan       HEPATITIS B SURFACE AG (HBsAg)    HEPATITIS B SURFACE AG NONREACTIVE NONREACTIVE  HEPATITIS B SURFACE AB POSITIVE AB NEGATIVE         Component Ref Range & Units 3 mo ago 9 mo ago   Copies/mL NOT DETECTED copies/mL 27 High  NOT DETECTED   Log copies/mL NOT DETECTED Log copies/mL 1.43 High  NOT DETECTED CM             PLAN     1. Continue current therapy w/ Triumeq.   2. We will continue to monitor viral load... if still elevated, would recommend ruling out H Pylori at a later date.             Immunology/Multi System    Selective deficiency of IgG subclasses - Primary    Overview     IGG SUBCLASSES 1,2,3,4 EZ    IGG SUBCLASS 1 351 L 382-929 mg/dL    IGG SUBCLASS 2 233 L 241-700 mg/dL    IGG SUBCLASS 3 39  mg/dL    IGG SUBCLASS 416.6 4-86 mg/dL  IMMUNOGLOBULIN G SERUM 966 856-1319 mg/dL           Current Assessment & Plan       SEROTYPE 1 (1) MYM1.4 >=1.0 mcg/mL    SEROTYPE 2 (2) 3.1 >=1.0 mcg/mL    SEROTYPE 3 (3) 0.5 >=1.0 mcg/mL    SEROTYPE 4 (4) 2.0 >=1.0 mcg/mL    SEROTYPE 5 (5) 2.3 >=1.0 mcg/mL    SEROTYPE 8 (8) 10.7 >=1.0 mcg/mL    SEROTYPE 9N (9) 0.6 >=1.0 mcg/mL    SEROTYPE 12F (12) 0.6 >=1.0 mcg/mL    SEROTYPE 14 (14) 81.2 >=1.0 mcg/mL    SEROTYPE 17F (17) 1.2 >=1.0 mcg/mL    SEROTYPE 19F (19) 1.9 >=1.0 mcg/mL    SEROTYPE 20 (20) 3.4 >=1.0 mcg/mL    SEROTYPE 22F (22) 2.2 >=1.0 mcg/mL  SEROTYPE  23F (23) 4.3 >=1.0 mcg/mL  SEROTYPE 6B (26) 3.0 >=1.0 mcg/mL    SEROTYPE 10A (34) 6.0 >=1.0 mcg/mL    SEROTYPE 11A (43) 10.5 >=1.0 mcg/mL    SEROTYPE 7F (51) 1.3 >=1.0 mcg/mL    SEROTYPE 15B (54) 22.3 >=1.0 mcg/mL    SEROTYPE 18C (56) 6.9 >=1.0 mcg/mL    SEROTYPE 19A (57) 2.4 >=1.0 mcg/mL    SEROTYPE 9V (68) 1.5 >=1.0 mcg/mL    SEROTYPE 33F (70) 9.5 >=1.0 mcg/mL        PLAN    Recommend updating PCV 23. Repeat Vaccination in 5 years.   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 Will treat his infections aggressively in the future with prolonged course of ABX.   See AVS for education provided.          Relevant Medications    (VFC) PCV20 (Prevnar 20) IM vaccine (>/= 6 wks)      Patient Message on 06/24/2024   Component Date Value Ref Range Status    Syphilis Treponemal Ab 06/27/2024 REACTIVE (A)  NONREACTIVE Final    Comment: Reactive treponemal test results alone are not diagnostic of  syphilis.  Treponemal test results should always be interpreted in conjunction  with additional treponemal or nontreponemal serologic test results,  medical history, and clinical presentation as recommended by the CDC.  Therefore an RPR has been added to this sample to aid in the  diagnosis.  This immunoassay is for the in vitro detection of total antibodies  (IgG and IgM) to Treponema pallidum in human serum.  NOTE  Testing performed at:  [facility], [address] [place] [address]  [address] CLIA #:[id number]      Hep B S Ab 06/27/2024 POSITIVE (A)  NEGATIVE Final    Comment: A POSITIVE result indicates a level greater than or equal to 10  mIU/mL and is considered the standard for indicating protective  immunity.  A NEGATIVE result indicates a level less than 10 mIU/mL  and is considered as not having protected immunity.  The determination  of anti-HBS levels has been standardized by use of the WHO Anti-HBS  Reference Preparation expressed in mac-international Units per  milliliter (mIU/mL).  NOTE  Testing performed  at:  [facility], [address] [place] [address], [place]  [address] CLIA #:[id number]      Hepatitis B Surface Ag 06/27/2024 NONREACTIVE  NONREACTIVE Final    Comment: NOTE  Testing performed at:  [facility], [address] [place] [address] [place]  [address] CLIA #:[id number]      RPR Reflex 06/27/2024 NON REACTIVE  NON REACTIVE Final    Comment: Confirmatory TP-PA test is being sent out per CDC guidelines. Do not  interpret until TP-PA test is resulted.  This is being performed as a reflex for a reactive treponemal Ab test.  NOTE  Testing performed at:  [facility], [address] [place] [address], [place]  [address] CLIA #:[id number]        No results found in the last 30 days.      Duration of encounter: minutes  This includes face-to-face time and non face-to-face time preparing to see the patient (eg, review of tests), obtaining and/or reviewing separately obtained history, documenting clinical information in the electronic or other health record, independently interpreting resultsand communicating results to the patient/family/caregiver, or care coordination.      All diagnostic data (labs/imaging) was reviewed with the patient and/or family member in the room.  All questions were answered to their liking. The patient and/or family member voiced understanding of all instructions provided. Expectations regarding follow up and treatment plan were voiced and confirmed prior to departure. The patient was given orders/instructions at the end of the visit for reference. They were instructed to notify my office if they have not been contacted for imaging/referrals/labs/results in 1-2 weeks. They voiced understanding of all of the above.     DISCLAIMER: This note was prepared with [employer] voice recognition transcription software. Garbled syntax, mangled pronouns, and other bizarre constructions may be attributed to that software system.     Follow up:     Patient Instructions     ANTIBODY DEFICIENCY SYNDROME        Clinical presentation  Recurrent ear infections, sinusitis, bronchitis, and pneumonia are the most frequently observed illnesses in people with SAD. Some individuals show an increased frequency of infection beginning in the first years of life. In others, the onset of infections may occur later. In general, the infections in individuals with SAD are not as severe as those who have combined deficiencies of IgG, IgA, and IgM, like X-linked agammaglobulinemia (XLA) or common variable immune deficiency (CVID). Some, however, may present with a single severe pneumonia or other infection at the time of diagnosis.    Diagnosis  Problems with specific antibody production may be suspected in children and adults who have a history of recurrent infections of the ears, sinuses, bronchi, and/or lungs. The recommended evaluation usually includes measurement of total immunoglobulins, and antibody levels to specific bacteria such as tetanus, diphtheria, and/or Streptococcus pneumoniae.    When total immunoglobulins are low, a more profound immunodeficiency may be present. If isolated low antibody levels to Streptococcus pneumoniae are found during the initial evaluation, a pneumococcal vaccine (Pneumovax) is administered and follow-up levels are measured. Individuals older than 1 year of age may be immunized with the pneumococcal polysaccharide vaccine (Pneumovax 23 or Pnuimmune 23). These vaccines have the ability to induce protective antibody levels to 23 strains (serotypes) of Streptococcus pneumoniae. Antibody levels are measured again 4-6 weeks later to determine if adequate protective antibody levels were produced. Individuals without PI respond to a majority of the serotypes in these vaccines and retain those protective levels for years after receiving them. Antibody responses may not last as long in young children.    For therapy, it is also possible to re-immunize with Prevnar 13, a conjugate type of pneumococcal  vaccine, which, for most, may be more immunogenic than Pneumovax. This vaccine, however, cannot be used to diagnose SAD.    A classification for mild, moderate, and severe forms of SAD exists that factors in the individual's age and number of normal post-immunization serotypes to define immunologic severity. Responses in which children respond to <50% of serotypes and adults respond to <70% have a moderate form of SAD with an increased risk of upper/lower respiratory tract infections that may warrant treatment. In the experience of many clinical immunologists, however, a normal vaccine response for an individual consists of producing protective titers of antibodies to at least 50% of the serotypes present in the vaccine. Furthermore, there are individuals who may fall into a response zone between 50-70% that have significant infections that may warrant a trial of immunoglobulin (Ig) replacement therapy.    An additional subset of people have a memory phenotype, meaning they respond normally initially and subsequently lose protective levels within six months.    When interpreting pneumococcal levels, it is important to recognize the variability in testing methodology and subsequent results that may occur among different labs. This highlights the importance of using a detailed clinical history in addition to laboratory findings to guide the immunologic evaluation when considering a diagnosis of SAD.    Read the latest research  Read the latest research on specific antibody deficiency on PubMed. Note that not all publications listed in PubMed are freely available; some require a subscription to the publishing journal.    Browse research  Inheritance  No clear-cut pattern of inheritance has been observed with SAD.    Treatment  Individuals with SAD frequently have recurrent or chronic infections of the ears, sinuses, bronchi and lungs. Treatment of these infections usually requires antibiotics. One goal of treatment is  to prevent permanent damage to the ears and lungs that might result in hearing loss or chronic lung disease with scarring. Another goal is to maintain individuals with SAD as symptom-free as possible so that they may pursue the activities of daily living such as school or work. Sometimes, antibiotics may be used for prevention (prophylaxis) of infections.    As in IgG subclass deficiency, the use of Ig replacement therapy for SAD is not as clear-cut as it is for those with X-linked agammaglobulinemia (XLA) or common variable immune deficiency (CVID). For individuals with SAD in whom infections and symptoms can be controlled with antibiotics, Ig replacement therapy is usually not necessary. However, for those with more severe clinical phenotypes whose infections cannot be readily controlled with antibiotics or who have more frequent and severe infections, Ig replacement therapy may be considered.    Since many young children appear to outgrow SAD as they get older, it is important to reevaluate them to determine if the deficiency is still present. If Ig replacement therapy has been previously initiated, reevaluation after a period of time is recommended, with discontinuation of Ig therapy for 4-6 months before repeat immune testing and re-immunization with pneumococcal vaccines (if needed) is performed. If the response to vaccination is adequate, Ig replacement therapy may be discontinued and the individual observed. It is reasonable to reevaluate antibody levels periodically to document the retention of protective antibody levels. If the diagnosis of SAD is made in teenagers or adults, resolution of the deficiency is less likely.    Expectations  The outlook for individuals with SAD is generally good. Many children appear to outgrow their deficiency as they get older, usually by age 6. For those for whom the deficiency persists, the use of prophylactic antibiotics and, in certain circumstances, the use of Ig therapy  may prevent serious infections and the development of impaired lung function, hearing loss, or injury to other organ systems.    The natural history of individuals with SAD is not completely understood. SAD seems to occur more often in children, probably due to a delay in the natural maturation of the immune response. Children may outgrow SAD over time.    Adults with similar symptoms and poor response to vaccination are less likely to improve over time. Similar to IgG subclass deficiencies, SAD may evolve into CVID. At the present time, it is not possible to determine which individuals will have the transient type of deficiency as opposed to permanent deficiency, or which individuals will progress to a more wide-ranging immunodeficiency, such as CVID. For these reasons, periodic reevaluation of immunoglobulin levels and specific antibody levels is necessary.      Follow up in about 3 months (around 10/23/2024), or if symptoms worsen or fail to improve.

## 2024-07-31 ENCOUNTER — PATIENT MESSAGE (OUTPATIENT)
Dept: FAMILY MEDICINE | Facility: CLINIC | Age: 49
End: 2024-07-31
Payer: COMMERCIAL

## 2024-07-31 DIAGNOSIS — J34.0 NASAL ABSCESS: Primary | ICD-10-CM

## 2024-07-31 RX ORDER — SULFAMETHOXAZOLE AND TRIMETHOPRIM 800; 160 MG/1; MG/1
1 TABLET ORAL 2 TIMES DAILY
Qty: 20 TABLET | Refills: 0 | Status: SHIPPED | OUTPATIENT
Start: 2024-07-31 | End: 2024-08-10

## 2024-07-31 RX ORDER — MUPIROCIN 20 MG/G
OINTMENT TOPICAL 2 TIMES DAILY
Qty: 22 G | Refills: 5 | Status: SHIPPED | OUTPATIENT
Start: 2024-07-31 | End: 2024-08-30

## 2024-08-11 DIAGNOSIS — R73.03 PREDIABETES: ICD-10-CM

## 2024-08-12 RX ORDER — METFORMIN HYDROCHLORIDE 850 MG/1
850 TABLET ORAL
Qty: 90 TABLET | Refills: 3 | Status: SHIPPED | OUTPATIENT
Start: 2024-08-12

## 2024-09-04 ENCOUNTER — PATIENT MESSAGE (OUTPATIENT)
Dept: FAMILY MEDICINE | Facility: CLINIC | Age: 49
End: 2024-09-04
Payer: COMMERCIAL

## 2024-09-04 DIAGNOSIS — Z79.899 LONG TERM USE OF DRUG: ICD-10-CM

## 2024-09-04 DIAGNOSIS — B20 HUMAN IMMUNODEFICIENCY VIRUS (HIV) DISEASE: Primary | ICD-10-CM

## 2024-10-07 ENCOUNTER — OFFICE VISIT (OUTPATIENT)
Dept: FAMILY MEDICINE | Facility: CLINIC | Age: 49
End: 2024-10-07
Payer: COMMERCIAL

## 2024-10-07 VITALS — DIASTOLIC BLOOD PRESSURE: 80 MMHG | SYSTOLIC BLOOD PRESSURE: 136 MMHG | OXYGEN SATURATION: 99 % | HEART RATE: 85 BPM

## 2024-10-07 DIAGNOSIS — N28.9 RENAL DISEASE: ICD-10-CM

## 2024-10-07 DIAGNOSIS — B20 HUMAN IMMUNODEFICIENCY VIRUS (HIV) DISEASE: Primary | ICD-10-CM

## 2024-10-07 DIAGNOSIS — A51.49 SECONDARY SYPHILIS: ICD-10-CM

## 2024-10-07 PROCEDURE — 3044F HG A1C LEVEL LT 7.0%: CPT | Mod: CPTII,S$GLB,, | Performed by: NURSE PRACTITIONER

## 2024-10-07 PROCEDURE — 99213 OFFICE O/P EST LOW 20 MIN: CPT | Mod: S$GLB,,, | Performed by: NURSE PRACTITIONER

## 2024-10-07 PROCEDURE — 3075F SYST BP GE 130 - 139MM HG: CPT | Mod: CPTII,S$GLB,, | Performed by: NURSE PRACTITIONER

## 2024-10-07 PROCEDURE — 1159F MED LIST DOCD IN RCRD: CPT | Mod: CPTII,S$GLB,, | Performed by: NURSE PRACTITIONER

## 2024-10-07 PROCEDURE — 3079F DIAST BP 80-89 MM HG: CPT | Mod: CPTII,S$GLB,, | Performed by: NURSE PRACTITIONER

## 2024-10-07 NOTE — PROGRESS NOTES
Infectious Diseases Clinic Note    Subjective:       Patient ID: Carl Lorenzo is a 48 y.o. male     Chief Complaint: Follow-up and Results        Carl Lorenzo is a 48 y.o. male here today for lab discussion.     He is currently on Triumeq.  He is 100% compliant with meds. He has not missed any doses in the past month. He did miss 1-2 doses in March.  No side effects reported.  He had labs done prior to arrival and is here to discuss.  Denies opportunistic infections.  Denies fever chills myalgias lymphadenopathy.  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       No other issues reported at this time.                  Past Medical History:   Diagnosis Date    GERD (gastroesophageal reflux disease)     HIV (human immunodeficiency virus infection) 01/01/2004    Hypertension        Social History     Socioeconomic History    Marital status:    Tobacco Use    Smoking status: Never    Smokeless tobacco: Never   Substance and Sexual Activity    Alcohol use: Yes         Current Outpatient Medications:     abacavir-dolutegravir-lamivud (TRIUMEQ) 600- mg Tab, Take 1 tablet by mouth once daily., Disp: 90 tablet, Rfl: 3    abacavir-dolutegravir-lamivud (TRIUMEQ) 600- mg Tab, Take 1 tablet by mouth once daily., Disp: 90 tablet, Rfl: 3    ascorbic acid, vitamin C, (VITAMIN C) 100 MG tablet, Take 100 mg by mouth once daily., Disp: , Rfl:     b complex vitamins capsule, Take 1 capsule by mouth once daily., Disp: , Rfl:     blood sugar diagnostic Strp, To check BG 1 times daily, to use with insurance preferred meter, Disp: 100 each, Rfl: 5    blood-glucose meter (ONETOUCH VERIO FLEX METER) Misc, 1 each by Misc.(Non-Drug; Combo Route) route every morning., Disp: 1 each, Rfl: 0    dexchlorphen-phenylephrine-DM 1-5-10 mg/5 mL Syrp, Take 10 mLs by mouth 4 (four) times daily as needed (cough/congestion)., Disp: 120 mL, Rfl: 0    hepatitis A and B vaccine, PF, (TWINRIX) 720 NACHO unit- 20 mcg/mL Syrg  suspension, 1 mL given IM on a 0-, 1-, and 6-month schedule for a total of 3 doses, Disp: 1 mL, Rfl: 2    lancets Misc, To check BG 1 times daily, to use with insurance preferred meter, Disp: 100 each, Rfl: 5    LORazepam (ATIVAN) 0.5 MG tablet, Take 1 tablet (0.5 mg total) by mouth daily as needed for Anxiety., Disp: 90 tablet, Rfl: 1    metFORMIN (GLUCOPHAGE) 850 MG tablet, TAKE 1 TABLET DAILY WITH BREAKFAST, Disp: 90 tablet, Rfl: 3    omeprazole (PRILOSEC) 40 MG capsule, Take 1 capsule (40 mg total) by mouth every morning., Disp: 90 capsule, Rfl: 3    pravastatin (PRAVACHOL) 10 MG tablet, Take 1 tablet (10 mg total) by mouth every evening., Disp: 90 tablet, Rfl: 3    sildenafiL (VIAGRA) 50 MG tablet, TAKE 1 TABLET DAILY AS NEEDED FOR ERECTILE DYSFUNCTION, Disp: 90 tablet, Rfl: 3    sildenafiL (VIAGRA) 50 MG tablet, Take 1 tablet (50 mg total) by mouth daily as needed for Erectile Dysfunction., Disp: 90 tablet, Rfl: 1    sod sulf-pot chloride-mag sulf (SUTAB) 1.479-0.188- 0.225 gram tablet, Take 12 tablets by mouth once daily. Take according to package instructions with indicated amount of water., Disp: 24 tablet, Rfl: 0    sod sulf-pot chloride-mag sulf (SUTAB) 1.479-0.188- 0.225 gram tablet, Take 12 tablets by mouth once daily. Take according to package instructions with indicated amount of water. No breakfast day before test. May substitute with Suprep, Clenpiq, Plenvu, Moviprep or GoLytely based on Rx plan and patient preference., Disp: 24 tablet, Rfl: 0    triamcinolone acetonide 0.1% (KENALOG) 0.1 % cream, Apply topically 2 (two) times daily. Apply to affected area, Disp: 45 g, Rfl: 2    valACYclovir (VALTREX) 500 MG tablet, TAKE 1 TABLET BY MOUTH TWICE A DAY, Disp: 180 tablet, Rfl: 3    vitamin E 100 UNIT capsule, Take 100 Units by mouth once daily., Disp: , Rfl:     Review of Systems   Constitutional:  Negative for activity change, chills, diaphoresis, fatigue and fever.   HENT:  Negative for ear pain,  mouth sores, nosebleeds and trouble swallowing.    Eyes:  Negative for pain and visual disturbance.   Respiratory:  Negative for cough, chest tightness and shortness of breath.    Cardiovascular:  Negative for chest pain, palpitations and leg swelling.   Gastrointestinal:  Negative for abdominal distention, abdominal pain and blood in stool.   Endocrine: Negative for polydipsia, polyphagia and polyuria.   Genitourinary:  Negative for flank pain, frequency, genital sores, penile discharge and testicular pain.   Musculoskeletal:  Negative for gait problem, joint swelling, neck pain and neck stiffness.   Skin:  Negative for color change, pallor and rash.   Allergic/Immunologic: Negative for immunocompromised state.   Neurological:  Negative for dizziness, syncope, light-headedness and headaches.   Hematological:  Negative for adenopathy. Does not bruise/bleed easily.   Psychiatric/Behavioral:  Negative for confusion, dysphoric mood, hallucinations, self-injury, sleep disturbance and suicidal ideas. The patient is not nervous/anxious and is not hyperactive.            Objective:      Vitals:    10/07/24 1334   BP: 136/80   Pulse: 85     Physical Exam  Vitals and nursing note reviewed.   Constitutional:       General: He is not in acute distress.     Appearance: Normal appearance. He is not ill-appearing.   HENT:      Head: Normocephalic and atraumatic.   Cardiovascular:      Rate and Rhythm: Normal rate and regular rhythm.   Pulmonary:      Effort: Pulmonary effort is normal.      Breath sounds: Normal breath sounds.   Abdominal:      General: Abdomen is flat.   Musculoskeletal:         General: Normal range of motion.      Cervical back: Normal range of motion.   Skin:     General: Skin is warm.   Neurological:      Mental Status: He is alert and oriented to person, place, and time. Mental status is at baseline.   Psychiatric:         Mood and Affect: Mood normal.         Behavior: Behavior normal.         Thought  Content: Thought content normal.         Judgment: Judgment normal.             Assessment/Plan:       1. Human immunodeficiency virus (HIV) disease    2. Secondary syphilis    3. Renal disease      Problem List Items Addressed This Visit          Renal/    Renal disease    Current Assessment & Plan       The patient has an elevated creatinine with a normal GFR. His ultrasound showed medical renal disease. He has been referred to nephrology            ID    Human immunodeficiency virus (HIV) disease - Primary    Current Assessment & Plan     Component Ref Range & Units 7 d ago 5 mo ago 11 mo ago   Copies/mL NOT DETECTED copies/mL NOT DETECTED 27 High  NOT DETECTED   Log copies/mL NOT DETECTED Log copies/mL NOT DETECTED 1.43 High  CM NOT DETECTED CM   Comment:               Component Ref Range & Units 7 d ago 5 mo ago 11 mo ago   % CD4 ([place] Cells) 30 - 61 % 43 39 42   Absolute CD4 + Cells 490 - 1,740 cells/uL [phone]   CD8 % 12 - 42 % 38 42 42   Absolute CD8+Cells 180 - 1,170 cells/uL [phone]   CD4/CD8 Ratio 0.86 - 5.00 1.12 0.92 1.01   Absolute Lymphocytes 850 - 3,900 cells/uL 2,072 2,275 CM 2,259 CM           PLAN     1. Continue current therapy w/ Triumeq.  Viral load returned to undetectable.  2. We will continue to monitor viral load... if still elevated, would recommend ruling out H Pylori at a later date.   3. Fu in 6 mts.          Secondary syphilis    Current Assessment & Plan          PLAN     1. Treatment successful. RPR titer nonreactive              No visits with results within 1 Month(s) from this visit.   Latest known visit with results is:   Patient Message on 09/04/2024   Component Date Value Ref Range Status    WBC 09/16/2024 6.72  4.3 - 10.8 X 10 3/ul Final    RBC 09/16/2024 4.40 (L)  4.7 - 6.1 X 10 6/ul Final    RDW-SD 09/16/2024 43.6  37 - 54 fl Final    Hemoglobin 09/16/2024 14.9  14 - 18 g/dL Final    Hematocrit 09/16/2024 42.7  42 - 52 % Final    MCV 09/16/2024 97.0 (H)  80 - 94  fl Final    MCH 09/16/2024 33.9 (H)  27 - 32 pg Final    MCHC 09/16/2024 34.9  32 - 36 g/dL Final    Platelets 09/16/2024 159  135 - 400 X 10 3/ul Final    Neutrophils 09/16/2024 62.5  34 - 71.1 % Final    Lymphocytes 09/16/2024 29.6  19.3 - 53.1 % Final    Monocytes 09/16/2024 6.5  4.7 - 12.5 % Final    Eosinophils 09/16/2024 0.7  0.7 - 7.0 % Final    Basophils 09/16/2024 0.3  0.2 - 1.2 % Final    Neutrophils Absolute 09/16/2024 4.19  2.15 - 7.56 X 10 3/ul Final    Lymphocytes Absolute 09/16/2024 1.99  0.86 - 4.75 X 10 3/ul Final    Monocytes Absolute 09/16/2024 0.44  0.22 - 1.08 X 10 3/ul Final    Eosinophils Absolute 09/16/2024 0.05  0.04 - 0.54 X 10 3/ul Final    Basophils Absolute 09/16/2024 0.02  0.00 - 0.22 X 10 3/ul Final    Immature Granulocytes Absolute 09/16/2024 0.03  0 - 0.04 X 10 3/ul Final    Immature Granulocytes 09/16/2024 0.4  0 - 0.5 % Final    IG includes metamyelocytes, myelocytes, and promyelocytes    nRBC# 09/16/2024 0.0  0 - 0.2 /100 WBC Final    nRBC Count Absolute 09/16/2024 0.000  0 - 0.012 x 10 3/ul Final    Comment: NOTE  Testing performed at:  [facility], [address] [place] [address]  [address] CLIA #:[id number]      Glucose 09/16/2024 98  74 - 106 mg/dL Final    BUN 09/16/2024 21.1 (H)  6 - 20 mg/dL Final    Creatinine 09/16/2024 1.25 (H)  0.70 - 1.20 mg/dL Final    Recommend repeat creatinine within 90 days.    AST 09/16/2024 20  0 - 40 U/L Final    ALT (SGPT) 09/16/2024 15  0 - 41 U/L Final    Alkaline Phosphatase 09/16/2024 59  40 - 130 U/L Final    Calcium 09/16/2024 9.7  8.6 - 10.2 mg/dL Final    Protein, Total 09/16/2024 7.5  6.4 - 8.3 g/dL Final    Albumin 09/16/2024 4.8  3.5 - 5.2 g/dL Final    BILIRUBIN, TOTAL 09/16/2024 0.55  0.00 - 1.20 mg/dL Final    Sodium 09/16/2024 140  136 - 145 mmol/L Final    Potassium 09/16/2024 4.2  3.5 - 5.1 mmol/L Final    Chloride 09/16/2024 100  98 - 107 mmol/L Final    CO2 09/16/2024 21 (L)  22 - 29 mmol/L Final    Globulin  09/16/2024 2.7  1.5 - 4.5 g/dL Final    Albumin/Globulin Ratio 09/16/2024 1.8  1.0 - 2.7 Final    BUN/Creatinine Ratio 09/16/2024 16.9  6 - 20 Final    GFR ESTIMATION 09/16/2024 70.15  >60.00 mL/min/1.73m2 Final    Anion Gap 09/16/2024 19.0 (H)  8.0 - 17.0 mmol/L Final    Comment: NOTE  Testing performed at:  [facility], [address] [place] [address]  [address] CLIA #:[id number]      Copies/mL 09/16/2024 NOT DETECTED  NOT DETECTED copies/mL Final    Log copies/mL 09/16/2024 NOT DETECTED  NOT DETECTED Log copies/mL Final    Comment:   This test was performed using Real-Time Polymerase Chain  Reaction.    Reportable Range: 20 copies/mL to 10,000,000 copies/mL  (1.30 log copies/mL to 7.00 log copies/mL).  NOTE  Testing performed at:  [facility], [address] [place] [id number] CLIA #: [id number]      Syphilis Treponemal Ab 09/16/2024 REACTIVE (A)  NONREACTIVE Final    Comment: Reactive treponemal test results alone are not diagnostic of  syphilis.  Treponemal test results should always be interpreted in conjunction  with additional treponemal or nontreponemal serologic test results,  medical history, and clinical presentation as recommended by the CDC.  Therefore an RPR has been added to this sample to aid in the  diagnosis.  This immunoassay is for the in vitro detection of total antibodies  (IgG and IgM) to Treponema pallidum in human serum.  NOTE  Testing performed at:  [facility], [address] [place] [address]  [address] CLIA #:[id number]      Additional Testing 09/16/2024 SEE BELOW   Final    Comment: Additional testing was collected and sent to a reference lab. Results  may take 7 days to 2 weeks before they are final. Reports will be sent  to ordering client via fax, pathviewer, mail, interface or   delivered.  NOTE  Testing performed at:  [facility], [address] [place] [address]  [address] CLIA #:[id number]      RPR Reflex 09/16/2024 NON  REACTIVE  NON REACTIVE Final    Comment: Confirmatory TP-PA test is being sent out per CDC guidelines. Do not  interpret until TP-PA test is resulted.  This is being performed as a reflex for a reactive treponemal Ab test.  NOTE  Testing performed at:  [facility], [address] [place]  [address] CLIA #:[id number]      % CD4 ([place] Cells) 09/16/2024 43  30 - 61 % Final    Absolute CD4 + Cells 09/16/2024 893  490 - 1,740 cells/uL Final    CD8 % 09/16/2024 38  12 - 42 % Final    Absolute CD8+Cells 09/16/2024 794  180 - 1,170 cells/uL Final    CD4/CD8 Ratio 09/16/2024 1.12  0.86 - 5.00 Final    Absolute Lymphocytes 09/16/2024 2,072  850 - 3,900 cells/uL Final    Comment: NOTE  Testing performed at:  [facility], [id number] [place] [id number] CLIA #: [id number]        No results found in the last 30 days.      Duration of encounter: minutes  This includes face-to-face time and non face-to-face time preparing to see the patient (eg, review of tests), obtaining and/or reviewing separately obtained history, documenting clinical information in the electronic or other health record, independently interpreting resultsand communicating results to the patient/family/caregiver, or care coordination.      All diagnostic data (labs/imaging) was reviewed with the patient and/or family member in the room.  All questions were answered to their liking. The patient and/or family member voiced understanding of all instructions provided. Expectations regarding follow up and treatment plan were voiced and confirmed prior to departure. The patient was given orders/instructions at the end of the visit for reference. They were instructed to notify my office if they have not been contacted for imaging/referrals/labs/results in 1-2 weeks. They voiced understanding of all of the above.     DISCLAIMER: This note was prepared with [employer] voice recognition transcription software. Garbled syntax, mangled  pronouns, and other bizarre constructions may be attributed to that software system.     Follow up:     There are no Patient Instructions on file for this visit.     Follow up in about 6 months (around 4/7/2025), or if symptoms worsen or fail to improve.

## 2024-10-07 NOTE — ASSESSMENT & PLAN NOTE
Component Ref Range & Units 7 d ago 5 mo ago 11 mo ago   Copies/mL NOT DETECTED copies/mL NOT DETECTED 27 High  NOT DETECTED   Log copies/mL NOT DETECTED Log copies/mL NOT DETECTED 1.43 High  CM NOT DETECTED CM   Comment:               Component Ref Range & Units 7 d ago 5 mo ago 11 mo ago   % CD4 ([place] Cells) 30 - 61 % 43 39 42   Absolute CD4 + Cells 490 - 1,740 cells/uL 893 880 948   CD8 % 12 - 42 % 38 42 42   Absolute CD8+Cells 180 - 1,170 cells/uL 794 959 939   CD4/CD8 Ratio 0.86 - 5.00 1.12 0.92 1.01   Absolute Lymphocytes 850 - 3,900 cells/uL 2,072 2,275 CM 2,259 CM           PLAN     1. Continue current therapy w/ Triumeq.  Viral load returned to undetectable.  2. We will continue to monitor viral load... if still elevated, would recommend ruling out H Pylori at a later date.   3. Fu in 6 mts.

## 2024-10-07 NOTE — ASSESSMENT & PLAN NOTE
The patient has an elevated creatinine with a normal GFR. His ultrasound showed medical renal disease. He has been referred to nephrology

## 2024-10-17 DIAGNOSIS — R76.8 HSV-2 SEROPOSITIVE: ICD-10-CM

## 2024-10-17 RX ORDER — VALACYCLOVIR HYDROCHLORIDE 500 MG/1
500 TABLET, FILM COATED ORAL 2 TIMES DAILY
Qty: 180 TABLET | Refills: 3 | Status: SHIPPED | OUTPATIENT
Start: 2024-10-17

## 2024-10-21 RX ORDER — PRAVASTATIN SODIUM 10 MG/1
10 TABLET ORAL NIGHTLY
Qty: 90 TABLET | Refills: 3 | Status: SHIPPED | OUTPATIENT
Start: 2024-10-21

## 2024-10-21 RX ORDER — OMEPRAZOLE 40 MG/1
40 CAPSULE, DELAYED RELEASE ORAL EVERY MORNING
Qty: 90 CAPSULE | Refills: 3 | Status: SHIPPED | OUTPATIENT
Start: 2024-10-21

## 2024-10-24 ENCOUNTER — PATIENT MESSAGE (OUTPATIENT)
Dept: ADMINISTRATIVE | Facility: HOSPITAL | Age: 49
End: 2024-10-24
Payer: COMMERCIAL

## 2024-10-26 DIAGNOSIS — B20 HUMAN IMMUNODEFICIENCY VIRUS (HIV) DISEASE: ICD-10-CM

## 2024-10-28 RX ORDER — ABACAVIR SULFATE, DOLUTEGRAVIR SODIUM, LAMIVUDINE 600; 50; 300 MG/1; MG/1; MG/1
1 TABLET, FILM COATED ORAL
Qty: 90 TABLET | Refills: 3 | Status: SHIPPED | OUTPATIENT
Start: 2024-10-28

## 2024-11-05 ENCOUNTER — PATIENT MESSAGE (OUTPATIENT)
Dept: FAMILY MEDICINE | Facility: CLINIC | Age: 49
End: 2024-11-05
Payer: COMMERCIAL

## 2024-11-05 DIAGNOSIS — B20 HUMAN IMMUNODEFICIENCY VIRUS (HIV) DISEASE: ICD-10-CM

## 2024-11-05 DIAGNOSIS — Z12.11 SCREENING FOR MALIGNANT NEOPLASM OF COLON: Primary | ICD-10-CM

## 2024-11-14 ENCOUNTER — PATIENT MESSAGE (OUTPATIENT)
Dept: FAMILY MEDICINE | Facility: CLINIC | Age: 49
End: 2024-11-14
Payer: COMMERCIAL

## 2024-11-15 RX ORDER — MINOCYCLINE HYDROCHLORIDE 100 MG/1
100 CAPSULE ORAL EVERY 12 HOURS
Qty: 20 CAPSULE | Refills: 0 | Status: SHIPPED | OUTPATIENT
Start: 2024-11-15 | End: 2024-11-25

## 2024-11-16 DIAGNOSIS — G47.00 INSOMNIA, UNSPECIFIED TYPE: ICD-10-CM

## 2024-11-18 RX ORDER — LORAZEPAM 0.5 MG/1
TABLET ORAL
Qty: 90 TABLET | Refills: 1 | Status: SHIPPED | OUTPATIENT
Start: 2024-11-18 | End: 2024-11-20

## 2024-11-20 DIAGNOSIS — G47.00 INSOMNIA, UNSPECIFIED TYPE: ICD-10-CM

## 2024-11-20 RX ORDER — LORAZEPAM 0.5 MG/1
0.5 TABLET ORAL
Qty: 30 TABLET | Refills: 0 | Status: SHIPPED | OUTPATIENT
Start: 2024-11-20

## 2024-11-25 ENCOUNTER — PATIENT MESSAGE (OUTPATIENT)
Dept: PRIMARY CARE CLINIC | Facility: CLINIC | Age: 49
End: 2024-11-25
Payer: COMMERCIAL

## 2025-01-09 ENCOUNTER — PATIENT MESSAGE (OUTPATIENT)
Dept: FAMILY MEDICINE | Facility: CLINIC | Age: 50
End: 2025-01-09
Payer: COMMERCIAL

## 2025-01-09 DIAGNOSIS — Z12.11 SCREENING FOR MALIGNANT NEOPLASM OF COLON: Primary | ICD-10-CM

## 2025-01-28 RX ORDER — TRIAMCINOLONE ACETONIDE 1 MG/G
CREAM TOPICAL
Qty: 45 G | Refills: 2 | Status: SHIPPED | OUTPATIENT
Start: 2025-01-28

## 2025-03-06 ENCOUNTER — PATIENT MESSAGE (OUTPATIENT)
Dept: FAMILY MEDICINE | Facility: CLINIC | Age: 50
End: 2025-03-06
Payer: COMMERCIAL

## 2025-03-06 DIAGNOSIS — N28.9 RENAL DISEASE: ICD-10-CM

## 2025-03-06 DIAGNOSIS — B20 HUMAN IMMUNODEFICIENCY VIRUS (HIV) DISEASE: Primary | ICD-10-CM

## 2025-03-06 DIAGNOSIS — A51.49 SECONDARY SYPHILIS: ICD-10-CM

## 2025-03-06 DIAGNOSIS — R73.03 PREDIABETES: ICD-10-CM

## 2025-03-06 DIAGNOSIS — K21.9 GASTROESOPHAGEAL REFLUX DISEASE WITHOUT ESOPHAGITIS: ICD-10-CM

## 2025-03-25 ENCOUNTER — PATIENT MESSAGE (OUTPATIENT)
Dept: FAMILY MEDICINE | Facility: CLINIC | Age: 50
End: 2025-03-25
Payer: COMMERCIAL

## 2025-03-25 DIAGNOSIS — B20 HUMAN IMMUNODEFICIENCY VIRUS (HIV) DISEASE: Primary | ICD-10-CM

## 2025-03-25 RX ORDER — SULFAMETHOXAZOLE AND TRIMETHOPRIM 800; 160 MG/1; MG/1
1 TABLET ORAL 2 TIMES DAILY
Qty: 14 TABLET | Refills: 0 | Status: SHIPPED | OUTPATIENT
Start: 2025-03-25 | End: 2025-04-01

## 2025-04-02 ENCOUNTER — RESULTS FOLLOW-UP (OUTPATIENT)
Dept: FAMILY MEDICINE | Facility: CLINIC | Age: 50
End: 2025-04-02

## 2025-04-29 ENCOUNTER — PATIENT MESSAGE (OUTPATIENT)
Dept: FAMILY MEDICINE | Facility: CLINIC | Age: 50
End: 2025-04-29
Payer: COMMERCIAL

## 2025-04-29 NOTE — TELEPHONE ENCOUNTER
Call him and see if he has drainage from the abscess. IF he does then I want him to come in so I can culture it. Can be tomorrow if he can't come today.

## 2025-05-11 DIAGNOSIS — R73.03 PREDIABETES: ICD-10-CM

## 2025-05-12 RX ORDER — METFORMIN HYDROCHLORIDE 850 MG/1
850 TABLET ORAL
Qty: 90 TABLET | Refills: 3 | Status: SHIPPED | OUTPATIENT
Start: 2025-05-12

## 2025-05-21 ENCOUNTER — OFFICE VISIT (OUTPATIENT)
Dept: FAMILY MEDICINE | Facility: CLINIC | Age: 50
End: 2025-05-21
Payer: COMMERCIAL

## 2025-05-21 VITALS
HEART RATE: 75 BPM | WEIGHT: 162 LBS | DIASTOLIC BLOOD PRESSURE: 83 MMHG | SYSTOLIC BLOOD PRESSURE: 138 MMHG | OXYGEN SATURATION: 99 % | BODY MASS INDEX: 22.59 KG/M2

## 2025-05-21 DIAGNOSIS — Z12.11 SCREENING FOR COLORECTAL CANCER: ICD-10-CM

## 2025-05-21 DIAGNOSIS — Z12.5 SCREENING FOR MALIGNANT NEOPLASM OF PROSTATE: ICD-10-CM

## 2025-05-21 DIAGNOSIS — Z12.12 SCREENING FOR COLORECTAL CANCER: ICD-10-CM

## 2025-05-21 DIAGNOSIS — D80.3 SELECTIVE DEFICIENCY OF IGG SUBCLASSES: ICD-10-CM

## 2025-05-21 DIAGNOSIS — Z13.1 SCREENING FOR DIABETES MELLITUS: ICD-10-CM

## 2025-05-21 DIAGNOSIS — B20 HUMAN IMMUNODEFICIENCY VIRUS (HIV) DISEASE: ICD-10-CM

## 2025-05-21 DIAGNOSIS — Z00.00 PREVENTATIVE HEALTH CARE: Primary | ICD-10-CM

## 2025-05-21 DIAGNOSIS — Z79.899 LONG TERM USE OF DRUG: ICD-10-CM

## 2025-05-21 NOTE — PATIENT INSTRUCTIONS
"MRSA EDUCATION       What are symptoms of MRSA Infection?   The symptoms of a MRSA infection depend on the part of the body that is infected. For example, people with MRSA skin infections often can get swelling, warmth, redness, and pain in infected skin. In most cases it is hard to tell if an infection is due to MRSA or another type of bacteria without laboratory tests that your doctor can order. Some MRSA skin infections can have a fairly typical appearance and can be confused with a spider bite. However, unless you actually see the spider, the irritation is likely not a spider bite.  Most S. aureus skin infections, including MRSA, appear as a bump or infected area on the skin that might be:  red   swollen   painful   warm to the touch   full of pus or other drainage   accompanied by a fever    HOW IS MRSA SPREAD?  You can be "colonized" with MRSA, meaning that you carry the bacteria on your skin or in your nose but you have no signs or symptoms of the illness. You can become colonized with MRSA in a variety of ways:    ?By touching the skin of another person who is colonized with MRSA  ?By touching a contaminated surface (such as a countertop, door handle, or phone)    MRSA RISK FACTORS  Anyone can become colonized and then infected with MRSA, although certain people are at a higher risk.    Hospital care -- Risk factors for becoming infected with hospital-associated MRSA include the following:    ?Having a surgical wound and/or intravenous (IV) line  ?Being hospitalized for a prolonged period of time  ?Recent use of antibiotics  ?Having a weakened immune system due to a medical condition or its treatment (eg, receiving medications that weaken the immune system)  ?Being in close proximity to other patients, family members, or health care workers who are colonized with MRSA    In hospitals and other long-term health care facilities, MRSA can be spread from one patient to another on the hands of health care " "workers. Hands may become contaminated with MRSA when health care workers touch a patient's skin, wounds, wound dressings, or devices, such as IV tubing.    You can develop an infection from MRSA if your skin is colonized and the bacteria enter an opening (eg, a cut, scrape, or wound) in the skin.    Community-associated MRSA -- You can  MRSA outside the hospital, especially if you:    ?Have skin trauma (eg, "turf burns," cuts, or sores)  ?Are an athlete  ?Shave or wax to remove body hair, particularly of the armpits and groin  ?Have tattoos or body piercing  ?Have physical contact with a person who has a draining cut or sore or is a carrier of MRSA  ?Share personal items or equipment that is not cleaned or laundered between users (such as towels or protective sport pads)    Community-associated MRSA infections may occur more commonly in certain populations, such as  centers, prisons, in the , or in athletes who play on a team. Spread of MRSA within households is common.    Prevention in the community -- The best way to prevent and control MRSA in the community is not clear. The [place] Centers for Disease Control and Prevention has made the following recommendations [3]    ?Keep hands clean by washing thoroughly with soap and water. Hands should be wet with water and plain soap and be rubbed together for 15 to 30 seconds. Special attention should be paid to the fingernails, between the fingers, and the wrists. Hands should be rinsed thoroughly and dried with a single-use towel (eg, paper towels).  ?Alcohol-based hand sanitizers are a good alternative for disinfecting hands if a sink is not available. Hand sanitizers should be rubbed over the entire surface of hands, fingers, and wrists until dry and may be used several times. Hand sanitizers are available as a liquid or wipe in small, portable sizes that are easy to carry in a pocket or handbag. When a sink is available, visibly soiled " hands should be washed with soap and water.  ?Keep cuts and scrapes clean, dry, and covered with a bandage until healed.  ?Avoid touching other people's wounds or bandages.  ?Avoid sharing personal items such as towels, washcloths, razors, clothing, or uniforms.  Other items that should not be shared include brushes, yanes, and makeup.  ?Students who participate in team sports should shower after every athletic activity using soap and clean towels. Athletes with skin infections should receive prompt treatment and should not compete when they have draining or active skin infections.  ?People who use exercise machines at sports clubs or schools should be sure to wipe down the equipment, including the hand , with an alcohol-based solution after using it.        DECOLONIZATION:      Nasal decolonization with mupirocin ointment (2%) applied to nares twice daily for 1 month, and    ?Topical body decolonization (BOTH of the following):  Chlorhexidine gluconate (2% or 4% solution): daily washes and NONSCENTED DIAL SOAP  Dilute bleach baths (one teaspoon bleach per gallon of water, or one-fourth cup bleach per one-fourth tub [approximately 13 gallons of water] for 15 minutes twice weekly) for approximately ONE months            How can I clean and disinfect surfaces to prevent MRSA infection?      or detergents are products that remove soil, dirt, dust, organic matter, and germs (like bacteria, viruses, and fungi). They lift dirt and germs off surfaces so they can be rinsed away with water. Cleaning with a detergent is necessary to remove dirt that can prevent disinfectants from working. Some disinfectants have a cleaning agent mixed in, check the label to know which product you have.   Disinfectants are chemical products that are used to kill germs in healthcare settings. Disinfectants effective against Staphylococcus aureus, or staph, are also effective against MRSA. The disinfectant's label should have a list  of germs that the product can kill, along with an Environmental Protection Agency (EPA) registration number. These products are also sold at grocery and other retail stores and may be helpful when someone has an infected wound.    What should I clean to prevent MRSA from spreading?     When cleaning and disinfecting, focus on surfaces that frequently contact people's bare skin like desks, chairs, benches, gym equipment, lockers, faucets, light switches and remote controls. In particular, clean any surfaces that could come into contact with uncovered wounds, cuts, or boils. In addition to cleaning surfaces, frequently cleaning hands and keeping wounds covered keeps MRSA from spreading.  Large surfaces, such as floors and walls, have not been associated with the spread of staph and MRSA. There is no evidence that spraying or fogging rooms or surfaces with disinfectants will prevent MRSA infections more effectively than the targeted approach of cleaning frequently touched surfaces and surfaces that have been exposed to open wounds.    What if I see these symptoms?     You cannot tell by looking at the skin if it's a staph infection (including MRSA).  Getting medical care early makes it less likely that the infection will become serious.  If you or someone in your family experiences the signs and symptoms of MRSA:  Contact your healthcare provider, especially if the symptoms are accompanied by a fever.   Do not pick at or pop the sore.   Cover the area with clean, dry bandages until you can see a healthcare provider.   Clean your hands often.

## 2025-05-21 NOTE — PROGRESS NOTES
Subjective:      Patient ID: Carl Lorenzo is a 49 y.o. male.    Chief Complaint: Follow-up      Carl Lorenzo is a 48 y.o. male here today for AWV.  Due for colonoscopy. No acute issues reported.     He is currently on Triumeq.  He is 100% compliant with meds. He has not missed any doses in the past month. He did miss 1-2 doses in March.  No side effects reported.  He had labs done prior to arrival and is here to discuss.  Denies opportunistic infections.  Denies fever chills myalgias lymphadenopathy.  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     He just completed antibiotics for abscess of the nose.  Reports improvement in the abscess.  It has resolved.  He has a history of recurrent infections. Tells me that he only took 6/10 days of medication.       No other issues reported at this time.         Past Medical History:   Diagnosis Date    GERD (gastroesophageal reflux disease)     HIV (human immunodeficiency virus infection) 01/01/2004    Hypertension       Social History     Socioeconomic History    Marital status:    Tobacco Use    Smoking status: Never    Smokeless tobacco: Never   Substance and Sexual Activity    Alcohol use: Yes      Family History   Problem Relation Name Age of Onset    Thyroid disease Mother      Diabetes Mother      Diabetes Father          ROS:   Review of Systems   Constitutional:  Negative for activity change, chills, diaphoresis, fatigue and fever.   HENT:  Negative for ear pain, mouth sores, nosebleeds and trouble swallowing.    Eyes:  Negative for pain and visual disturbance.   Respiratory:  Negative for cough, chest tightness and shortness of breath.    Cardiovascular:  Negative for chest pain, palpitations and leg swelling.   Gastrointestinal:  Negative for abdominal distention, abdominal pain and blood in stool.   Endocrine: Negative for polydipsia, polyphagia and polyuria.   Genitourinary:  Negative for flank pain, frequency, genital sores, penile  discharge and testicular pain.   Musculoskeletal:  Negative for gait problem, joint swelling, neck pain and neck stiffness.   Skin:  Negative for color change, pallor and rash.   Allergic/Immunologic: Negative for immunocompromised state.   Neurological:  Negative for dizziness, syncope, light-headedness and headaches.   Hematological:  Negative for adenopathy. Does not bruise/bleed easily.   Psychiatric/Behavioral:  Negative for confusion, dysphoric mood, hallucinations, self-injury, sleep disturbance and suicidal ideas. The patient is not nervous/anxious and is not hyperactive.      Objective:   Physical Exam  Vitals and nursing note reviewed.   Constitutional:       General: He is not in acute distress.     Appearance: Normal appearance. He is not ill-appearing.   HENT:      Head: Normocephalic and atraumatic.      Nose: Nose normal.      Mouth/Throat:      Mouth: Mucous membranes are moist.   Cardiovascular:      Rate and Rhythm: Normal rate and regular rhythm.   Pulmonary:      Effort: Pulmonary effort is normal.      Breath sounds: Normal breath sounds.   Abdominal:      General: Abdomen is flat.      Palpations: Abdomen is soft.   Musculoskeletal:         General: Normal range of motion.      Cervical back: Normal range of motion.   Skin:     General: Skin is warm.   Neurological:      Mental Status: He is alert and oriented to person, place, and time. Mental status is at baseline.   Psychiatric:         Mood and Affect: Mood normal.         Behavior: Behavior normal.         Thought Content: Thought content normal.         Judgment: Judgment normal.       Assessment:     1. Preventative health care    2. Human immunodeficiency virus (HIV) disease    3. Selective deficiency of IgG subclasses    4. Long term use of drug    5. Screening for malignant neoplasm of prostate    6. Screening for diabetes mellitus    7. Screening for colorectal cancer      No images are attached to the encounter.   Plan:     Problem  List Items Addressed This Visit          ID    Human immunodeficiency virus (HIV) disease    Current Assessment & Plan     PLAN     1. Continue current therapy w/ Triumeq.  Viral load returned to undetectable.  2. We will continue to monitor viral load... if still elevated, would recommend ruling out H Pylori at a later date.   3. Fu in 6 mts.          Relevant Orders    Prostate Specific Antigen, Diagnostic    Urinalysis, Reflex to Urine Culture Urine, Clean Catch    TSH+Free T4    Lipid Panel    Hemoglobin A1C    Comprehensive Metabolic Panel    CBC Auto Differential    HIV RNA, Quantitative, PCR    CD4 T-[place] Cells    Treponema Pallidum (Syphillis) Antibody       Immunology/Multi System    Selective deficiency of IgG subclasses    Overview   IGG SUBCLASSES 1,2,3,4 EZ    IGG SUBCLASS 1 351 L 382-929 mg/dL    IGG SUBCLASS 2 233 L 241-700 mg/dL    IGG SUBCLASS 3 39  mg/dL    IGG SUBCLASS 416.6 4-86 mg/dL  IMMUNOGLOBULIN G SERUM 549 426-1218 mg/dL           Current Assessment & Plan     PLAN     Recommend updating PCV 23. Repeat Vaccination in 5 years.   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 Will treat his infections aggressively in the future with prolonged course of ABX.   See AVS for education provided.          Relevant Orders    Prostate Specific Antigen, Diagnostic    Urinalysis, Reflex to Urine Culture Urine, Clean Catch    TSH+Free T4    Lipid Panel    Hemoglobin A1C    Comprehensive Metabolic Panel    CBC Auto Differential    HIV RNA, Quantitative, PCR    CD4 T-[place] Cells    Treponema Pallidum (Syphillis) Antibody     Other Visit Diagnoses         Preventative health care    -  Primary    Relevant Orders    Prostate Specific Antigen, Diagnostic    Urinalysis, Reflex to Urine Culture Urine, Clean Catch    TSH+Free T4    Lipid Panel    Hemoglobin A1C    Comprehensive Metabolic Panel    CBC Auto Differential    HIV RNA, Quantitative, PCR    CD4 T-[place] Cells    Treponema Pallidum (Syphillis)  Antibody      Long term use of drug        Relevant Orders    Prostate Specific Antigen, Diagnostic    Urinalysis, Reflex to Urine Culture Urine, Clean Catch    TSH+Free T4    Lipid Panel    Hemoglobin A1C    Comprehensive Metabolic Panel    CBC Auto Differential    HIV RNA, Quantitative, PCR    CD4 T-[place] Cells    Treponema Pallidum (Syphillis) Antibody      Screening for malignant neoplasm of prostate        Relevant Orders    Prostate Specific Antigen, Diagnostic    Urinalysis, Reflex to Urine Culture Urine, Clean Catch    TSH+Free T4    Lipid Panel    Hemoglobin A1C    Comprehensive Metabolic Panel    CBC Auto Differential    HIV RNA, Quantitative, PCR    CD4 T-[place] Cells    Treponema Pallidum (Syphillis) Antibody      Screening for diabetes mellitus        Relevant Orders    Prostate Specific Antigen, Diagnostic    Urinalysis, Reflex to Urine Culture Urine, Clean Catch    TSH+Free T4    Lipid Panel    Hemoglobin A1C    Comprehensive Metabolic Panel    CBC Auto Differential    HIV RNA, Quantitative, PCR    CD4 T-[place] Cells    Treponema Pallidum (Syphillis) Antibody      Screening for colorectal cancer        Relevant Orders    Ambulatory referral/consult to Gastroenterology    Prostate Specific Antigen, Diagnostic    Urinalysis, Reflex to Urine Culture Urine, Clean Catch    TSH+Free T4    Lipid Panel    Hemoglobin A1C    Comprehensive Metabolic Panel    CBC Auto Differential    HIV RNA, Quantitative, PCR    CD4 T-[place] Cells    Treponema Pallidum (Syphillis) Antibody    Ambulatory referral/consult to General Surgery          Procedures     No visits with results within 1 Month(s) from this visit.   Latest known visit with results is:   Patient Message on 03/06/2025   Component Date Value Ref Range Status    Glucose 03/17/2025 114 (H)  74 - 106 mg/dL Final    BUN 03/17/2025 22.1 (H)  6 - 20 mg/dL Final    Creatinine 03/17/2025 1.13  0.70 - 1.20 mg/dL Final    AST 03/17/2025 16  0 - 40 U/L Final    ALT  (SGPT) 03/17/2025 13  0 - 41 U/L Final    Alkaline Phosphatase 03/17/2025 69  40 - 130 U/L Final    Calcium 03/17/2025 9.8  8.6 - 10.2 mg/dL Final    Protein, Total 03/17/2025 7.1  6.4 - 8.3 g/dL Final    Albumin 03/17/2025 4.7  3.5 - 5.2 g/dL Final    BILIRUBIN, TOTAL 03/17/2025 0.51  0.00 - 1.20 mg/dL Final    Sodium 03/17/2025 143  136 - 145 mmol/L Final    Potassium 03/17/2025 4.4  3.5 - 5.1 mmol/L Final    Chloride 03/17/2025 103  98 - 107 mmol/L Final    CO2 03/17/2025 28  22 - 29 mmol/L Final    Globulin 03/17/2025 2.4  1.5 - 4.5 g/dL Final    Albumin/Globulin Ratio 03/17/2025 2.0  1.0 - 2.7 Final    BUN/Creatinine Ratio 03/17/2025 19.6  6 - 20 Final    GFR ESTIMATION 03/17/2025 78.69  >60.00 mL/min/1.73m2 Final    Anion Gap 03/17/2025 12.0  8.0 - 17.0 mmol/L Final    Comment: NOTE  Testing performed at:  [facility], [address] [place] [address] CLIA #:[id number]      WBC 03/17/2025 5.13  4.3 - 10.8 X 10 3/ul Final    RBC 03/17/2025 4.27 (L)  4.7 - 6.1 X 10 6/ul Final    RDW-SD 03/17/2025 46.2  37 - 54 fl Final    Hemoglobin 03/17/2025 14.5  14 - 18 g/dL Final    Hematocrit 03/17/2025 42.3  42 - 52 % Final    MCV 03/17/2025 99.1 (H)  80 - 94 fl Final    MCH 03/17/2025 34.0 (H)  27 - 32 pg Final    MCHC 03/17/2025 34.3  32 - 36 g/dL Final    Platelets 03/17/2025 162  135 - 400 X 10 3/ul Final    Neutrophils 03/17/2025 55.5  34 - 71.1 % Final    Lymphocytes 03/17/2025 35.1  19.3 - 53.1 % Final    Monocytes 03/17/2025 7.6  4.7 - 12.5 % Final    Eosinophils 03/17/2025 1.2  0.7 - 7.0 % Final    Basophils 03/17/2025 0.4  0.2 - 1.2 % Final    Neutrophils Absolute 03/17/2025 2.85  2.15 - 7.56 X 10 3/ul Final    Lymphocytes Absolute 03/17/2025 1.80  0.86 - 4.75 X 10 3/ul Final    Monocytes Absolute 03/17/2025 0.39  0.22 - 1.08 X 10 3/ul Final    Eosinophils Absolute 03/17/2025 0.06  0.04 - 0.54 X 10 3/ul Final    Basophils Absolute 03/17/2025 0.02  0.00 - 0.22 X 10 3/ul Final    Immature  Granulocytes Absolute 03/17/2025 0.01  0 - 0.04 X 10 3/ul Final    Immature Granulocytes 03/17/2025 0.2  0 - 0.5 % Final    IG includes metamyelocytes, myelocytes, and promyelocytes    nRBC# 03/17/2025 0.0  0 - 0.2 /100 WBC Final    nRBC Count Absolute 03/17/2025 0.000  0 - 0.012 x 10 3/ul Final    Comment: NOTE  Testing performed at:  [facility], [address] [place] [address]  [address] CLIA #:[id number]      Copies/mL 03/17/2025 NOT DETECTED  NOT DETECTED copies/mL Final    Log copies/mL 03/17/2025 NOT DETECTED  NOT DETECTED Log copies/mL Final    Comment:   This test was performed using Real-Time Polymerase Chain  Reaction.    Reportable Range: 20 copies/mL to 10,000,000 copies/mL  (1.30 log copies/mL to 7.00 log copies/mL).  NOTE  Testing performed at:  [facility], [address] [place] [id number] CLIA #: [id number]      Syphilis Treponemal Ab 03/17/2025 REACTIVE (A)  NONREACTIVE Final    Comment: Reactive treponemal test results alone are not diagnostic of  syphilis.  Treponemal test results should always be interpreted in conjunction  with additional treponemal or nontreponemal serologic test results,  medical history, and clinical presentation as recommended by the CDC.  Therefore an RPR has been added to this sample to aid in the  diagnosis.  This immunoassay is for the in vitro detection of total antibodies  (IgG and IgM) to Treponema pallidum in human serum.  NOTE  Testing performed at:  [facility], [address] [place] [address]  [id number] CLIA #:[id number]      RPR Reflex 03/17/2025 NON REACTIVE  NON REACTIVE Final    Comment: Confirmatory TP-PA test is being sent out per CDC guidelines. Do not  interpret until TP-PA test is resulted.  This is being performed as a reflex for a reactive treponemal Ab test.  NOTE  Testing performed at:  [facility], [address] [place] [address]  [address] CLIA #:[id number]      % CD4 ([place] Cells) 03/17/2025 38  30  - 61 % Final    Absolute CD4 + Cells 03/17/2025 707  490 - 1,740 cells/uL Final    CD8 % 03/17/2025 40  12 - 42 % Final    Absolute CD8+Cells 03/17/2025 750  180 - 1,170 cells/uL Final    CD4/CD8 Ratio 03/17/2025 0.94  0.86 - 5.00 Final    Absolute Lymphocytes 03/17/2025 1,852  850 - 3,900 cells/uL Final    Comment: NOTE  Testing performed at:  [facility], [address] [place] [address] [place] #: [id number]          No results found in the last 30 days.       Duration of encounter:  minutes  This includes face-to-face time and non face-to-face time preparing to see the patient (eg, review of tests), obtaining and/or reviewing separately obtained history, documenting clinical information in the electronic or other health record, independently interpreting resultsand communicating results to the patient/family/caregiver, or care coordination      DISCLAIMER: This note was prepared with [employer] voice recognition transcription software. Garbled syntax, mangled pronouns, and other bizarre constructions may be attributed to that software system.     All diagnostic data (labs/imaging) was reviewed with the patient and/or family member in the room. All preventative measures (vaccinations, screenings, testing, imaging) were discussed in depth and offered to the patient in accordance with current medical guidelines to ensure the patient is up to date.  All questions were answered to their liking. The patient and/or family member voiced understanding of all instructions provided. Expectations regarding follow up and treatment plan were voiced and confirmed prior to departure. The patient was given orders/instructions at the end of the visit for reference. They were instructed to notify my office if they have not been contacted for imaging/referrals/labs/results in 1-2 weeks. They voiced understanding of all of the above.     Follow up:     Patient Instructions   MRSA EDUCATION       What are symptoms of  "MRSA Infection?   The symptoms of a MRSA infection depend on the part of the body that is infected. For example, people with MRSA skin infections often can get swelling, warmth, redness, and pain in infected skin. In most cases it is hard to tell if an infection is due to MRSA or another type of bacteria without laboratory tests that your doctor can order. Some MRSA skin infections can have a fairly typical appearance and can be confused with a spider bite. However, unless you actually see the spider, the irritation is likely not a spider bite.  Most S. aureus skin infections, including MRSA, appear as a bump or infected area on the skin that might be:  red   swollen   painful   warm to the touch   full of pus or other drainage   accompanied by a fever    HOW IS MRSA SPREAD?  You can be "colonized" with MRSA, meaning that you carry the bacteria on your skin or in your nose but you have no signs or symptoms of the illness. You can become colonized with MRSA in a variety of ways:    ?By touching the skin of another person who is colonized with MRSA  ?By touching a contaminated surface (such as a countertop, door handle, or phone)    MRSA RISK FACTORS  Anyone can become colonized and then infected with MRSA, although certain people are at a higher risk.    Hospital care -- Risk factors for becoming infected with hospital-associated MRSA include the following:    ?Having a surgical wound and/or intravenous (IV) line  ?Being hospitalized for a prolonged period of time  ?Recent use of antibiotics  ?Having a weakened immune system due to a medical condition or its treatment (eg, receiving medications that weaken the immune system)  ?Being in close proximity to other patients, family members, or health care workers who are colonized with MRSA    In hospitals and other long-term health care facilities, MRSA can be spread from one patient to another on the hands of health care workers. Hands may become contaminated with MRSA " "when health care workers touch a patient's skin, wounds, wound dressings, or devices, such as IV tubing.    You can develop an infection from MRSA if your skin is colonized and the bacteria enter an opening (eg, a cut, scrape, or wound) in the skin.    Community-associated MRSA -- You can  MRSA outside the hospital, especially if you:    ?Have skin trauma (eg, "turf burns," cuts, or sores)  ?Are an athlete  ?Shave or wax to remove body hair, particularly of the armpits and groin  ?Have tattoos or body piercing  ?Have physical contact with a person who has a draining cut or sore or is a carrier of MRSA  ?Share personal items or equipment that is not cleaned or laundered between users (such as towels or protective sport pads)    Community-associated MRSA infections may occur more commonly in certain populations, such as  centers, prisons, in the , or in athletes who play on a team. Spread of MRSA within households is common.    Prevention in the community -- The best way to prevent and control MRSA in the community is not clear. The [place] Centers for Disease Control and Prevention has made the following recommendations [3]    ?Keep hands clean by washing thoroughly with soap and water. Hands should be wet with water and plain soap and be rubbed together for 15 to 30 seconds. Special attention should be paid to the fingernails, between the fingers, and the wrists. Hands should be rinsed thoroughly and dried with a single-use towel (eg, paper towels).  ?Alcohol-based hand sanitizers are a good alternative for disinfecting hands if a sink is not available. Hand sanitizers should be rubbed over the entire surface of hands, fingers, and wrists until dry and may be used several times. Hand sanitizers are available as a liquid or wipe in small, portable sizes that are easy to carry in a pocket or handbag. When a sink is available, visibly soiled hands should be washed with soap and water.  ?Keep " cuts and scrapes clean, dry, and covered with a bandage until healed.  ?Avoid touching other people's wounds or bandages.  ?Avoid sharing personal items such as towels, washcloths, razors, clothing, or uniforms.  Other items that should not be shared include brushes, yanes, and makeup.  ?Students who participate in team sports should shower after every athletic activity using soap and clean towels. Athletes with skin infections should receive prompt treatment and should not compete when they have draining or active skin infections.  ?People who use exercise machines at sports clubs or schools should be sure to wipe down the equipment, including the hand , with an alcohol-based solution after using it.        DECOLONIZATION:      Nasal decolonization with mupirocin ointment (2%) applied to nares twice daily for 1 month, and    ?Topical body decolonization (BOTH of the following):  Chlorhexidine gluconate (2% or 4% solution): daily washes and NONSCENTED DIAL SOAP  Dilute bleach baths (one teaspoon bleach per gallon of water, or one-fourth cup bleach per one-fourth tub [approximately 13 gallons of water] for 15 minutes twice weekly) for approximately ONE months            How can I clean and disinfect surfaces to prevent MRSA infection?      or detergents are products that remove soil, dirt, dust, organic matter, and germs (like bacteria, viruses, and fungi). They lift dirt and germs off surfaces so they can be rinsed away with water. Cleaning with a detergent is necessary to remove dirt that can prevent disinfectants from working. Some disinfectants have a cleaning agent mixed in, check the label to know which product you have.   Disinfectants are chemical products that are used to kill germs in healthcare settings. Disinfectants effective against Staphylococcus aureus, or staph, are also effective against MRSA. The disinfectant's label should have a list of germs that the product can kill, along with an  Environmental Protection Agency (EPA) registration number. These products are also sold at grocery and other retail stores and may be helpful when someone has an infected wound.    What should I clean to prevent MRSA from spreading?     When cleaning and disinfecting, focus on surfaces that frequently contact people's bare skin like desks, chairs, benches, gym equipment, lockers, faucets, light switches and remote controls. In particular, clean any surfaces that could come into contact with uncovered wounds, cuts, or boils. In addition to cleaning surfaces, frequently cleaning hands and keeping wounds covered keeps MRSA from spreading.  Large surfaces, such as floors and walls, have not been associated with the spread of staph and MRSA. There is no evidence that spraying or fogging rooms or surfaces with disinfectants will prevent MRSA infections more effectively than the targeted approach of cleaning frequently touched surfaces and surfaces that have been exposed to open wounds.    What if I see these symptoms?     You cannot tell by looking at the skin if it's a staph infection (including MRSA).  Getting medical care early makes it less likely that the infection will become serious.  If you or someone in your family experiences the signs and symptoms of MRSA:  Contact your healthcare provider, especially if the symptoms are accompanied by a fever.   Do not pick at or pop the sore.   Cover the area with clean, dry bandages until you can see a healthcare provider.   Clean your hands often.          Follow up in about 6 months (around 11/21/2025), or if symptoms worsen or fail to improve.

## 2025-05-21 NOTE — ASSESSMENT & PLAN NOTE
PLAN     Recommend updating PCV 23. Repeat Vaccination in 5 years.   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 Will treat his infections aggressively in the future with prolonged course of ABX.   See AVS for education provided.

## 2025-05-21 NOTE — ASSESSMENT & PLAN NOTE
PLAN     1. Continue current therapy w/ Triumeq.  Viral load returned to undetectable.  2. We will continue to monitor viral load... if still elevated, would recommend ruling out H Pylori at a later date.   3. Fu in 6 [facility].

## 2025-07-15 DIAGNOSIS — N52.9 ERECTILE DYSFUNCTION, UNSPECIFIED ERECTILE DYSFUNCTION TYPE: ICD-10-CM

## 2025-07-16 RX ORDER — SILDENAFIL 50 MG/1
50 TABLET, FILM COATED ORAL
Qty: 90 TABLET | Refills: 3 | Status: SHIPPED | OUTPATIENT
Start: 2025-07-16

## 2025-07-31 DIAGNOSIS — G47.00 INSOMNIA, UNSPECIFIED TYPE: ICD-10-CM

## 2025-07-31 RX ORDER — LORAZEPAM 0.5 MG/1
0.5 TABLET ORAL DAILY PRN
Qty: 30 TABLET | Refills: 5 | Status: SHIPPED | OUTPATIENT
Start: 2025-07-31